# Patient Record
Sex: FEMALE | Race: WHITE | NOT HISPANIC OR LATINO | Employment: FULL TIME | URBAN - METROPOLITAN AREA
[De-identification: names, ages, dates, MRNs, and addresses within clinical notes are randomized per-mention and may not be internally consistent; named-entity substitution may affect disease eponyms.]

---

## 2017-02-20 ENCOUNTER — OFFICE VISIT (OUTPATIENT)
Dept: OBSTETRICS AND GYNECOLOGY | Age: 37
End: 2017-02-20

## 2017-02-20 VITALS
DIASTOLIC BLOOD PRESSURE: 88 MMHG | HEIGHT: 67 IN | WEIGHT: 274 LBS | BODY MASS INDEX: 43 KG/M2 | SYSTOLIC BLOOD PRESSURE: 110 MMHG

## 2017-02-20 DIAGNOSIS — N83.201 CYST OF RIGHT OVARY: ICD-10-CM

## 2017-02-20 DIAGNOSIS — Z01.419 ENCOUNTER FOR GYNECOLOGICAL EXAMINATION WITHOUT ABNORMAL FINDING: Primary | ICD-10-CM

## 2017-02-20 DIAGNOSIS — Z87.42 HISTORY OF PCOS: ICD-10-CM

## 2017-02-20 PROCEDURE — 99385 PREV VISIT NEW AGE 18-39: CPT | Performed by: OBSTETRICS & GYNECOLOGY

## 2017-02-20 RX ORDER — PHENTERMINE HYDROCHLORIDE 37.5 MG/1
37.5 CAPSULE ORAL
COMMUNITY
End: 2020-01-21

## 2017-02-20 NOTE — PROGRESS NOTES
"Routine Annual Visit    2/20/2017    Patient: Lizette Flores          MR#:[id number]      Chief Complaint   Patient presents with   • Annual Exam     PT HERE AS NEW GYN, NEEDS PAP. LAST ANNUAL 3 YRS AGO, NO COMPLAINTS.        History of Present Illness    36 y.o. female No obstetric history on file. who presents for annual exam.   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 Patient is not currently having periods (Reason: Other).  Obstetric History:  OB History     No data available         Menstrual History:     No LMP recorded. Patient is not currently having periods (Reason: Other).       Sexual History:       ________________________________________  There is no problem list on file for this patient.      No past medical history on file.    No past surgical history on file.    History   Smoking Status   • Not on file   Smokeless Tobacco   • Not on file       has a current medication list which includes the following prescription(s): phentermine.  ________________________________________    Current contraception: IUD  History of abnormal Pap smear: no  Family history of Breast cancer: MGM- 80s  Family history of uterine or ovarian cancer: no  Family History of colon cancer/colon polyps: no  History of abnormal mammogram: no      The following portions of the patient's history were reviewed and updated as appropriate: allergies, current medications, past family history, past medical history, past social history, past surgical history and problem list.    Review of Systems    Pertinent items are noted in HPI.     Objective   Physical Exam    Visit Vitals   • /88   • Ht 67\" (170.2 cm)   • " "Wt 274 lb (124 kg)   • LMP Comment: MIRENA   • BMI 42.91 kg/m2      BP Readings from Last 3 Encounters:   02/20/17 110/88      Wt Readings from Last 3 Encounters:   02/20/17 274 lb (124 kg)      BMI: Estimated body mass index is 42.91 kg/(m^2) as calculated from the following:    Height as of this encounter: 67\" (170.2 cm).    Weight as of this encounter: 274 lb (124 kg).      General:   alert, appears stated age and cooperative   Abdomen: soft, non-tender, without masses or organomegaly   Breast: inspection negative, no nipple discharge or bleeding, no masses or nodularity palpable   Vulva: normal   Vagina: normal mucosa   Cervix: no cervical motion tenderness and no lesions   Uterus: normal size, mobile or non-tender   Adnexa: normal adnexa and no mass, fullness, tenderness     Assessment:    1. Normal annual exam   Assessment     ICD-10-CM ICD-9-CM   1. Encounter for gynecological examination without abnormal finding Z01.419 V72.31   2. Cyst of right ovary N83.201 620.2   3. History of PCOS Z87.42 V13.29     Plan:    Plan     []  Mammogram request made  [x]  PAP done  []  Labs:   []  GC/Chl/TV  []  DEXA scan   []  Referral for colonoscopy:     mammo age 41  IUD removal next AE  sched GYN US to eval for cyst  "

## 2017-02-24 LAB
CYTOLOGIST CVX/VAG CYTO: ABNORMAL
CYTOLOGY CVX/VAG DOC THIN PREP: ABNORMAL
DX ICD CODE: ABNORMAL
DX ICD CODE: ABNORMAL
HIV 1 & 2 AB SER-IMP: ABNORMAL
HPV I/H RISK 4 DNA CVX QL PROBE+SIG AMP: POSITIVE
HPV16 DNA CVX QL PROBE+SIG AMP: NEGATIVE
HPV18+45 E6+E7 MRNA CVX QL NAA+PROBE: NEGATIVE
OTHER STN SPEC: ABNORMAL
PATH REPORT.FINAL DX SPEC: ABNORMAL
STAT OF ADQ CVX/VAG CYTO-IMP: ABNORMAL

## 2017-02-28 ENCOUNTER — TELEPHONE (OUTPATIENT)
Dept: OBSTETRICS AND GYNECOLOGY | Age: 37
End: 2017-02-28

## 2017-02-28 NOTE — TELEPHONE ENCOUNTER
----- Message from Renee Cardenas MD sent at 2/27/2017  2:19 PM EST -----  Notify negative pap , but HPV is positive, not the highest risk types.  I would rec retesting in 6 months, sched repeat pap in 6 months

## 2017-02-28 NOTE — TELEPHONE ENCOUNTER
LMTC    Notify negative pap , but HPV is positive, not the highest risk types. I would rec retesting in 6 months, sched repeat pap in 6 months

## 2017-03-09 ENCOUNTER — PROCEDURE VISIT (OUTPATIENT)
Dept: OBSTETRICS AND GYNECOLOGY | Age: 37
End: 2017-03-09

## 2017-03-09 ENCOUNTER — OFFICE VISIT (OUTPATIENT)
Dept: OBSTETRICS AND GYNECOLOGY | Age: 37
End: 2017-03-09

## 2017-03-09 VITALS
HEIGHT: 66 IN | SYSTOLIC BLOOD PRESSURE: 114 MMHG | BODY MASS INDEX: 43.71 KG/M2 | WEIGHT: 272 LBS | DIASTOLIC BLOOD PRESSURE: 82 MMHG

## 2017-03-09 DIAGNOSIS — N83.201 CYST OF RIGHT OVARY: Primary | ICD-10-CM

## 2017-03-09 DIAGNOSIS — D25.1 INTRAMURAL LEIOMYOMA OF UTERUS: ICD-10-CM

## 2017-03-09 DIAGNOSIS — N83.201 CYST OF OVARY, RIGHT: Primary | ICD-10-CM

## 2017-03-09 PROCEDURE — 76830 TRANSVAGINAL US NON-OB: CPT | Performed by: OBSTETRICS & GYNECOLOGY

## 2017-03-09 PROCEDURE — 99213 OFFICE O/P EST LOW 20 MIN: CPT | Performed by: OBSTETRICS & GYNECOLOGY

## 2017-03-09 NOTE — PROGRESS NOTES
"GYN Visit    3/9/2017    Patient: Lizette Flores          MR#:[id number]      Chief Complaint   Patient presents with   • Imaging Only     PT HERE TO EVALUATE CYST ON RIGHT OVARY.       History of Present Illness    36 y.o. female No obstetric history on file. who presents for follow up ovarian cyst  No complaints, has Mirena IUD  Reviewed pap with pos HPV- pt has repeat pap sched in 6 months, disc HPV and cervical dysplasia         No LMP recorded. Patient is not currently having periods (Reason: Other).    ________________________________________  There is no problem list on file for this patient.      No past medical history on file.    No past surgical history on file.    History   Smoking Status   • Not on file   Smokeless Tobacco   • Not on file       has a current medication list which includes the following prescription(s): phentermine.  ________________________________________    Current contraception: IUD      The following portions of the patient's history were reviewed and updated as appropriate: allergies, current medications, past family history, past medical history, past social history, past surgical history and problem list.    Review of Systems    Pertinent items are noted in HPI.     Objective   Physical Exam    Visit Vitals   • /82   • Ht 66\" (167.6 cm)   • Wt 272 lb (123 kg)   • LMP Comment: MIRENA   • BMI 43.9 kg/m2      BP Readings from Last 3 Encounters:   03/09/17 114/82   02/20/17 110/88      Wt Readings from Last 3 Encounters:   03/09/17 272 lb (123 kg)   02/20/17 274 lb (124 kg)      BMI: Estimated body mass index is 43.9 kg/(m^2) as calculated from the following:    Height as of this encounter: 66\" (167.6 cm).    Weight as of this encounter: 272 lb (123 kg).      General:   alert, appears stated age and cooperative   Abdomen: soft, non-tender, without masses or organomegaly   Breast: not examined   Vulva: normal   Vagina: normal mucosa   Cervix: no cervical motion tenderness and " no lesions   Uterus: normal size, mobile or non-tender   Adnexa: normal adnexa and no mass, fullness, tenderness     US done in office:  See report, 2 small fibroids,ovaries with normal sized follicles, ovarian cyst resolved, non tender to probe, IUD in place    Assessment:      Lizette was seen today for imaging only.    Diagnoses and all orders for this visit:    Cyst of ovary, right    Intramural leiomyoma of uterus        Discussed diminutive fibroids, disc HPV  Ovarian cyst has resolved, overall reassuring US  Follow up 6 months for repeat pap

## 2017-08-25 ENCOUNTER — OFFICE VISIT (OUTPATIENT)
Dept: OBSTETRICS AND GYNECOLOGY | Age: 37
End: 2017-08-25

## 2017-08-25 VITALS
SYSTOLIC BLOOD PRESSURE: 128 MMHG | BODY MASS INDEX: 47.8 KG/M2 | WEIGHT: 280 LBS | DIASTOLIC BLOOD PRESSURE: 78 MMHG | HEIGHT: 64 IN

## 2017-08-25 DIAGNOSIS — R87.619 ABNORMAL CERVICAL PAPANICOLAOU SMEAR, UNSPECIFIED ABNORMAL PAP FINDING: ICD-10-CM

## 2017-08-25 DIAGNOSIS — Z12.4 SCREENING FOR CERVICAL CANCER: ICD-10-CM

## 2017-08-25 DIAGNOSIS — Z11.51 SCREENING FOR HPV (HUMAN PAPILLOMAVIRUS): Primary | ICD-10-CM

## 2017-08-25 DIAGNOSIS — N92.6 IRREGULAR MENSES: ICD-10-CM

## 2017-08-25 PROCEDURE — 99213 OFFICE O/P EST LOW 20 MIN: CPT | Performed by: OBSTETRICS & GYNECOLOGY

## 2017-08-25 RX ORDER — PEN NEEDLE, DIABETIC 31 G X1/4"
NEEDLE, DISPOSABLE MISCELLANEOUS
COMMUNITY
Start: 2017-06-05 | End: 2022-04-22

## 2017-08-25 RX ORDER — SUMATRIPTAN 100 MG/1
TABLET, FILM COATED ORAL
COMMUNITY
Start: 2017-08-18 | End: 2019-01-07

## 2017-08-25 NOTE — PROGRESS NOTES
"GYN Visit    8/25/2017    Patient: Lizette Flores          MR#:[id number]      Chief Complaint   Patient presents with   • Follow-up     PT HERE FOR 6 MO REP PAP DUE TO NEG PAP BUT +HPV. SHE IS DOING WELL.       History of Present Illness    37 y.o. female No obstetric history on file. who presents for  Follow up pap  Normal pap but hpv positive but not the 16/18/45 (2/20/17)  IUD in place- no menses except has spontaneous menses on 8/3 this month- dina heavy  Thinks IUD will need to be changed 8/18  Considering fertility soon        Patient's last menstrual period was 08/03/2017 (exact date).    ________________________________________  There is no problem list on file for this patient.      Past Medical History:   Diagnosis Date   • HPV in female        No past surgical history on file.    History   Smoking Status   • Not on file   Smokeless Tobacco   • Not on file       has a current medication list which includes the following prescription(s): pen needles, phentermine, and sumatriptan.  ________________________________________    Current contraception: IUD      The following portions of the patient's history were reviewed and updated as appropriate: allergies, current medications, past family history, past medical history, past social history, past surgical history and problem list.    Review of Systems   Constitutional: Negative for fatigue.   Gastrointestinal: Negative for nausea and vomiting.   Genitourinary: Positive for menstrual problem. Negative for dysuria, pelvic pain and vaginal discharge.   Neurological: Negative for light-headedness.   Psychiatric/Behavioral: Negative for dysphoric mood.   All other systems reviewed and are negative.           Objective   Physical Exam    /78  Ht 64\" (162.6 cm)  Wt 280 lb (127 kg)  LMP 08/03/2017 (Exact Date)  BMI 48.06 kg/m2   BP Readings from Last 3 Encounters:   08/25/17 128/78   03/09/17 114/82   02/20/17 110/88      Wt Readings from Last 3 " "Encounters:   08/25/17 280 lb (127 kg)   03/09/17 272 lb (123 kg)   02/20/17 274 lb (124 kg)      BMI: Estimated body mass index is 48.06 kg/(m^2) as calculated from the following:    Height as of this encounter: 64\" (162.6 cm).    Weight as of this encounter: 280 lb (127 kg).      General:   alert, appears stated age and cooperative   Abdomen: soft, non-tender, without masses or organomegaly   Breast: Not examined   Vulva: normal   Vagina: normal mucosa, normal discharge   Cervix: no cervical motion tenderness, no lesions and string visible, small amount bleeding with  pap   Uterus: Deferred   Adnexa: Deferred     Assessment:      Lizette was seen today for follow-up.    Diagnoses and all orders for this visit:    Screening for HPV (human papillomavirus)  -     IGP, Apt HPV,rfx 16 / 18,45    Abnormal cervical Papanicolaou smear, unspecified abnormal pap finding  -     IGP, Apt HPV,rfx 16 / 18,45    Screening for cervical cancer    Irregular menses        Will observe menses for now with IUD in place  Will order chart to see when IUD expires  AE in 6 months- will need colpo if pap is abn      "

## 2017-08-29 ENCOUNTER — TELEPHONE (OUTPATIENT)
Dept: OBSTETRICS AND GYNECOLOGY | Age: 37
End: 2017-08-29

## 2017-08-29 NOTE — TELEPHONE ENCOUNTER
----- Message from Renee Cardenas MD sent at 8/29/2017  7:09 AM EDT -----  Is there a pap with this , it is strange to have hpv first.  This should be a pap with hpv and reflex to 16/18/45- ?ordered wrong

## 2017-08-29 NOTE — TELEPHONE ENCOUNTER
THIS IS A PRELIMINARY RESULT, WAS ORDERED CORRECTLY BUT FOR SOME REASON HPV CAME BACK FIRST. WILL CHECK TOMORROW FOR COMPLETE RESULT.

## 2017-08-30 LAB
HPV I/H RISK 4 DNA CVX QL PROBE+SIG AMP: NEGATIVE
PATH REPORT.FINAL DX SPEC: NORMAL

## 2018-10-12 ENCOUNTER — OFFICE VISIT (OUTPATIENT)
Dept: OBSTETRICS AND GYNECOLOGY | Age: 38
End: 2018-10-12

## 2018-10-12 VITALS
BODY MASS INDEX: 45.83 KG/M2 | SYSTOLIC BLOOD PRESSURE: 124 MMHG | HEIGHT: 67 IN | WEIGHT: 292 LBS | DIASTOLIC BLOOD PRESSURE: 84 MMHG

## 2018-10-12 DIAGNOSIS — Z01.419 WELL WOMAN EXAM WITH ROUTINE GYNECOLOGICAL EXAM: Primary | ICD-10-CM

## 2018-10-12 DIAGNOSIS — B97.7 HPV IN FEMALE: ICD-10-CM

## 2018-10-12 DIAGNOSIS — Z30.431 IUD CHECK UP: ICD-10-CM

## 2018-10-12 PROCEDURE — 99395 PREV VISIT EST AGE 18-39: CPT | Performed by: PHYSICIAN ASSISTANT

## 2018-10-12 RX ORDER — RIBOFLAVIN (VITAMIN B2) 100 MG
100 TABLET ORAL DAILY
COMMUNITY
End: 2020-05-27

## 2018-10-12 RX ORDER — CHLORAL HYDRATE 500 MG
1000 CAPSULE ORAL
COMMUNITY

## 2018-10-12 RX ORDER — GABAPENTIN 100 MG/1
100 CAPSULE ORAL 3 TIMES DAILY
COMMUNITY
Start: 2018-05-15

## 2018-10-12 RX ORDER — PROPRANOLOL HYDROCHLORIDE 20 MG/1
5 TABLET ORAL 2 TIMES DAILY
COMMUNITY
Start: 2018-05-15 | End: 2020-05-18

## 2018-10-12 RX ORDER — MISOPROSTOL 200 UG/1
TABLET ORAL
Qty: 1 TABLET | Refills: 0 | Status: SHIPPED | OUTPATIENT
Start: 2018-10-12 | End: 2018-11-13

## 2018-10-12 NOTE — PROGRESS NOTES
"Subjective     Chief Complaint   Patient presents with   • Gynecologic Exam     annual exam. and repeat pap last pap 08/25/2017 neg/neg   • Procedure     wants iud removed and benefits checked        History of Present Illness    Lizette Flores is a 38 y.o. No obstetric history on file. who presents for annual exam.    Pt here for her annual exam  She has a h/o abnormal pap/HPV  She also has an IUD that needs to be removed but also wants to have one replaced  She is here with her   She has no new med issues  Sees her PCP routinely and will check routine labs as well    She is a pt of Dr hough    Her menses are rare, lasting 0-3 days, dysmenorrhea none   Obstetric History:  OB History     No data available         Menstrual History:     No LMP recorded. Patient has had an implant.         Current contraception: IUD  History of abnormal Pap smear: yes - hpv  Received Gardasil immunization: no  Perform regular self breast exam: yes - mthly  Family history of uterine or ovarian cancer: no  Family History of colon cancer: no  Family history of breast cancer: no    Mammogram: not indicated.  Colonoscopy: not indicated.  DEXA: not indicated.    Exercise: not active  Calcium/Vitamin D: adequate intake    The following portions of the patient's history were reviewed and updated as appropriate: allergies, current medications, past family history, past medical history, past social history, past surgical history and problem list.    Review of Systems   All other systems reviewed and are negative.      Review of Systems   Constitutional: Negative for fatigue.   Respiratory: Negative for shortness of breath.    Gastrointestinal: Negative for abdominal pain.   Genitourinary: Negative for dysuria.   Neurological: Negative for headaches.   Psychiatric/Behavioral: Negative for dysphoric mood.         Objective   Physical Exam    /84   Ht 170.2 cm (67\")   Wt 132 kg (292 lb)   Breastfeeding? No   BMI 45.73 kg/m² "     General:   alert, appears stated age and cooperative   Neck: no adenopathy and thyroid normal to palpation   Heart: regular rate and rhythm   Lungs: clear to auscultation bilaterally   Abdomen: soft and nontender   Breast: inspection negative, no nipple discharge or bleeding, no masses or nodularity palpable   Vulva: normal   Vagina: normal mucosa, normal discharge   Cervix: no lesions and iud in place   Uterus: normal size, non-tender   Adnexa: normal adnexa and no mass, fullness, tenderness   Rectal: not indicated     Assessment/Plan   Lizette was seen today for gynecologic exam and procedure.    Diagnoses and all orders for this visit:    Well woman exam with routine gynecological exam  -     Pap IG, Rfx HPV ASCU    IUD check up    HPV in female  -     Pap IG, Rfx HPV ASCU    Other orders  -     misoprostol (CYTOTEC) 200 MCG tablet; 1 po night before procedure        All questions answered.  Breast self exam technique reviewed and patient encouraged to perform self-exam monthly.  Discussed healthy lifestyle modifications.  Recommended 30 minutes of aerobic exercise five times per week.  Discussed calcium needs to prevent osteoporosis.    Disc pap guidelines, she is due given her h/o abnormal/+hpv result  Disc removal and replacement at the same time. She is overdue for removal, but not having a cycle and likely having some contraceptive benefit.  Will plan to use back up though as well  Disc Kyleena and gave HO  Plan cytotec as pt has never had a baby

## 2018-10-15 LAB
CONV .: NORMAL
CYTOLOGIST CVX/VAG CYTO: NORMAL
CYTOLOGY CVX/VAG DOC THIN PREP: NORMAL
DX ICD CODE: NORMAL
HIV 1 & 2 AB SER-IMP: NORMAL
OTHER STN SPEC: NORMAL
PATH REPORT.FINAL DX SPEC: NORMAL
STAT OF ADQ CVX/VAG CYTO-IMP: NORMAL

## 2018-11-13 ENCOUNTER — OFFICE VISIT (OUTPATIENT)
Dept: OBSTETRICS AND GYNECOLOGY | Age: 38
End: 2018-11-13

## 2018-11-13 ENCOUNTER — PROCEDURE VISIT (OUTPATIENT)
Dept: OBSTETRICS AND GYNECOLOGY | Age: 38
End: 2018-11-13

## 2018-11-13 VITALS
SYSTOLIC BLOOD PRESSURE: 128 MMHG | WEIGHT: 292 LBS | HEIGHT: 67 IN | DIASTOLIC BLOOD PRESSURE: 80 MMHG | BODY MASS INDEX: 45.83 KG/M2

## 2018-11-13 DIAGNOSIS — Z30.430 ENCOUNTER FOR INSERTION OF MIRENA IUD: Primary | ICD-10-CM

## 2018-11-13 DIAGNOSIS — Z30.432 ENCOUNTER FOR REMOVAL OF INTRAUTERINE CONTRACEPTIVE DEVICE (IUD): ICD-10-CM

## 2018-11-13 DIAGNOSIS — Z30.431 IUD CHECK UP: Primary | ICD-10-CM

## 2018-11-13 LAB
B-HCG UR QL: NEGATIVE
INTERNAL NEGATIVE CONTROL: NEGATIVE
INTERNAL POSITIVE CONTROL: POSITIVE
Lab: NORMAL

## 2018-11-13 PROCEDURE — 81025 URINE PREGNANCY TEST: CPT | Performed by: PHYSICIAN ASSISTANT

## 2018-11-13 PROCEDURE — 58301 REMOVE INTRAUTERINE DEVICE: CPT | Performed by: PHYSICIAN ASSISTANT

## 2018-11-13 PROCEDURE — 58300 INSERT INTRAUTERINE DEVICE: CPT | Performed by: PHYSICIAN ASSISTANT

## 2018-11-13 PROCEDURE — 76830 TRANSVAGINAL US NON-OB: CPT | Performed by: PHYSICIAN ASSISTANT

## 2018-11-13 NOTE — PROGRESS NOTES
IUD Removal    Date of procedure:  11/13/2018    Risks and benefits discussed? yes  All questions answered? yes  Consents given by The patient  Reason for removal: Device expiration        Procedure documentation:    A speculum was placed in order to view the cervix.  .  The IUD string was easily seen.  The string was grasped and the IUD was removed without difficulty.  The IUD did not appear to be adherent to the uterine cavity. It was removed intact.    She tolerated the procedure without any difficulty.     Post procedure instructions: Patient notified to call with heavy bleeding, fever or increasing pain.    Follow up needed: 6 week(s) for iud check        IUD Insertion    No LMP recorded. Patient has had an implant.    Date of procedure:  11/13/2018    Risks and benefits discussed? yes  All questions answered? yes  Consents given by The patient  Written consent obtained? yes    Procedure documentation:     Urine pregnancy test was done today and result was negative.  The risks (including infection,  bleeding, pain, and uterine perforation) and benefits of the procedure were  explained to the patient and Written informed consent was  obtained.    After verifying the patient had a low probability of being pregnant and met the criteria for insertion, a sterile speculum has placed and the cervix was cleansed with an antiseptic solution.  Vaginal discharge was scant.  The anterior lip of the cervix was grasped with a tenaculum and the uterine cavity was gently sounded. There was moderate difficulty passing the sound through the cervix.  Cervical dilation did need to be performed prior to placing the IUD.  The uterus was anteverted and sounded to 8 cms.  The Mirena was then prepared per the manufacturers instructions.    The Mirena was advanced to a point 2 cms from the fundus and then the arms were released from the sheath.  The device was advanced to the fundus and the device was released fully from the sheath..  The string was cut 2 cms in length.  Bleeding from the cervix was scant.    She tolerated the procedure without any difficulty.    Post procedure instructions: The patient was advised to call for any fever or for prolonged or severe pain or bleeding..    Follow up needed: 6 weeks for IUD check    U/s done to confirm placement and IUD within endometrial cavity. Fibroid noted that measures 1.67 x 1.92 cm

## 2019-01-07 PROBLEM — F32.A ANXIETY AND DEPRESSION: Status: ACTIVE | Noted: 2019-01-07

## 2019-01-07 PROBLEM — R53.82 CHRONIC FATIGUE: Status: ACTIVE | Noted: 2019-01-07

## 2019-01-07 PROBLEM — F41.9 ANXIETY AND DEPRESSION: Status: ACTIVE | Noted: 2019-01-07

## 2019-01-07 PROBLEM — M25.559 HIP PAIN: Status: ACTIVE | Noted: 2019-01-07

## 2019-01-07 PROBLEM — R12 HEARTBURN: Status: ACTIVE | Noted: 2019-01-07

## 2019-01-07 PROBLEM — N20.0 KIDNEY STONES: Status: ACTIVE | Noted: 2019-01-07

## 2019-01-09 DIAGNOSIS — R53.82 CHRONIC FATIGUE: ICD-10-CM

## 2019-01-09 DIAGNOSIS — R12 HEARTBURN: ICD-10-CM

## 2019-01-09 DIAGNOSIS — F41.9 ANXIETY AND DEPRESSION: ICD-10-CM

## 2019-01-09 DIAGNOSIS — M25.559 ARTHRALGIA OF HIP, UNSPECIFIED LATERALITY: ICD-10-CM

## 2019-01-09 DIAGNOSIS — E66.01 OBESITY, CLASS III, BMI 40-49.9 (MORBID OBESITY) (HCC): Primary | ICD-10-CM

## 2019-01-09 DIAGNOSIS — F32.A ANXIETY AND DEPRESSION: ICD-10-CM

## 2019-01-09 PROBLEM — E66.813 OBESITY, CLASS III, BMI 40-49.9 (MORBID OBESITY): Status: ACTIVE | Noted: 2019-01-09

## 2019-01-09 PROBLEM — B97.7 HPV IN FEMALE: Status: RESOLVED | Noted: 2018-10-12 | Resolved: 2019-01-09

## 2019-01-10 DIAGNOSIS — E66.01 OBESITY, CLASS III, BMI 40-49.9 (MORBID OBESITY) (HCC): Primary | ICD-10-CM

## 2019-01-11 ENCOUNTER — APPOINTMENT (OUTPATIENT)
Dept: LAB | Facility: HOSPITAL | Age: 39
End: 2019-01-11

## 2020-01-09 PROBLEM — I10 ESSENTIAL HYPERTENSION: Status: ACTIVE | Noted: 2020-01-09

## 2020-01-21 ENCOUNTER — HOSPITAL ENCOUNTER (OUTPATIENT)
Dept: GENERAL RADIOLOGY | Facility: HOSPITAL | Age: 40
Discharge: HOME | End: 2020-01-21

## 2020-01-21 ENCOUNTER — LAB (OUTPATIENT)
Dept: LAB | Facility: HOSPITAL | Age: 40
End: 2020-01-21

## 2020-01-21 ENCOUNTER — HOSPITAL ENCOUNTER (OUTPATIENT)
Dept: CARDIOLOGY | Facility: HOSPITAL | Age: 40
Discharge: HOME | End: 2020-01-21
Admitting: NURSE PRACTITIONER

## 2020-01-21 ENCOUNTER — CONSULT (OUTPATIENT)
Dept: BARIATRICS/WEIGHT MGMT | Facility: CLINIC | Age: 40
End: 2020-01-21

## 2020-01-21 VITALS
DIASTOLIC BLOOD PRESSURE: 97 MMHG | HEIGHT: 66 IN | BODY MASS INDEX: 47.09 KG/M2 | WEIGHT: 293 LBS | RESPIRATION RATE: 18 BRPM | SYSTOLIC BLOOD PRESSURE: 149 MMHG | HEART RATE: 71 BPM | TEMPERATURE: 97.2 F

## 2020-01-21 DIAGNOSIS — F41.9 ANXIETY AND DEPRESSION: ICD-10-CM

## 2020-01-21 DIAGNOSIS — Z01.818 PRE-OP EVALUATION: ICD-10-CM

## 2020-01-21 DIAGNOSIS — E66.01 MORBID OBESITY WITH BMI OF 50.0-59.9, ADULT (HCC): ICD-10-CM

## 2020-01-21 DIAGNOSIS — E78.1 HYPERTRIGLYCERIDEMIA: ICD-10-CM

## 2020-01-21 DIAGNOSIS — R53.82 CHRONIC FATIGUE: ICD-10-CM

## 2020-01-21 DIAGNOSIS — F32.A ANXIETY AND DEPRESSION: ICD-10-CM

## 2020-01-21 DIAGNOSIS — K21.9 GASTROESOPHAGEAL REFLUX DISEASE, ESOPHAGITIS PRESENCE NOT SPECIFIED: ICD-10-CM

## 2020-01-21 DIAGNOSIS — R06.83 SNORING: ICD-10-CM

## 2020-01-21 DIAGNOSIS — E66.01 OBESITY, CLASS III, BMI 40-49.9 (MORBID OBESITY) (HCC): ICD-10-CM

## 2020-01-21 DIAGNOSIS — E66.01 MORBID OBESITY WITH BMI OF 50.0-59.9, ADULT (HCC): Primary | ICD-10-CM

## 2020-01-21 DIAGNOSIS — M25.559 ARTHRALGIA OF HIP, UNSPECIFIED LATERALITY: ICD-10-CM

## 2020-01-21 DIAGNOSIS — I10 ESSENTIAL HYPERTENSION: ICD-10-CM

## 2020-01-21 DIAGNOSIS — R12 HEARTBURN: ICD-10-CM

## 2020-01-21 PROBLEM — E28.2 PCOS (POLYCYSTIC OVARIAN SYNDROME): Status: ACTIVE | Noted: 2017-03-29

## 2020-01-21 PROBLEM — N20.0 KIDNEY STONES: Status: RESOLVED | Noted: 2019-01-07 | Resolved: 2020-01-21

## 2020-01-21 PROBLEM — G43.909 MIGRAINE: Status: ACTIVE | Noted: 2020-01-21

## 2020-01-21 PROBLEM — E66.813 OBESITY, CLASS III, BMI 40-49.9 (MORBID OBESITY): Status: RESOLVED | Noted: 2019-01-09 | Resolved: 2020-01-21

## 2020-01-21 PROBLEM — Z71.3 DIETARY COUNSELING: Status: ACTIVE | Noted: 2020-01-21

## 2020-01-21 PROBLEM — E55.9 VITAMIN D DEFICIENCY: Status: ACTIVE | Noted: 2017-03-29

## 2020-01-21 LAB
ALBUMIN SERPL-MCNC: 4.1 G/DL (ref 3.5–5.2)
ALBUMIN/GLOB SERPL: 1.3 G/DL
ALP SERPL-CCNC: 74 U/L (ref 39–117)
ALT SERPL W P-5'-P-CCNC: 17 U/L (ref 1–33)
ANION GAP SERPL CALCULATED.3IONS-SCNC: 12.8 MMOL/L (ref 5–15)
AST SERPL-CCNC: 15 U/L (ref 1–32)
BASOPHILS # BLD AUTO: 0.06 10*3/MM3 (ref 0–0.2)
BASOPHILS NFR BLD AUTO: 0.6 % (ref 0–1.5)
BILIRUB SERPL-MCNC: 0.3 MG/DL (ref 0.2–1.2)
BUN BLD-MCNC: 16 MG/DL (ref 6–20)
BUN/CREAT SERPL: 17.4 (ref 7–25)
CALCIUM SPEC-SCNC: 9.3 MG/DL (ref 8.6–10.5)
CHLORIDE SERPL-SCNC: 100 MMOL/L (ref 98–107)
CHOLEST SERPL-MCNC: 222 MG/DL (ref 0–200)
CO2 SERPL-SCNC: 26.2 MMOL/L (ref 22–29)
CREAT BLD-MCNC: 0.92 MG/DL (ref 0.57–1)
DEPRECATED RDW RBC AUTO: 41 FL (ref 37–54)
EOSINOPHIL # BLD AUTO: 0.26 10*3/MM3 (ref 0–0.4)
EOSINOPHIL NFR BLD AUTO: 2.6 % (ref 0.3–6.2)
ERYTHROCYTE [DISTWIDTH] IN BLOOD BY AUTOMATED COUNT: 12.3 % (ref 12.3–15.4)
GFR SERPL CREATININE-BSD FRML MDRD: 68 ML/MIN/1.73
GLOBULIN UR ELPH-MCNC: 3.1 GM/DL
GLUCOSE BLD-MCNC: 99 MG/DL (ref 65–99)
HBA1C MFR BLD: 5.3 % (ref 4.8–5.6)
HCT VFR BLD AUTO: 41.3 % (ref 34–46.6)
HDLC SERPL-MCNC: 50 MG/DL (ref 40–60)
HGB BLD-MCNC: 14 G/DL (ref 12–15.9)
IMM GRANULOCYTES # BLD AUTO: 0.11 10*3/MM3 (ref 0–0.05)
IMM GRANULOCYTES NFR BLD AUTO: 1.1 % (ref 0–0.5)
LDLC SERPL CALC-MCNC: 124 MG/DL (ref 0–100)
LDLC/HDLC SERPL: 2.49 {RATIO}
LYMPHOCYTES # BLD AUTO: 2.41 10*3/MM3 (ref 0.7–3.1)
LYMPHOCYTES NFR BLD AUTO: 23.9 % (ref 19.6–45.3)
MCH RBC QN AUTO: 31.1 PG (ref 26.6–33)
MCHC RBC AUTO-ENTMCNC: 33.9 G/DL (ref 31.5–35.7)
MCV RBC AUTO: 91.8 FL (ref 79–97)
MONOCYTES # BLD AUTO: 0.62 10*3/MM3 (ref 0.1–0.9)
MONOCYTES NFR BLD AUTO: 6.2 % (ref 5–12)
NEUTROPHILS # BLD AUTO: 6.61 10*3/MM3 (ref 1.7–7)
NEUTROPHILS NFR BLD AUTO: 65.6 % (ref 42.7–76)
NRBC BLD AUTO-RTO: 0.1 /100 WBC (ref 0–0.2)
PLATELET # BLD AUTO: 276 10*3/MM3 (ref 140–450)
PMV BLD AUTO: 10.1 FL (ref 6–12)
POTASSIUM BLD-SCNC: 4.1 MMOL/L (ref 3.5–5.2)
PROT SERPL-MCNC: 7.2 G/DL (ref 6–8.5)
RBC # BLD AUTO: 4.5 10*6/MM3 (ref 3.77–5.28)
SODIUM BLD-SCNC: 139 MMOL/L (ref 136–145)
TRIGL SERPL-MCNC: 238 MG/DL (ref 0–150)
TSH SERPL DL<=0.05 MIU/L-ACNC: 3.35 UIU/ML (ref 0.27–4.2)
VLDLC SERPL-MCNC: 47.6 MG/DL (ref 5–40)
WBC NRBC COR # BLD: 10.07 10*3/MM3 (ref 3.4–10.8)

## 2020-01-21 PROCEDURE — 93010 ELECTROCARDIOGRAM REPORT: CPT | Performed by: INTERNAL MEDICINE

## 2020-01-21 PROCEDURE — 85025 COMPLETE CBC W/AUTO DIFF WBC: CPT

## 2020-01-21 PROCEDURE — 80053 COMPREHEN METABOLIC PANEL: CPT

## 2020-01-21 PROCEDURE — 36415 COLL VENOUS BLD VENIPUNCTURE: CPT

## 2020-01-21 PROCEDURE — 71046 X-RAY EXAM CHEST 2 VIEWS: CPT

## 2020-01-21 PROCEDURE — 99205 OFFICE O/P NEW HI 60 MIN: CPT | Performed by: NURSE PRACTITIONER

## 2020-01-21 PROCEDURE — 93005 ELECTROCARDIOGRAM TRACING: CPT | Performed by: NURSE PRACTITIONER

## 2020-01-21 PROCEDURE — 80061 LIPID PANEL: CPT

## 2020-01-21 PROCEDURE — 83036 HEMOGLOBIN GLYCOSYLATED A1C: CPT

## 2020-01-21 PROCEDURE — 84443 ASSAY THYROID STIM HORMONE: CPT

## 2020-01-21 RX ORDER — HYDROXYZINE HYDROCHLORIDE 25 MG/1
25 TABLET, FILM COATED ORAL AS NEEDED
COMMUNITY
Start: 2019-12-18

## 2020-01-21 RX ORDER — LANSOPRAZOLE 30 MG/1
30 CAPSULE, DELAYED RELEASE ORAL DAILY
COMMUNITY
End: 2020-08-07 | Stop reason: ALTCHOICE

## 2020-01-21 RX ORDER — VITAMIN B COMPLEX
1 CAPSULE ORAL DAILY
COMMUNITY
End: 2020-05-27

## 2020-01-21 NOTE — PROGRESS NOTES
[facility] BARIATRIC SURGERY  [address] [address]  [phone]  [address] [id number] [phone]  Dept: [phone]  1/21/2020      Lizette CEDEÑO.  [id number]  [id number]  1980  female      Chief Complaint of weight gain; unable to maintain weight loss    History of Present Illness:   Lizette is a 39 y.o. female who presents today for evaluation, education and consultation regarding weight loss surgery. The patient is interested in the sleeve gastrectomy.      Diet History:Lizette has been overweight for at least 20 years, has been 35 pounds or more overweight for at least 20 years, has been 100 pounds or more overweight for 5 or more years and started dieting at age 17.  The most weight Lizette lost was 80 pounds on medication and maintained the weight loss for 1 year. Lizette describes her eating habits as volume, snacker and sweets eater. Lizette CEDEÑO has tried Physician monitored, reduced calorie, exercising and medications among others with success of losing up to 80 pounds, but in each instance regained the weight.      See dietician documentation for complete history.    Bariatric Surgery Evaluation: The patient is being seen for an initial visit for bariatric surgery evaluation.     Bariatric Co-morbidities:  sleep disturbances with possible sleep apnea, hypertension, dyslipidemia, GERD, polycystic ovarian disease and depression    Patient Active Problem List   Diagnosis   • Chronic fatigue   • GERD (gastroesophageal reflux disease)   • Anxiety and depression   • Essential hypertension   • Vitamin D deficiency   • PCOS (polycystic ovarian syndrome)   • Hypertriglyceridemia   • Morbid obesity with BMI of 50.0-59.9, adult (CMS/[facility])   • Migraine   • Dietary counseling   • Pre-op evaluation   • Snoring       Past Medical History:   Diagnosis Date   • Hip pain 1/7/2019   • HPV in female    • Kidney stone     • Kidney stones 1/7/2019   • Migraine    • PCOS (polycystic ovarian syndrome)        Past Surgical History:   Procedure Laterality Date   • ENDOSCOPY  2010   • LAPAROSCOPIC CHOLECYSTECTOMY  2010   • MANDIBLE SURGERY     • ORIF ANKLE FRACTURE         No Known Allergies      Current Outpatient Medications:   •  ascorbic acid (VITAMIN C) 100 MG tablet, Take 100 mg by mouth., Disp: , Rfl:   •  B Complex Vitamins (VITAMIN B COMPLEX) capsule capsule, Take  by mouth Daily., Disp: , Rfl:   •  cholecalciferol (VITAMIN D3) 1000 units tablet, Take 1,000 Units by mouth Daily., Disp: , Rfl:   •  Erenumab-aooe 70 MG/ML solution auto-injector, Inject 140 mg under the skin into the appropriate area as directed., Disp: , Rfl:   •  FLUoxetine HCl (PROZAC PO), Take 60 mg by mouth Daily., Disp: , Rfl:   •  gabapentin (NEURONTIN) 100 MG capsule, Take 100-300 mg by mouth., Disp: , Rfl:   •  hydrOXYzine (ATARAX) 25 MG tablet, Take 25 mg by mouth., Disp: , Rfl:   •  Insulin Pen Needle (PEN NEEDLES) 31G X 6 MM misc, , Disp: , Rfl:   •  lansoprazole (PREVACID) 30 MG capsule, Take 30 mg by mouth Daily., Disp: , Rfl:   •  levonorgestrel (MIRENA, 52 MG,) 20 MCG/24HR IUD, 1 each by Intrauterine route 1 (One) Time., Disp: , Rfl:   •  lisinopril (PRINIVIL,ZESTRIL) 10 MG tablet, Take 10 mg by mouth Daily., Disp: , Rfl:   •  Magnesium 100 MG capsule, Take  by mouth., Disp: , Rfl:   •  Multiple Vitamin (MULTI VITAMIN DAILY PO), Take  by mouth., Disp: , Rfl:   •  Omega-3 Fatty Acids (FISH OIL) 1000 MG capsule capsule, Take  by mouth., Disp: , Rfl:   •  promethazine (PHENERGAN) 12.5 MG suppository, Insert 12.5 mg into the rectum Every 6 (Six) Hours As Needed for Nausea or Vomiting., Disp: , Rfl:   •  propranolol (INDERAL) 20 MG tablet, Take 5 mg by mouth., Disp: , Rfl:   •  SUMAtriptan (IMITREX) 100 MG tablet, Take 100 mg by mouth Every 2 (Two) Hours As Needed for Migraine. Take one tablet at onset of headache. May repeat dose one time in 2 hours if  headache not relieved., Disp: , Rfl:     Social History     Socioeconomic History   • Marital status:      Spouse name: Not on file   • Number of children: 3   • Years of education: Not on file   • Highest education level: Not on file   Tobacco Use   • Smoking status: Never Smoker   • Smokeless tobacco: Never Used   Substance and Sexual Activity   • Alcohol use: Not Currently     Frequency: Never     Comment: OCCASSIONALLY   • Drug use: No   • Sexual activity: Defer     Birth control/protection: IUD       Family History   Problem Relation Age of Onset   • Breast cancer Maternal Grandmother    • Hypertension Maternal Grandmother    • Cancer Maternal Grandmother         BREAST & BONE   • Obesity Mother    • Diabetes Mother    • Hypertension Mother    • Heart attack Mother    • Heart disease Mother    • Sleep apnea Mother    • Hypertension Father    • Heart disease Brother    • Hypertension Maternal Grandfather    • Heart disease Maternal Grandfather    • Cancer Maternal Grandfather    • Hypertension Paternal Grandmother    • Multiple sclerosis Paternal Grandmother    • Hypertension Paternal Grandfather    • Cancer Paternal Grandfather         LUNG         Review of Systems:  Review of Systems   All other systems reviewed and are negative.      Physical Exam:  Vital Signs:  Weight: (!) 144 kg (317 lb)   Body mass index is 50.64 kg/m².  Temp: 97.2 °F (36.2 °C)   Heart Rate: 71   BP: 149/97     Physical Exam   Constitutional: She is oriented to person, place, and time. She appears well-developed and well-nourished.   HENT:   Head: Normocephalic and atraumatic.   Eyes: EOM are normal.   Cardiovascular: Normal rate, regular rhythm and normal heart sounds.   Pulmonary/Chest: Effort normal and breath sounds normal.   Abdominal: Soft. Bowel sounds are normal. She exhibits no distension. There is no tenderness.   Musculoskeletal: Normal range of motion.   Neurological: She is alert and oriented to person, place, and  time.   Skin: Skin is warm and dry.   Psychiatric: She has a normal mood and affect. Her behavior is normal. Judgment and thought content normal.   Vitals reviewed.         Assessment:         Lizette CEDEÑO is a 39 y.o. year old female with medically complicated severe obesity. Weight: (!) 144 kg (317 lb), Body mass index is 50.64 kg/m². and weight related problems including sleep disturbances with possible sleep apnea, hypertension, dyslipidemia, GERD, polycystic ovarian disease and depression.    I explained in detail the procedures that we are performing.  All of those procedures can be performed laparoscopically but there is a chance to convert to open if any technical challenges or complications do occur.  Bariatric surgery is not cosmetic surgery but rather a tool to help a patient make a life-long commitment lifestyle changes including diet, exercise, behavior changes, and taking supplemental vitamins and minerals.    Due to the patient's BMI and co-morbidities they are at a high risk for surgery and will obtain the following:  The patient has been advised that a letter of medical support and a history and physical must be obtained from her primary care physician. A psychological evaluation will be arranged for this patient. CBC, CMP, FLP, TSH and HgbA1C will be drawn- reviewed in the office with the patient. Lizette CEDEÑO will obtain a pre-operative CXR and EKG.     Lizette CEDEÑO was screened for sleep apnea in our office today and based on their results is at risk; home sleep study ordered    Lizette CEDEÑO will be set up for a pre-operative diagnostic esophagogastroduodenoscopy with biopsy for evaluation. The risks and benefits of the procedure were discussed with the patient in detail and all questions were answered.  Possibility of perforation, bleeding, aspiration, anoxic brain injury, respiratory and/or cardiac arrest and death were discussed.   She received handouts regarding, all questions  were answered.     The risks, benefits, alternatives, and potential complications of all of the procedures were explained in detail including, but not limited to death, anesthesia and medication adverse effect/DVT, pulmonary embolism, trocar site/incisional hernia, wound infection, abdominal infection, bleeding, failure to lose weight or gain weight and change in body image, metabolic complications with calcium, thiamine, vitamin B12, folate, iron, and anemia.    The patient was advised to start a high protein, low fat and low carbohydrate diet. The patient was given individualized information by our dietician along with general group information and handouts.     The patient was given information regarding the MARIANNA educational video. MARIANNA is an internet based educational video which explains the surgical procedure and answers basic questions regarding the procedure. The patient was provided with instructions and a password to watch the video.    The patient was encouraged to start routine exercise including but not limited to 150 minutes per week. The patient received a resistance band along with a handout of exercises.     The consultation plan was reviewed with the patient.    The patient understands the surgical procedures and the different surgical options that are available.  She understands the lifestyle changes that would be required after surgery and has agreed to participate in a pre-operative and postoperative weight management program.  She also expressed understanding of possible risks, had several questions answered and desires to proceed.    I think she is a good candidate for this surgery, and is interested in a sleeve gastrectomy.    Encounter Diagnoses   Name Primary?   • Morbid obesity with BMI of 50.0-59.9, adult (CMS/[facility]) Yes   • Essential hypertension    • Hypertriglyceridemia    • Gastroesophageal reflux disease, esophagitis presence not specified    • Pre-op evaluation    • Snoring         Plan:    Patient will have evaluations and follow up with bariatric dieticians and a psychologist before undergoing a multidisciplinary review of her candidacy.  We also discussed the weight loss requirement and rationale, and other program requirements.      Alpa Cardenas, APRN  1/21/2020

## 2020-01-21 NOTE — PROGRESS NOTES
"Bariatric Nutrition Counseling Interview    Patient Name:  Lizette CEDEÑO  YOB: 1980  Age:  39 y.o.  Sex:  female  MRN: [id number]  Date:  01/21/20    Procedure Considering:  Sleeve    Last Documented Height:    Ht Readings from Last 1 Encounters:   01/21/20 168.5 cm (66.34\")     Last Documented Weight:   Wt Readings from Last 1 Encounters:   01/21/20 (!) 144 kg (317 lb)      Body mass index is 50.64 kg/m².    Highest Weight:  323  Goal Weight: 180    History:  Past Medical History:   Diagnosis Date   • Hip pain 1/7/2019   • HPV in female    • Kidney stone    • Kidney stones 1/7/2019   • Migraine    • PCOS (polycystic ovarian syndrome)      Past Surgical History:   Procedure Laterality Date   • ENDOSCOPY  2010   • LAPAROSCOPIC CHOLECYSTECTOMY  2010   • MANDIBLE SURGERY     • ORIF ANKLE FRACTURE       Family History   Problem Relation Age of Onset   • Breast cancer Maternal Grandmother    • Hypertension Maternal Grandmother    • Cancer Maternal Grandmother         BREAST & BONE   • Obesity Mother    • Diabetes Mother    • Hypertension Mother    • Heart attack Mother    • Heart disease Mother    • Sleep apnea Mother    • Hypertension Father    • Heart disease Brother    • Hypertension Maternal Grandfather    • Heart disease Maternal Grandfather    • Cancer Maternal Grandfather    • Hypertension Paternal Grandmother    • Multiple sclerosis Paternal Grandmother    • Hypertension Paternal Grandfather    • Cancer Paternal Grandfather         LUNG     Social History     Socioeconomic History   • Marital status:      Spouse name: Not on file   • Number of children: 3   • Years of education: Not on file   • Highest education level: Not on file   Tobacco Use   • Smoking status: Never Smoker   • Smokeless tobacco: Never Used   Substance and Sexual Activity   • Alcohol use: Not Currently     Frequency: Never     Comment: OCCASSIONALLY   • Drug use: No   • Sexual activity: Defer     Birth " control/protection: IUD     Additional Health Issues to Consider:  Depression, migraines, HTN per patient report    Weight History:  Always been overweight    Previous Weight Loss Efforts:  Calorie counting, lost 80lbs, regained  Most Successful Weight Loss Effort:  Calorie counting, Phentermine, low carb    Eating Habits: Eat large portions, Eat too fast, Particular food craving  Eat three meals on most days?  Yes  Worst eating habit?  Eat large portions, Eat too fast, Particular food craving    How often do you eat fast food? weekly    Do you exercise regularly? 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 The significance of taking in at least 70g of protein and 64 ounces of fluid was emphasized. The importance of routine exercise was discussed. Nutrition materials provided included a reduced calorie meal plan, protein sources, snack options, and diet guidelines post-surgery. Discussed personal habits and lifestyle behaviors that may influence diet efforts. She demonstrated a good comprehension of diet requirements and a commitment to work on personal challenges.  Patient was also provided a list of short-term goals to work towards prior to surgery. She appears to be an appropriate candidate for bariatric surgery.    Electronically signed by:  Sarahy Cazares RD  01/21/20 2:20 PM

## 2020-01-23 ENCOUNTER — TELEPHONE (OUTPATIENT)
Dept: BARIATRICS/WEIGHT MGMT | Facility: CLINIC | Age: 40
End: 2020-01-23

## 2020-01-23 PROBLEM — K21.9 GASTROESOPHAGEAL REFLUX DISEASE: Status: ACTIVE | Noted: 2020-01-23

## 2020-01-23 NOTE — TELEPHONE ENCOUNTER
LVM for pt to call office regarding normal chest xray. Instructed pt to call office with any questions.        ----- Message from ARIANA Kumar sent at 1/22/2020  1:36 PM EST -----  marisa

## 2020-01-30 ENCOUNTER — TELEPHONE (OUTPATIENT)
Dept: BARIATRICS/WEIGHT MGMT | Facility: CLINIC | Age: 40
End: 2020-01-30

## 2020-01-30 NOTE — TELEPHONE ENCOUNTER
LM for patient regarding normal chest xray. instruted patient to call back with any questions or concerns.

## 2020-01-31 ENCOUNTER — TELEPHONE (OUTPATIENT)
Dept: BARIATRICS/WEIGHT MGMT | Facility: CLINIC | Age: 40
End: 2020-01-31

## 2020-01-31 NOTE — TELEPHONE ENCOUNTER
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 I also informed her that I will reach back out to her by Tuesday when Areli is in the office.

## 2020-01-31 NOTE — TELEPHONE ENCOUNTER
I forwarded this to Areli since she met this patient and knows her history and has done a physical exam.  However.  Without knowing her and her history you can let her know that generally a prolonged QT interval means that is taking longer for the heart to recharge between beats than it normally does.  And she has a prolonged NM interval which means that there is a delay between conduction between the atria and the ventricles which is sometimes called first-degree heart block.  Most of the time first-degree heart block is benign.  There are several medications that may contribute to a prolonged QT interval at these primarily consist of antipsychotic meds, certain antidepressants, and certain antibiotics none of which I see on her medication list.  Likely we will send her for cardiac clearance but as I have not seen her I will refer to Nuria when she is back on Tuesday.

## 2020-02-05 ENCOUNTER — TELEPHONE (OUTPATIENT)
Dept: BARIATRICS/WEIGHT MGMT | Facility: CLINIC | Age: 40
End: 2020-02-05

## 2020-02-05 NOTE — TELEPHONE ENCOUNTER
Spoke to patient and let her know about EKG results.     ----- Message from ARIANA Kumar sent at 2/5/2020  9:08 AM EST -----  Per Dr. Betancourt this is ok

## 2020-02-11 ENCOUNTER — HOSPITAL ENCOUNTER (OUTPATIENT)
Dept: SLEEP MEDICINE | Facility: HOSPITAL | Age: 40
Discharge: HOME | End: 2020-02-11
Admitting: NURSE PRACTITIONER

## 2020-02-11 DIAGNOSIS — I10 ESSENTIAL HYPERTENSION: ICD-10-CM

## 2020-02-11 DIAGNOSIS — E66.01 MORBID OBESITY WITH BMI OF 50.0-59.9, ADULT (HCC): ICD-10-CM

## 2020-02-11 DIAGNOSIS — R06.83 SNORING: ICD-10-CM

## 2020-02-11 DIAGNOSIS — Z01.818 PRE-OP EVALUATION: ICD-10-CM

## 2020-02-11 PROCEDURE — 95806 SLEEP STUDY UNATT&RESP EFFT: CPT

## 2020-02-11 PROCEDURE — 95806 SLEEP STUDY UNATT&RESP EFFT: CPT | Performed by: INTERNAL MEDICINE

## 2020-02-14 ENCOUNTER — TELEPHONE (OUTPATIENT)
Dept: SLEEP MEDICINE | Facility: HOSPITAL | Age: 40
End: 2020-02-14

## 2020-02-14 NOTE — TELEPHONE ENCOUNTER
Called pt with sleep study results and faxed orders to [facility] on 2/14/2020.  F/u appt scheduled on 4/16/2020 @ 1:30 pm with Dr. Chong.  CV

## 2020-02-21 DIAGNOSIS — E66.01 MORBID OBESITY WITH BMI OF 50.0-59.9, ADULT (HCC): ICD-10-CM

## 2020-02-21 DIAGNOSIS — R06.83 SNORING: ICD-10-CM

## 2020-02-21 DIAGNOSIS — G47.33 OSA (OBSTRUCTIVE SLEEP APNEA): Primary | ICD-10-CM

## 2020-03-03 ENCOUNTER — ANESTHESIA EVENT (OUTPATIENT)
Dept: GASTROENTEROLOGY | Facility: HOSPITAL | Age: 40
End: 2020-03-03

## 2020-03-03 ENCOUNTER — ANESTHESIA (OUTPATIENT)
Dept: GASTROENTEROLOGY | Facility: HOSPITAL | Age: 40
End: 2020-03-03

## 2020-03-03 ENCOUNTER — HOSPITAL ENCOUNTER (OUTPATIENT)
Facility: HOSPITAL | Age: 40
Setting detail: HOSPITAL OUTPATIENT SURGERY
Discharge: HOME | End: 2020-03-03
Attending: SURGERY | Admitting: SURGERY

## 2020-03-03 VITALS
WEIGHT: 293 LBS | SYSTOLIC BLOOD PRESSURE: 111 MMHG | HEART RATE: 71 BPM | TEMPERATURE: 97.6 F | OXYGEN SATURATION: 100 % | DIASTOLIC BLOOD PRESSURE: 75 MMHG | RESPIRATION RATE: 16 BRPM | HEIGHT: 66 IN | BODY MASS INDEX: 47.09 KG/M2

## 2020-03-03 DIAGNOSIS — E66.01 MORBID OBESITY WITH BMI OF 50.0-59.9, ADULT (HCC): ICD-10-CM

## 2020-03-03 DIAGNOSIS — Z01.818 PRE-OP EVALUATION: ICD-10-CM

## 2020-03-03 DIAGNOSIS — K21.9 GASTROESOPHAGEAL REFLUX DISEASE, ESOPHAGITIS PRESENCE NOT SPECIFIED: ICD-10-CM

## 2020-03-03 PROCEDURE — 87081 CULTURE SCREEN ONLY: CPT | Performed by: SURGERY

## 2020-03-03 PROCEDURE — 25010000002 PROPOFOL 10 MG/ML EMULSION: Performed by: ANESTHESIOLOGY

## 2020-03-03 PROCEDURE — 43239 EGD BIOPSY SINGLE/MULTIPLE: CPT | Performed by: SURGERY

## 2020-03-03 PROCEDURE — 81025 URINE PREGNANCY TEST: CPT | Performed by: SURGERY

## 2020-03-03 RX ORDER — PROPOFOL 10 MG/ML
VIAL (ML) INTRAVENOUS CONTINUOUS PRN
Status: DISCONTINUED | OUTPATIENT
Start: 2020-03-03 | End: 2020-03-03 | Stop reason: SURG

## 2020-03-03 RX ORDER — PROPOFOL 10 MG/ML
VIAL (ML) INTRAVENOUS AS NEEDED
Status: DISCONTINUED | OUTPATIENT
Start: 2020-03-03 | End: 2020-03-03 | Stop reason: SURG

## 2020-03-03 RX ORDER — SODIUM CHLORIDE, SODIUM LACTATE, POTASSIUM CHLORIDE, CALCIUM CHLORIDE 600; 310; 30; 20 MG/100ML; MG/100ML; MG/100ML; MG/100ML
1000 INJECTION, SOLUTION INTRAVENOUS CONTINUOUS
Status: DISCONTINUED | OUTPATIENT
Start: 2020-03-03 | End: 2020-03-03 | Stop reason: HOSPADM

## 2020-03-03 RX ADMIN — PROPOFOL 125 MCG/KG/MIN: 10 INJECTION, EMULSION INTRAVENOUS at 07:13

## 2020-03-03 RX ADMIN — PROPOFOL 75 MG: 10 INJECTION, EMULSION INTRAVENOUS at 07:13

## 2020-03-03 RX ADMIN — SODIUM CHLORIDE, POTASSIUM CHLORIDE, SODIUM LACTATE AND CALCIUM CHLORIDE 1000 ML: 600; 310; 30; 20 INJECTION, SOLUTION INTRAVENOUS at 06:42

## 2020-03-03 NOTE — H&P
Patient Care Team:  Trena Ferro APRN as PCP - General (Nurse Practitioner)    Chief complaint Heartburn and in need of preoperative clearance prior to surgery    Subjective     Patient is a 39 y.o. female who is a patient of ours and has undergone our extensive initial evaluation for bariatric surgery and needs to proceed with upper endoscopy for preoperative clearance prior to proceeding with surgery.  Please see the initial history and physical for further detailed information.      Review of Systems   Pertinent items are noted in HPI and no changes since last visit.    Past Medical History:   Diagnosis Date   • Hip pain 1/7/2019   • HPV in female    • Kidney stone    • Kidney stones 1/7/2019   • Migraine    • PCOS (polycystic ovarian syndrome)      Past Surgical History:   Procedure Laterality Date   • ENDOSCOPY  2010   • LAPAROSCOPIC CHOLECYSTECTOMY  2010   • MANDIBLE SURGERY     • ORIF ANKLE FRACTURE       Family History   Problem Relation Age of Onset   • Breast cancer Maternal Grandmother    • Hypertension Maternal Grandmother    • Cancer Maternal Grandmother         BREAST & BONE   • Obesity Mother    • Diabetes Mother    • Hypertension Mother    • Heart attack Mother    • Heart disease Mother    • Sleep apnea Mother    • Hypertension Father    • Heart disease Brother    • Hypertension Maternal Grandfather    • Heart disease Maternal Grandfather    • Cancer Maternal Grandfather    • Hypertension Paternal Grandmother    • Multiple sclerosis Paternal Grandmother    • Hypertension Paternal Grandfather    • Cancer Paternal Grandfather         LUNG     Social History     Tobacco Use   • Smoking status: Never Smoker   • Smokeless tobacco: Never Used   Substance Use Topics   • Alcohol use: Not Currently     Frequency: Never     Comment: OCCASSIONALLY   • Drug use: No     Medications Prior to Admission   Medication Sig Dispense Refill Last Dose   • ascorbic acid (VITAMIN C) 100 MG tablet Take 100 mg by  mouth.   3/2/2020 at Unknown time   • B Complex Vitamins (VITAMIN B COMPLEX) capsule capsule Take  by mouth Daily.   3/2/2020 at Unknown time   • cholecalciferol (VITAMIN D3) 1000 units tablet Take 1,000 Units by mouth Daily.   3/2/2020 at Unknown time   • FLUoxetine HCl (PROZAC PO) Take 60 mg by mouth Daily.   3/2/2020 at Unknown time   • gabapentin (NEURONTIN) 100 MG capsule Take 100-300 mg by mouth.   3/2/2020 at Unknown time   • hydrOXYzine (ATARAX) 25 MG tablet Take 25 mg by mouth.   3/2/2020 at Unknown time   • lansoprazole (PREVACID) 30 MG capsule Take 30 mg by mouth Daily.   3/2/2020 at Unknown time   • lisinopril (PRINIVIL,ZESTRIL) 10 MG tablet Take 10 mg by mouth Daily.   3/2/2020 at Unknown time   • Magnesium 100 MG capsule Take  by mouth.   3/2/2020 at Unknown time   • Multiple Vitamin (MULTI VITAMIN DAILY PO) Take  by mouth.   3/2/2020 at Unknown time   • Omega-3 Fatty Acids (FISH OIL) 1000 MG capsule capsule Take  by mouth.   3/2/2020 at Unknown time   • propranolol (INDERAL) 20 MG tablet Take 5 mg by mouth.   3/2/2020 at Unknown time   • SUMAtriptan (IMITREX) 100 MG tablet Take 100 mg by mouth Every 2 (Two) Hours As Needed for Migraine. Take one tablet at onset of headache. May repeat dose one time in 2 hours if headache not relieved.   Past Week at Unknown time   • Erenumab-aooe 70 MG/ML solution auto-injector Inject 140 mg under the skin into the appropriate area as directed.   3/1/2020   • Insulin Pen Needle (PEN NEEDLES) 31G X 6 MM misc    Unknown at Unknown time   • levonorgestrel (MIRENA, 52 MG,) 20 MCG/24HR IUD 1 each by Intrauterine route 1 (One) Time.   More than a month at Unknown time   • promethazine (PHENERGAN) 12.5 MG suppository Insert 12.5 mg into the rectum Every 6 (Six) Hours As Needed for Nausea or Vomiting.   More than a month at Unknown time     Allergies:  Patient has no known allergies.    Vital Signs  See PreOp record  Temp:  [97.6 °F (36.4 °C)] 97.6 °F (36.4 °C)  Heart Rate:   "[76] 76  Resp:  [16] 16  BP: (144)/(90) 144/90    Flowsheet Rows      First Filed Value   Admission Height  167.6 cm (66\") Documented at 03/03/2020 0619   Admission Weight  (!) 148 kg (326 lb 9.6 oz) Documented at 03/03/2020 0619           Physical Exam:   Heart: RR  Lungs: CTA B  Abd: soft and NT/ND  Ext: no clubbing, cyanosis    Results Review:    I have reviewed the patient's clinical results      Morbid obesity with BMI of 50.0-59.9, adult (CMS/[facility])    Pre-op evaluation    Gastroesophageal reflux disease      The risks and benefits of the procedure were discussed with the patient in detail and all questions were answered.  Possibility of perforation, bleeding, aspiration, anoxic brain injury, respiratory and/or cardiac arrest and death were discussed.  Consent will be signed and witnessed..     Shady Betancourt MD  03/03/20  7:00 AM  Time: Approximately 15 minutes was spent with the patient and over half that time was spent counseling.  All of the patients questions were answered.    "

## 2020-03-03 NOTE — DISCHARGE INSTRUCTIONS
For the next 24 hours patient needs to be with a responsible adult.    For 24 hours DO NOT drive, operate machinery, appliances, drink alcohol, make important decisions or sign legal documents.    Start with a light or bland diet if you are feeling sick to your stomach otherwise advance to regular diet as tolerated.    Follow recommendations on procedure report if provided by your doctor.    Call Dr RAMEY for problems [phone]    Problems may include but not limited to: large amounts of bleeding, trouble breathing, repeated vomiting, severe unrelieved pain, fever or chills.

## 2020-03-03 NOTE — ANESTHESIA POSTPROCEDURE EVALUATION
Patient: Lizette CEDEÑO    Procedure Summary     Date:  03/03/20 Room / Location:   [facility] /  [facility]    Anesthesia Start:  0711 Anesthesia Stop:  0722    Procedure:  ESOPHAGOGASTRODUODENOSCOPY WITH BIOPSY (N/A Esophagus) Diagnosis:       Morbid obesity with BMI of 50.0-59.9, adult (CMS/[facility])      Gastroesophageal reflux disease, esophagitis presence not specified      Pre-op evaluation      (Morbid obesity with BMI of 50.0-59.9, adult (CMS/[facility]) [E66.01, Z68.43])      (Gastroesophageal reflux disease, esophagitis presence not specified [K21.9])      (Pre-op evaluation [Z01.818])    Surgeon:  Shady Betancourt Jr., MD Provider:  Max Hickey MD    Anesthesia Type:  MAC ASA Status:  3          Anesthesia Type: MAC    Vitals  Vitals Value Taken Time   /75 3/3/2020  7:39 AM   Temp     Pulse 71 3/3/2020  7:39 AM   Resp 16 3/3/2020  7:39 AM   SpO2 100 % 3/3/2020  7:39 AM           Post Anesthesia Care and Evaluation    Patient location during evaluation: PACU  Patient participation: complete - patient participated  Level of consciousness: awake  Pain score: 1  Pain management: adequate  Airway patency: patent  Anesthetic complications: No anesthetic complications  PONV Status: none  Cardiovascular status: acceptable  Respiratory status: acceptable  Hydration status: acceptable

## 2020-03-03 NOTE — ANESTHESIA PREPROCEDURE EVALUATION
Anesthesia Evaluation     Patient summary reviewed                Airway   No difficulty expected  Dental      Pulmonary    Cardiovascular     ECG reviewed  Rhythm: regular    (+) hyperlipidemia,       Neuro/Psych  GI/Hepatic/Renal/Endo    (+) obesity,       Musculoskeletal     Abdominal    Substance History      OB/GYN          Other                        Anesthesia Plan    ASA 3     MAC       Anesthetic plan, all risks, benefits, and alternatives have been provided, discussed and informed consent has been obtained with: patient.

## 2020-03-03 NOTE — OP NOTE
Surgeon: Shady Betancourt Jr., M.D.    Preoperative Diagnosis: Gastroesophageal reflux disease    Postoperative Diagnosis: Gastritis    Procedure Performed: Transoral esophagogastroduodenoscopy with antral biopsies    Indications: 39-year-old female with morbid obesity with complaints of heartburn.  Patient does take Prevacid on a regular basis.    Procedure:     The procedure, indications, preparation and potential complications were explained to the patient, who indicated understanding and signed the corresponding consent forms.  The patient was identified, taken to the endoscopy suite, and placed on the left side down decubitus position.  The patient underwent a MAC anesthesia and was appropriately monitored through the case by the anesthesia personnel with continuous pulse oximetry, blood pressure, and cardiac monitoring.  A bite block was placed.  After adequate IV sedation and using a transoral technique a lubed flexible endoscope was placed in the hypopharynx and advanced to the second portion of the duodenum without difficulty. The scope was then withdrawn back into the antrum of the stomach.  Cold forcep biopsies of the antrum were taken to rule out Helicobacter pylori.  The scope was retroflexed noting the body, fundus and cardia.  The scope was then withdrawn back into the esophagus after decompressing the stomach.  The Z line was noted and GE junction measured from the incisors.  The scope was then completely withdrawn.  The patient tolerated the procedure well and left the endoscopy suite in stable condition.  The findings are listed below.    Duodenum: Unremarkable  Antrum: Mild patchy erythema  Body/Fundus: Unremarkable  Cardia: Unremarkable  Esophagus: Z line regular, no erosive esophagitis; GE junction measured approximately 40 cm from the incisors    Recommendations:     Await biopsy results and follow-up in the office

## 2020-03-04 LAB — UREASE TISS QL: NEGATIVE

## 2020-03-06 ENCOUNTER — TELEPHONE (OUTPATIENT)
Dept: BARIATRICS/WEIGHT MGMT | Facility: CLINIC | Age: 40
End: 2020-03-06

## 2020-03-06 ENCOUNTER — PREP FOR SURGERY (OUTPATIENT)
Dept: OTHER | Facility: HOSPITAL | Age: 40
End: 2020-03-06

## 2020-03-06 DIAGNOSIS — E66.01 CLASS 3 SEVERE OBESITY DUE TO EXCESS CALORIES WITH SERIOUS COMORBIDITY AND BODY MASS INDEX (BMI) OF 50.0 TO 59.9 IN ADULT (HCC): Primary | ICD-10-CM

## 2020-03-06 PROBLEM — E66.813 CLASS 3 SEVERE OBESITY DUE TO EXCESS CALORIES WITH SERIOUS COMORBIDITY AND BODY MASS INDEX (BMI) OF 50.0 TO 59.9 IN ADULT: Status: ACTIVE | Noted: 2020-03-06

## 2020-03-06 RX ORDER — SCOLOPAMINE TRANSDERMAL SYSTEM 1 MG/1
1 PATCH, EXTENDED RELEASE TRANSDERMAL CONTINUOUS
Status: CANCELLED | OUTPATIENT
Start: 2020-03-25 | End: 2020-03-28

## 2020-03-06 RX ORDER — SODIUM CHLORIDE, SODIUM LACTATE, POTASSIUM CHLORIDE, CALCIUM CHLORIDE 600; 310; 30; 20 MG/100ML; MG/100ML; MG/100ML; MG/100ML
100 INJECTION, SOLUTION INTRAVENOUS CONTINUOUS
Status: CANCELLED | OUTPATIENT
Start: 2020-03-25

## 2020-03-06 RX ORDER — ACETAMINOPHEN 160 MG/5ML
975 SOLUTION ORAL ONCE
Status: CANCELLED | OUTPATIENT
Start: 2020-03-25 | End: 2020-03-06

## 2020-03-06 RX ORDER — SODIUM CHLORIDE 0.9 % (FLUSH) 0.9 %
3-10 SYRINGE (ML) INJECTION AS NEEDED
Status: CANCELLED | OUTPATIENT
Start: 2020-03-25

## 2020-03-06 RX ORDER — CEFAZOLIN SODIUM IN 0.9 % NACL 3 G/100 ML
3 INTRAVENOUS SOLUTION, PIGGYBACK (ML) INTRAVENOUS
Status: CANCELLED | OUTPATIENT
Start: 2020-03-25

## 2020-03-06 RX ORDER — METOCLOPRAMIDE HYDROCHLORIDE 5 MG/ML
10 INJECTION INTRAMUSCULAR; INTRAVENOUS ONCE
Status: CANCELLED | OUTPATIENT
Start: 2020-03-25 | End: 2020-03-06

## 2020-03-06 RX ORDER — SODIUM CHLORIDE 0.9 % (FLUSH) 0.9 %
3 SYRINGE (ML) INJECTION EVERY 12 HOURS SCHEDULED
Status: CANCELLED | OUTPATIENT
Start: 2020-03-06

## 2020-03-06 RX ORDER — PANTOPRAZOLE SODIUM 40 MG/10ML
40 INJECTION, POWDER, LYOPHILIZED, FOR SOLUTION INTRAVENOUS ONCE
Status: CANCELLED | OUTPATIENT
Start: 2020-03-25 | End: 2020-03-06

## 2020-03-06 RX ORDER — CHLORHEXIDINE GLUCONATE 0.12 MG/ML
15 RINSE ORAL SEE ADMIN INSTRUCTIONS
Status: CANCELLED | OUTPATIENT
Start: 2020-03-25

## 2020-03-12 ENCOUNTER — CONSULT (OUTPATIENT)
Dept: BARIATRICS/WEIGHT MGMT | Facility: CLINIC | Age: 40
End: 2020-03-12

## 2020-03-12 VITALS
HEIGHT: 66 IN | RESPIRATION RATE: 18 BRPM | HEART RATE: 67 BPM | BODY MASS INDEX: 47.09 KG/M2 | SYSTOLIC BLOOD PRESSURE: 155 MMHG | TEMPERATURE: 98.4 F | DIASTOLIC BLOOD PRESSURE: 98 MMHG | WEIGHT: 293 LBS

## 2020-03-12 DIAGNOSIS — E55.9 VITAMIN D DEFICIENCY: ICD-10-CM

## 2020-03-12 DIAGNOSIS — E78.1 HYPERTRIGLYCERIDEMIA: ICD-10-CM

## 2020-03-12 DIAGNOSIS — F41.9 ANXIETY AND DEPRESSION: ICD-10-CM

## 2020-03-12 DIAGNOSIS — R53.82 CHRONIC FATIGUE: ICD-10-CM

## 2020-03-12 DIAGNOSIS — Z71.3 DIETARY COUNSELING: ICD-10-CM

## 2020-03-12 DIAGNOSIS — E28.2 PCOS (POLYCYSTIC OVARIAN SYNDROME): ICD-10-CM

## 2020-03-12 DIAGNOSIS — F32.A ANXIETY AND DEPRESSION: ICD-10-CM

## 2020-03-12 DIAGNOSIS — E66.01 CLASS 3 SEVERE OBESITY DUE TO EXCESS CALORIES WITH SERIOUS COMORBIDITY AND BODY MASS INDEX (BMI) OF 50.0 TO 59.9 IN ADULT (HCC): Primary | ICD-10-CM

## 2020-03-12 DIAGNOSIS — I10 ESSENTIAL HYPERTENSION: ICD-10-CM

## 2020-03-12 DIAGNOSIS — K21.9 GASTROESOPHAGEAL REFLUX DISEASE WITHOUT ESOPHAGITIS: ICD-10-CM

## 2020-03-12 PROCEDURE — 99215 OFFICE O/P EST HI 40 MIN: CPT | Performed by: SURGERY

## 2020-03-12 NOTE — PATIENT INSTRUCTIONS
Bariatric Manual    You were provided a manual specific to the procedure that you have chosen.  Please refer to that with any questions or call the office at [phone]

## 2020-03-12 NOTE — H&P
Bariatric Consult:  Referred by Trena Ferro APRN    Lizette CEDEÑO is here today for consult on Consult (sleeve consult)      History of Present Illness:     Lizette CEDEÑO is a 39 y.o. female with morbid obesity with co-morbidities including sleep apnea, hypertension, back pain, knee pain, GERD, polycystic ovarian disease and depression who presents for surgical consultation for the above procedure. Lizette has completed the initial intake visit and has been examined by our nurse practitioner, dietician, psychologist and underwent the extensive educational teaching process under the guidance of our bariatric coordinator and myself. Lizette also has seen the educational video MARIANNA on the surgical procedure if available. Lizette attended today more educational teaching from our bariatric coordinator and myself. Lizette has had an extensive medical workup including a visit with their primary care physician, EKG, chest radiograph, blood work, EGD or UGI and possibly further testing. These have been reviewed by me and discussed with the patient. Lizette is now ready to proceed with surgery. Lizette presently denies nausea, vomiting, fever, chills, chest pain, shortness of air, melena, hematochezia, hemetemesis, dysuria, frequency, hematuria, jaundice or abdominal pain.       Past Medical History:   Diagnosis Date   • Hip pain 1/7/2019   • HPV in female    • Kidney stone    • Kidney stones 1/7/2019   • Migraine    • PCOS (polycystic ovarian syndrome)        Encounter Diagnoses   Name Primary?   • Class 3 severe obesity due to excess calories with serious comorbidity and body mass index (BMI) of 50.0 to 59.9 in adult (CMS/HCC) Yes   • Essential hypertension    • Gastroesophageal reflux disease without esophagitis    • PCOS (polycystic ovarian syndrome)    • Vitamin D deficiency    • Hypertriglyceridemia    • Dietary counseling    • Chronic fatigue    • Anxiety and depression        Past Surgical History:   Procedure  Laterality Date   • ENDOSCOPY  2010   • ENDOSCOPY N/A 3/3/2020    Procedure: ESOPHAGOGASTRODUODENOSCOPY WITH BIOPSY;  Surgeon: Shady Betancourt Jr., MD;  Location: [facility] ENDOSCOPY;  Service: General;  Laterality: N/A;  PRE- GERD  POST- GASTRITIS   • LAPAROSCOPIC CHOLECYSTECTOMY  2010   • MANDIBLE SURGERY     • ORIF ANKLE FRACTURE         Patient Active Problem List   Diagnosis   • Chronic fatigue   • GERD (gastroesophageal reflux disease)   • Anxiety and depression   • Essential hypertension   • Vitamin D deficiency   • PCOS (polycystic ovarian syndrome)   • Hypertriglyceridemia   • Migraine   • Dietary counseling   • Pre-op evaluation   • Snoring   • Class 3 severe obesity due to excess calories with serious comorbidity and body mass index (BMI) of 50.0 to 59.9 in adult (CMS/[facility])       No Known Allergies      Current Outpatient Medications:   •  ascorbic acid (VITAMIN C) 100 MG tablet, Take 100 mg by mouth., Disp: , Rfl:   •  B Complex Vitamins (VITAMIN B COMPLEX) capsule capsule, Take  by mouth Daily., Disp: , Rfl:   •  cholecalciferol (VITAMIN D3) 1000 units tablet, Take 1,000 Units by mouth Daily., Disp: , Rfl:   •  Erenumab-aooe 70 MG/ML solution auto-injector, Inject 140 mg under the skin into the appropriate area as directed., Disp: , Rfl:   •  FLUoxetine HCl (PROZAC PO), Take 60 mg by mouth Daily., Disp: , Rfl:   •  gabapentin (NEURONTIN) 100 MG capsule, Take 100-300 mg by mouth., Disp: , Rfl:   •  hydrOXYzine (ATARAX) 25 MG tablet, Take 25 mg by mouth., Disp: , Rfl:   •  Insulin Pen Needle (PEN NEEDLES) 31G X 6 MM misc, , Disp: , Rfl:   •  lansoprazole (PREVACID) 30 MG capsule, Take 30 mg by mouth Daily., Disp: , Rfl:   •  levonorgestrel (MIRENA, 52 MG,) 20 MCG/24HR IUD, 1 each by Intrauterine route 1 (One) Time., Disp: , Rfl:   •  lisinopril (PRINIVIL,ZESTRIL) 10 MG tablet, Take 10 mg by mouth Daily., Disp: , Rfl:   •  Magnesium 100 MG capsule, Take  by mouth., Disp: , Rfl:   •  Multiple Vitamin (MULTI  VITAMIN DAILY PO), Take  by mouth., Disp: , Rfl:   •  Omega-3 Fatty Acids (FISH OIL) 1000 MG capsule capsule, Take  by mouth., Disp: , Rfl:   •  promethazine (PHENERGAN) 12.5 MG suppository, Insert 12.5 mg into the rectum Every 6 (Six) Hours As Needed for Nausea or Vomiting., Disp: , Rfl:   •  propranolol (INDERAL) 20 MG tablet, Take 5 mg by mouth., Disp: , Rfl:   •  SUMAtriptan (IMITREX) 100 MG tablet, Take 100 mg by mouth Every 2 (Two) Hours As Needed for Migraine. Take one tablet at onset of headache. May repeat dose one time in 2 hours if headache not relieved., Disp: , Rfl:   •  enoxaparin (LOVENOX) 40 MG/0.4ML solution syringe, Inject 0.4 mL under the skin into the appropriate area as directed Daily for 14 days. Start after surgery unless instructed otherwise, Disp: 14 syringe, Rfl: 0  •  folic acid-vit B6-vit B12 (FOLBEE) 2.5-25-1 MG tablet tablet, Take 1 tablet by mouth Daily., Disp: 40 tablet, Rfl: 0    Social History     Socioeconomic History   • Marital status:      Spouse name: Not on file   • Number of children: 3   • Years of education: Not on file   • Highest education level: Not on file   Tobacco Use   • Smoking status: Never Smoker   • Smokeless tobacco: Never Used   Substance and Sexual Activity   • Alcohol use: Not Currently     Frequency: Never     Comment: OCCASSIONALLY   • Drug use: No   • Sexual activity: Defer     Birth control/protection: IUD       Family History   Problem Relation Age of Onset   • Breast cancer Maternal Grandmother    • Hypertension Maternal Grandmother    • Cancer Maternal Grandmother         BREAST & BONE   • Obesity Mother    • Diabetes Mother    • Hypertension Mother    • Heart attack Mother    • Heart disease Mother    • Sleep apnea Mother    • Hypertension Father    • Heart disease Brother    • Hypertension Maternal Grandfather    • Heart disease Maternal Grandfather    • Cancer Maternal Grandfather    • Hypertension Paternal Grandmother    • Multiple sclerosis  Paternal Grandmother    • Hypertension Paternal Grandfather    • Cancer Paternal Grandfather         LUNG       Review of Systems:  Review of Systems   Constitutional: Positive for fatigue.   Musculoskeletal: Positive for arthralgias and back pain.   All other systems reviewed and are negative.        Physical Exam:    Vital Signs:  Weight: (!) 151 kg (332 lb)   Body mass index is 53.61 kg/m².  Temp: 98.4 °F (36.9 °C)   Heart Rate: 67   BP: 155/98       Physical Exam   Constitutional: She is oriented to person, place, and time. She appears well-nourished.   HENT:   Head: Normocephalic and atraumatic.   Mouth/Throat: Oropharynx is clear and moist.   Eyes: Pupils are equal, round, and reactive to light. Conjunctivae and EOM are normal. No scleral icterus.   Neck: Normal range of motion. Neck supple. No thyromegaly present.   Cardiovascular: Normal rate and regular rhythm.   Pulmonary/Chest: Effort normal and breath sounds normal.   Abdominal: Soft. Bowel sounds are normal. She exhibits no distension and no mass. There is no tenderness. There is no rebound and no guarding. No hernia.   Musculoskeletal: Normal range of motion.   Lymphadenopathy:     She has no cervical adenopathy.   Neurological: She is alert and oriented to person, place, and time. No cranial nerve deficit. Coordination normal.   Skin: Skin is warm and dry. No erythema.   Psychiatric: She has a normal mood and affect. Her behavior is normal.   Vitals reviewed.        Assessment:    Lizette CEDEÑO is a 39 y.o. year old female with medically complicated severe obesity with a BMI of Body mass index is 53.61 kg/m². and multiple co-morbidities listed in the encounter diagnosis.    I think she is an appropriate candidate for this surgery, and is ready to proceed.      Plan/Discussion/Summary:  No hiatal hernia per me.  Patient does take PPI.  H. pylori negative    The patient has returned to the office for a surgical consultation and has requested to  proceed with a laparoscopic gastric sleeve.  I have had the opportunity to obtain a history, examine the patient and review the patient's chart.    The patient understands that surgery is a tool and that weight loss is not guaranteed but only seen in the context of appropriate use, regular follow up, exercise and making appropriate food choices.     I personally discussed the potential complications of the laparoscopic gastric sleeve with this patient.  The patient is well aware of potential complications of the surgery that include but not limited to bleeding, infections, deep vein thrombosis, pulmonary embolism, pulmonary complications such as pneumonia, cardiac event, hernias, small bowel obstruction, damage to the spleen or other organs, bowel injury, disfiguring scars, failure to lose weight, need for additional surgery, conversion to an open procedure and death.  The patient is also aware of complications which apply in particular to the gastric sleeve and can include but not limited to the leakage of gastric contents at the staple line, the development of an intra-abdominal abscess, gastroesophageal reflux disease, Baptiste's esophagus, ulcers, vitamin/mineral deficiencies, strictures, and the possibility of converting this procedure to a Shannan-en-Y gastric bypass. The patient also understands the possibility of requiring an acid reducer medication for the rest of their life.    The risks, benefits, potential complications and alternative therapies were discussed at great length as outlined in our extensive consent forms, online consent and educational teaching processes.    The patient has confirmed the participation in the programs extensive educational activities.    All questions and concerns were answered to patient's satisfaction.  The patient now wishes to proceed with surgery.    Patient has declined the pre-operative insertion of an IVC filter.     The patient has agreed to a postoperative course of  anitcoagulant therapy.      I instructed patient to start on a H2 blocker or proton pump inhibitor if not already on one of these medications.    I explained in detail the procedures that we perform.  All of these procedures have a chance to convert to open if any technical challenges or complications do occur.  Bariatric surgery is not cosmetic surgery but rather a tool to help a patient make a life-long commitment lifestyle change including diet, exercise, behavior changes, and taking supplemental vitamins and minerals.    Problems after surgery may require more operations to correct them.    The risks, benefits, alternatives, and potential complications of all of the procedures were explained in detail including, but not limited to death, anesthesia and medication adverse effect, deep venous thrombosis, pulmonary embolism, trocar site/incisional hernia, wound infection, abdominal infection, bleeding, failure to lose weight, gain weight, a change in body image, metabolic complications with vitamin deficiences and anemia.    Weight loss expectations were discussed with the patient in detail. The weight loss operations most commonly performed are the sleeve gastrectomy and the Shannan-en-Y gastric bypass. These operations result in weight losses up to approximately 25-35% of initial body weight 12 to 24 months after surgery with the gastric bypass usually the higher percent of weight loss but depends on patient using the tool.    For the gastric bypass and loop duodenal switch (MARLEY-S) the risks include but not limited to the following early complications:  Anastomotic leak/peritonitis, Shannan/Alimentary/biliopancreatic limb obstruction, severe & minor wound infection/seroma, and nausea/vomiting.  Late complications can include but are not limited to malnutrition, vitamin deficiencies, frequent loose stools,  stomal stenosis, marginal ulcer, bowel obstruction, intussusception, internal, and incisional  hernia.    Regarding the gastric sleeve, there is less long-term outcome data and higher risk of dysphagia and reflux compared to a gastric bypass, as well as risk of internal visceral/organ injury, splenectomy, bleeding, infection, leak (which could require further intervention possible conversion to Shannan-en-Y gastric bypass), stenosis and possibility of regaining weight.    Lizette was counseled regarding diagnostic results, instructions for management, risk factor reductions, prognosis, patient and family education, impressions, risks and benefits of treatment options and importance of compliance with treatment. Total face to face time of the encounter was over 45 minutes and over 30 minutes was spent counseling.     Lenin Report   As part of this patient's treatment plan I am prescribing controlled substances. The patient has been made aware of appropriate use of such medications, including potential risk of somnolence, limited ability to drive and /or work safely, and potential for dependence or overdose. It has also been made clear that these medications are for use by this patient only, without concomitant use of alcohol or other substances unless prescribed.    Lizette has completed prescribing agreement detailing terms of continued prescribing of controlled substances, including monitoring LENIN reports, urine drug screening, and pill counts if necessary. Lizette is aware that inappropriate use will result in cessation of prescribing such medications.    LENIN report has been reviewed      History and physical exam exhibit continued safe and appropriate use of controlled substances.      Lizette understands the surgical procedures and the different surgical options that are available.  She understands the lifestyle changes that are required after surgery and has agreed to follow the guidelines outlined in the weight management program.  She also expressed understanding of the risks involved and had all of  female questions answered and desires to proceed.      Shady Betancourt MD  3/12/2020

## 2020-03-16 ENCOUNTER — APPOINTMENT (OUTPATIENT)
Dept: PREADMISSION TESTING | Facility: HOSPITAL | Age: 40
End: 2020-03-16

## 2020-03-16 VITALS
TEMPERATURE: 98.2 F | DIASTOLIC BLOOD PRESSURE: 82 MMHG | RESPIRATION RATE: 18 BRPM | OXYGEN SATURATION: 98 % | HEIGHT: 66 IN | SYSTOLIC BLOOD PRESSURE: 117 MMHG | HEART RATE: 62 BPM | BODY MASS INDEX: 53.59 KG/M2

## 2020-03-16 DIAGNOSIS — E66.01 CLASS 3 SEVERE OBESITY DUE TO EXCESS CALORIES WITH SERIOUS COMORBIDITY AND BODY MASS INDEX (BMI) OF 50.0 TO 59.9 IN ADULT (HCC): ICD-10-CM

## 2020-03-16 LAB
ALBUMIN SERPL-MCNC: 4.3 G/DL (ref 3.5–5.2)
ALBUMIN/GLOB SERPL: 1.5 G/DL
ALP SERPL-CCNC: 71 U/L (ref 39–117)
ALT SERPL W P-5'-P-CCNC: 13 U/L (ref 1–33)
ANION GAP SERPL CALCULATED.3IONS-SCNC: 10.6 MMOL/L (ref 5–15)
AST SERPL-CCNC: 14 U/L (ref 1–32)
BILIRUB SERPL-MCNC: 0.3 MG/DL (ref 0.2–1.2)
BUN BLD-MCNC: 33 MG/DL (ref 6–20)
BUN/CREAT SERPL: 37.9 (ref 7–25)
CALCIUM SPEC-SCNC: 9.2 MG/DL (ref 8.6–10.5)
CHLORIDE SERPL-SCNC: 101 MMOL/L (ref 98–107)
CO2 SERPL-SCNC: 24.4 MMOL/L (ref 22–29)
CREAT BLD-MCNC: 0.87 MG/DL (ref 0.57–1)
DEPRECATED RDW RBC AUTO: 38.9 FL (ref 37–54)
ERYTHROCYTE [DISTWIDTH] IN BLOOD BY AUTOMATED COUNT: 11.9 % (ref 12.3–15.4)
GFR SERPL CREATININE-BSD FRML MDRD: 73 ML/MIN/1.73
GLOBULIN UR ELPH-MCNC: 2.8 GM/DL
GLUCOSE BLD-MCNC: 96 MG/DL (ref 65–99)
HCT VFR BLD AUTO: 43.3 % (ref 34–46.6)
HGB BLD-MCNC: 14.3 G/DL (ref 12–15.9)
MCH RBC QN AUTO: 29.8 PG (ref 26.6–33)
MCHC RBC AUTO-ENTMCNC: 33 G/DL (ref 31.5–35.7)
MCV RBC AUTO: 90.2 FL (ref 79–97)
PLATELET # BLD AUTO: 302 10*3/MM3 (ref 140–450)
PMV BLD AUTO: 10.1 FL (ref 6–12)
POTASSIUM BLD-SCNC: 5.5 MMOL/L (ref 3.5–5.2)
PROT SERPL-MCNC: 7.1 G/DL (ref 6–8.5)
RBC # BLD AUTO: 4.8 10*6/MM3 (ref 3.77–5.28)
SODIUM BLD-SCNC: 136 MMOL/L (ref 136–145)
WBC NRBC COR # BLD: 9.64 10*3/MM3 (ref 3.4–10.8)

## 2020-03-16 PROCEDURE — 80053 COMPREHEN METABOLIC PANEL: CPT | Performed by: SURGERY

## 2020-03-16 PROCEDURE — 85027 COMPLETE CBC AUTOMATED: CPT | Performed by: SURGERY

## 2020-03-16 PROCEDURE — 36415 COLL VENOUS BLD VENIPUNCTURE: CPT

## 2020-03-16 RX ORDER — SUMATRIPTAN 6 MG/.5ML
6 INJECTION, SOLUTION SUBCUTANEOUS ONCE AS NEEDED
COMMUNITY
End: 2020-07-02 | Stop reason: ALTCHOICE

## 2020-03-16 RX ORDER — CHLORHEXIDINE GLUCONATE 500 MG/1
CLOTH TOPICAL TAKE AS DIRECTED
COMMUNITY
End: 2020-05-21 | Stop reason: HOSPADM

## 2020-03-16 NOTE — DISCHARGE INSTRUCTIONS
Take only the following medications the morning of surgery with a small sip of water: PROPRANOLOL    ARRIVE AT 6AM    Do not take Bariatric Vitamins, Folic Acid, Actigall (if applicable) or Lovenox Injections (if applicable) the morning of surgery.  If you have a history of blood clots or have a BMI greater than 50, Dr. Betancourt may order Lovenox for after surgery. Do not take Lovenox blood thinner before surgery.      General Instructions:   • Drink one 20 ounce Gatorade G2 the evening before surgery.  Nothing red in color.  • Do not eat solid food after midnight the night before surgery.    • The morning of surgery have another 20 ounce Gatorade G2.  Again, nothing red in color.  Your drink must be completed 2 hours before your arrival time.   • Patients who avoid smoking, chewing tobacco and alcohol for 4 weeks prior to surgery have a reduced risk of post-operative complications.  Quit smoking as many days before surgery as you can.  • Do not smoke, use chewing tobacco or drink alcohol the day of surgery.   • Bring any papers given to you in the doctor's office.  Wear clean comfortable clothes.  • Do not wear contact lenses, false eyelashes or make-up.  Bring a case for your glasses.   • Bring crutches or walker if applicable.  • Remove all piercings.  Leave jewelry and any other valuables at home.  • Remove fingernail polish, gel overlays or any artificial nails.  • Hair extensions with metal clips must be removed prior to surgery.  • The Pre-Admission Testing nurse will instruct you to bring medications if unable to obtain an accurate list in Pre-Admission Testing.        Preventing a Surgical Site Infection:  • For 2 to 3 days before surgery, avoid shaving with a razor because the razor can irritate skin and make it easier to develop an infection.    • Any areas of open skin can increase the risk of a post-operative wound infection by allowing bacteria to enter and travel throughout the body.  Notify your  surgeon if you have any skin wounds / rashes even if it is not near the expected surgical site.  The area will need assessed to determine if surgery should be delayed until it is healed.  • 2 days prior to surgery, take a shower using a fresh bar of anti-bacterial soap (such as Dial).  Use a clean washcloth and dry with a clean towel.    • The day prior to surgery, take a shower using a fresh bar of anti-bacterial soap (such as Dial).  Use a clean washcloth and dry with a clean towel.  Sleep in a clean bed with clean clothing.  Do not allow pets to sleep with you.  • The morning of surgery shower using a fresh bar of anti-bacterial soap (such as Dial).  Use a clean washcloth and dry with a clean towel.  Follow the Chlorhexidine instructions below.    CHLORHEXIDINE CLOTH INSTRUCTIONS  The morning of surgery follow these instructions using the Chlorhexidine cloths you've been given.  These steps reduce bacteria on the body.  Do not use the cloths near your eyes, ears mouth, genitalia or on open wounds.  Throw the cloths away after use but do not try to flush them down a toilet.    • Open and remove one cloth at a time from the package.    • Leave the cloth unfolded and begin the bathing.  • Massage the skin with the cloths using gentle pressure to remove bacteria.  Do not scrub harshly.   • Follow the steps below with one 2% CHG cloth per area (6 total cloths).  • One cloth for neck, shoulders and chest.  • One cloth for both arms, hands, fingers and underarms (do underarms last).  • One cloth for the abdomen followed by groin.  • One cloth for right leg and foot including between the toes.  • One cloth for left leg and foot including between the toes.  • The last cloth is to be used for the back of the neck, back and buttocks.    Allow the CHG to air dry 3 minutes on the skin which will give it time to work and decrease the chance of irritation.  The skin may feel sticky until it is dry.  Do not rinse with water or  any other liquid or you will lose the beneficial effects of the CHG.  If mild skin irritation occurs, do rinse the skin to remove the CHG.  Report this to the nurse at time of admission.  Do not apply lotions, creams, ointments, deodorants or perfumes after using the clothes. Dress in clean clothes before coming to the hospital.    • Ask your surgeon if you will be receiving antibiotics prior to surgery.  • Make sure you, your family, and all healthcare providers clean their hands with soap and water or an alcohol based hand  before caring for you or your wound.      Day of surgery:  Your arrival time is approximately two hours before your scheduled surgery time.  Upon arrival, a Pre-op nurse and Anesthesiologist will review your health history, obtain vital signs, and answer questions you may have.  A Pre-op nurse will start an IV and you may receive medication in preparation for surgery, including something to help you relax.  Your family will be able to see you in the Pre-op area.  Two visitors at a time will be allowed in the Pre-op room.  While you are in surgery, your family should notify the waiting room  if they leave the waiting room area and provide a contact phone number.  If you are staying overnight your family can leave your belongings in the car and bring them to your room later.  If applicable, we do ask that you have your C-PAP/BI-PAP machine available. It can be utilized the night of surgery.     Please be aware that surgery does come with discomfort.  We want to make every effort to control your discomfort so please discuss any uncontrolled symptoms with your nurse.   Your doctor will most likely have prescribed pain medications.      If you are going home after surgery you will receive individualized written care instructions before being discharged.  A responsible adult must drive you to and from the hospital on the day of your surgery and stay with you for 24 hours.    If  you are staying overnight following surgery, you will be transported to your hospital room following the recovery period.  [facility] has all private rooms.    If you have any questions please call Pre-Admission Testing at [phone].  Deductibles and co-payments are collected on the day of service. Please be prepared to pay the required co-pay, deductible or deposit on the day of service as defined by your plan.

## 2020-05-18 ENCOUNTER — APPOINTMENT (OUTPATIENT)
Dept: PREADMISSION TESTING | Facility: HOSPITAL | Age: 40
End: 2020-05-18

## 2020-05-18 VITALS
TEMPERATURE: 98.2 F | SYSTOLIC BLOOD PRESSURE: 125 MMHG | RESPIRATION RATE: 18 BRPM | HEIGHT: 66 IN | DIASTOLIC BLOOD PRESSURE: 82 MMHG | HEART RATE: 74 BPM | OXYGEN SATURATION: 97 % | BODY MASS INDEX: 53.59 KG/M2

## 2020-05-18 LAB
ANION GAP SERPL CALCULATED.3IONS-SCNC: 12.1 MMOL/L (ref 5–15)
BUN BLD-MCNC: 21 MG/DL (ref 6–20)
BUN/CREAT SERPL: 21.6 (ref 7–25)
CALCIUM SPEC-SCNC: 9.6 MG/DL (ref 8.6–10.5)
CHLORIDE SERPL-SCNC: 100 MMOL/L (ref 98–107)
CO2 SERPL-SCNC: 24.9 MMOL/L (ref 22–29)
CREAT BLD-MCNC: 0.97 MG/DL (ref 0.57–1)
DEPRECATED RDW RBC AUTO: 40 FL (ref 37–54)
ERYTHROCYTE [DISTWIDTH] IN BLOOD BY AUTOMATED COUNT: 12.5 % (ref 12.3–15.4)
GFR SERPL CREATININE-BSD FRML MDRD: 64 ML/MIN/1.73
GLUCOSE BLD-MCNC: 97 MG/DL (ref 65–99)
HCG SERPL QL: NEGATIVE
HCT VFR BLD AUTO: 40.7 % (ref 34–46.6)
HGB BLD-MCNC: 13.7 G/DL (ref 12–15.9)
MCH RBC QN AUTO: 30 PG (ref 26.6–33)
MCHC RBC AUTO-ENTMCNC: 33.7 G/DL (ref 31.5–35.7)
MCV RBC AUTO: 89.3 FL (ref 79–97)
PLATELET # BLD AUTO: 234 10*3/MM3 (ref 140–450)
PMV BLD AUTO: 10.7 FL (ref 6–12)
POTASSIUM BLD-SCNC: 4 MMOL/L (ref 3.5–5.2)
RBC # BLD AUTO: 4.56 10*6/MM3 (ref 3.77–5.28)
SODIUM BLD-SCNC: 137 MMOL/L (ref 136–145)
WBC NRBC COR # BLD: 7.29 10*3/MM3 (ref 3.4–10.8)

## 2020-05-18 PROCEDURE — 36415 COLL VENOUS BLD VENIPUNCTURE: CPT

## 2020-05-18 PROCEDURE — 85027 COMPLETE CBC AUTOMATED: CPT | Performed by: SURGERY

## 2020-05-18 PROCEDURE — 84703 CHORIONIC GONADOTROPIN ASSAY: CPT | Performed by: SURGERY

## 2020-05-18 PROCEDURE — U0004 COV-19 TEST NON-CDC HGH THRU: HCPCS | Performed by: NURSE PRACTITIONER

## 2020-05-18 PROCEDURE — 80048 BASIC METABOLIC PNL TOTAL CA: CPT | Performed by: SURGERY

## 2020-05-18 RX ORDER — PROPRANOLOL HYDROCHLORIDE 10 MG/1
10 TABLET ORAL 2 TIMES DAILY
COMMUNITY
End: 2020-11-06 | Stop reason: ALTCHOICE

## 2020-05-18 NOTE — DISCHARGE INSTRUCTIONS
Take only the following medications the morning of surgery with a small sip of water:   GABAPENTIN, PROPRANOLOL    Arrive to hospital on your day of surgery at 6:00 AM.      Do not take Bariatric Vitamins, Folic Acid, Actigall (if applicable) or Lovenox Injections (if applicable) the morning of surgery.  If you have a history of blood clots or have a BMI greater than 50, Dr. Betancourt may order Lovenox for after surgery. Do not take Lovenox blood thinner before surgery.      General Instructions:   • Drink one 20 ounce Gatorade G2 the evening before surgery.  Nothing red in color.  • Do not eat solid food after midnight the night before surgery.    • The morning of surgery have another 20 ounce Gatorade G2.  Again, nothing red in color.  Your drink must be completed 2 hours before your arrival time.   • Patients who avoid smoking, chewing tobacco and alcohol for 4 weeks prior to surgery have a reduced risk of post-operative complications.  Quit smoking as many days before surgery as you can.  • Do not smoke, use chewing tobacco or drink alcohol the day of surgery.   • Bring any papers given to you in the doctor's office.  Wear clean comfortable clothes.  • Do not wear contact lenses, false eyelashes or make-up.  Bring a case for your glasses.   • Bring crutches or walker if applicable.  • Remove all piercings.  Leave jewelry and any other valuables at home.  • Remove fingernail polish, gel overlays or any artificial nails.  • Hair extensions with metal clips must be removed prior to surgery.  • The Pre-Admission Testing nurse will instruct you to bring medications if unable to obtain an accurate list in Pre-Admission Testing.    If you were given a blood bank ID arm band remember to bring it with you the day of surgery.    Preventing a Surgical Site Infection:  • For 2 to 3 days before surgery, avoid shaving with a razor because the razor can irritate skin and make it easier to develop an infection.    • Any areas of  open skin can increase the risk of a post-operative wound infection by allowing bacteria to enter and travel throughout the body.  Notify your surgeon if you have any skin wounds / rashes even if it is not near the expected surgical site.  The area will need assessed to determine if surgery should be delayed until it is healed.  • 2 days prior to surgery, take a shower using a fresh bar of anti-bacterial soap (such as Dial).  Use a clean washcloth and dry with a clean towel.    • The day prior to surgery, take a shower using a fresh bar of anti-bacterial soap (such as Dial).  Use a clean washcloth and dry with a clean towel.  Sleep in a clean bed with clean clothing.  Do not allow pets to sleep with you.  • The morning of surgery shower using a fresh bar of anti-bacterial soap (such as Dial).  Use a clean washcloth and dry with a clean towel.  Follow the Chlorhexidine instructions below.    CHLORHEXIDINE CLOTH INSTRUCTIONS  The morning of surgery follow these instructions using the Chlorhexidine cloths you've been given.  These steps reduce bacteria on the body.  Do not use the cloths near your eyes, ears mouth, genitalia or on open wounds.  Throw the cloths away after use but do not try to flush them down a toilet.    • Open and remove one cloth at a time from the package.    • Leave the cloth unfolded and begin the bathing.  • Massage the skin with the cloths using gentle pressure to remove bacteria.  Do not scrub harshly.   • Follow the steps below with one 2% CHG cloth per area (6 total cloths).  • One cloth for neck, shoulders and chest.  • One cloth for both arms, hands, fingers and underarms (do underarms last).  • One cloth for the abdomen followed by groin.  • One cloth for right leg and foot including between the toes.  • One cloth for left leg and foot including between the toes.  • The last cloth is to be used for the back of the neck, back and buttocks.    Allow the CHG to air dry 3 minutes on the skin  which will give it time to work and decrease the chance of irritation.  The skin may feel sticky until it is dry.  Do not rinse with water or any other liquid or you will lose the beneficial effects of the CHG.  If mild skin irritation occurs, do rinse the skin to remove the CHG.  Report this to the nurse at time of admission.  Do not apply lotions, creams, ointments, deodorants or perfumes after using the clothes. Dress in clean clothes before coming to the hospital.    • Ask your surgeon if you will be receiving antibiotics prior to surgery.  • Make sure you, your family, and all healthcare providers clean their hands with soap and water or an alcohol based hand  before caring for you or your wound.      Day of surgery:  Your arrival time is approximately two hours before your scheduled surgery time.  Upon arrival, a Pre-op nurse and Anesthesiologist will review your health history, obtain vital signs, and answer questions you may have.  A Pre-op nurse will start an IV and you may receive medication in preparation for surgery, including something to help you relax.  Your family will be able to see you in the Pre-op area.  Two visitors at a time will be allowed in the Pre-op room.  While you are in surgery, your family should notify the waiting room  if they leave the waiting room area and provide a contact phone number.  If you are staying overnight your family can leave your belongings in the car and bring them to your room later.  If applicable, we do ask that you have your C-PAP/BI-PAP machine available. It can be utilized the night of surgery.     Please be aware that surgery does come with discomfort.  We want to make every effort to control your discomfort so please discuss any uncontrolled symptoms with your nurse.   Your doctor will most likely have prescribed pain medications.      If you are going home after surgery you will receive individualized written care instructions before  being discharged.  A responsible adult must drive you to and from the hospital on the day of your surgery and stay with you for 24 hours.    If you are staying overnight following surgery, you will be transported to your hospital room following the recovery period.  [facility] has all private rooms.    If you have any questions please call Pre-Admission Testing at [phone].  Deductibles and co-payments are collected on the day of service. Please be prepared to pay the required co-pay, deductible or deposit on the day of service as defined by your plan.    Self-Quarantine Prior to Surgery    • Stay at home. Do not leave your home after you have been given the Covid test for your upcoming surgery.   • Wash your hands often with soap and water for at least 20 seconds especially after you have been in a public place, or after blowing your nose, coughing, or sneezing.  • If soap and water are not readily available, use a hand  that contains at least 60% alcohol. Cover all surfaces of your hands and rub them together until they feel dry.  • Avoid touching your eyes, nose, and mouth with unwashed hands.  • Take everyday precautions to keep space between yourself and others (stay 6 feet away, which is about two arm lengths).  Remember that some people without symptoms may be able to spread virus.  • You should stay in a specific room away from others if possible.  Stay at least 6 feet away from others in the home if you cannot have a dedicated room to yourself. Do not have visitors.   • Wear a mask around others in your home if you are unable to stay in a separate room or 6 feet apart. If you are unable to wear a mask, have your family member wear a mask if they must be within 6 feet of you.   • Do not snuggle with your pet. While the CDC says there is no evidence that pets can spread COVID-19 or be infected from humans, it is probably best to avoid “petting, snuggling, being kissed or licked  and sharing food (during self-quarantine)”, according to the CDC.   • Do not share anything - Have your own utensils, drinking glass, dishes, towels and bedding.   • Do not share a bathroom with others, if possible.   • Clean and disinfect frequently touched services daily. This includes tables, doorknobs, light switches, countertops, handles, desks, phones, keyboards, toilets, faucets, and sinks.  • Do not travel prior to surgery.    Call your surgeon immediately if you experience any of the following symptoms:  • Sore Throat      • Shortness of Breath or difficulty breathing  • Cough  • Chills  • Body soreness or muscle pain  • Headache  • Fever  • New loss of taste or smell  • Do not arrive for your surgery ill.  Your procedure will need to be rescheduled to another time.  You will need to call your physician before the day of surgery to avoid any unnecessary exposure to hospital staff as well as other patients.      CHLORHEXIDINE CLOTH INSTRUCTIONS  The morning of surgery follow these instructions using the Chlorhexidine cloths you've been given.  These steps reduce bacteria on the body.  Do not use the cloths near your eyes, ears mouth, genitalia or on open wounds.  Throw the cloths away after use but do not try to flush them down a toilet.      • Open and remove one cloth at a time from the package.    • Leave the cloth unfolded and begin the bathing.  • Massage the skin with the cloths using gentle pressure to remove bacteria.  Do not scrub harshly.   • Follow the steps below with one 2% CHG cloth per area (6 total cloths).  • One cloth for neck, shoulders and chest.  • One cloth for both arms, hands, fingers and underarms (do underarms last).  • One cloth for the abdomen followed by groin.  • One cloth for right leg and foot including between the toes.  • One cloth for left leg and foot including between the toes.  • The last cloth is to be used for the back of the neck, back and buttocks.    Allow the CHG  to air dry 3 minutes on the skin which will give it time to work and decrease the chance of irritation.  The skin may feel sticky until it is dry.  Do not rinse with water or any other liquid or you will lose the beneficial effects of the CHG.  If mild skin irritation occurs, do rinse the skin to remove the CHG.  Report this to the nurse at time of admission.  Do not apply lotions, creams, ointments, deodorants or perfumes after using the clothes. Dress in clean clothes before coming to the hospital.

## 2020-05-19 LAB
REF LAB TEST METHOD: NORMAL
SARS-COV-2 RNA RESP QL NAA+PROBE: NOT DETECTED

## 2020-05-20 ENCOUNTER — ANESTHESIA (OUTPATIENT)
Dept: PERIOP | Facility: HOSPITAL | Age: 40
End: 2020-05-20

## 2020-05-20 ENCOUNTER — HOSPITAL ENCOUNTER (INPATIENT)
Facility: HOSPITAL | Age: 40
LOS: 1 days | Discharge: HOME | End: 2020-05-21
Attending: SURGERY | Admitting: SURGERY

## 2020-05-20 ENCOUNTER — ANESTHESIA EVENT (OUTPATIENT)
Dept: PERIOP | Facility: HOSPITAL | Age: 40
End: 2020-05-20

## 2020-05-20 DIAGNOSIS — F32.A ANXIETY AND DEPRESSION: ICD-10-CM

## 2020-05-20 DIAGNOSIS — Z01.818 PRE-OP EVALUATION: ICD-10-CM

## 2020-05-20 DIAGNOSIS — G43.901 MIGRAINE WITH STATUS MIGRAINOSUS, NOT INTRACTABLE, UNSPECIFIED MIGRAINE TYPE: ICD-10-CM

## 2020-05-20 DIAGNOSIS — R53.82 CHRONIC FATIGUE: ICD-10-CM

## 2020-05-20 DIAGNOSIS — Z98.84 S/P LAPAROSCOPIC SLEEVE GASTRECTOMY: Primary | ICD-10-CM

## 2020-05-20 DIAGNOSIS — E28.2 PCOS (POLYCYSTIC OVARIAN SYNDROME): ICD-10-CM

## 2020-05-20 DIAGNOSIS — E55.9 VITAMIN D DEFICIENCY: ICD-10-CM

## 2020-05-20 DIAGNOSIS — F41.9 ANXIETY AND DEPRESSION: ICD-10-CM

## 2020-05-20 DIAGNOSIS — R06.83 SNORING: ICD-10-CM

## 2020-05-20 DIAGNOSIS — K21.9 GASTROESOPHAGEAL REFLUX DISEASE WITHOUT ESOPHAGITIS: ICD-10-CM

## 2020-05-20 DIAGNOSIS — Z71.3 DIETARY COUNSELING: ICD-10-CM

## 2020-05-20 DIAGNOSIS — I10 ESSENTIAL HYPERTENSION: ICD-10-CM

## 2020-05-20 DIAGNOSIS — E66.01 CLASS 3 SEVERE OBESITY DUE TO EXCESS CALORIES WITH SERIOUS COMORBIDITY AND BODY MASS INDEX (BMI) OF 50.0 TO 59.9 IN ADULT (HCC): ICD-10-CM

## 2020-05-20 DIAGNOSIS — E78.1 HYPERTRIGLYCERIDEMIA: ICD-10-CM

## 2020-05-20 PROCEDURE — 0DB64Z3 EXCISION OF STOMACH, PERCUTANEOUS ENDOSCOPIC APPROACH, VERTICAL: ICD-10-PCS | Performed by: SURGERY

## 2020-05-20 PROCEDURE — 25010000002 KETOROLAC TROMETHAMINE PER 15 MG: Performed by: ANESTHESIOLOGY

## 2020-05-20 PROCEDURE — 25010000002 DIPHENHYDRAMINE PER 50 MG: Performed by: SURGERY

## 2020-05-20 PROCEDURE — 88307 TISSUE EXAM BY PATHOLOGIST: CPT | Performed by: SURGERY

## 2020-05-20 PROCEDURE — 25010000002 ONDANSETRON PER 1 MG: Performed by: ANESTHESIOLOGY

## 2020-05-20 PROCEDURE — 25010000002 ONDANSETRON PER 1 MG: Performed by: SURGERY

## 2020-05-20 PROCEDURE — 25010000002 PROPOFOL 10 MG/ML EMULSION: Performed by: ANESTHESIOLOGY

## 2020-05-20 PROCEDURE — 25010000002 FENTANYL CITRATE (PF) 100 MCG/2ML SOLUTION: Performed by: ANESTHESIOLOGY

## 2020-05-20 PROCEDURE — 25010000002 DEXAMETHASONE PER 1 MG: Performed by: ANESTHESIOLOGY

## 2020-05-20 PROCEDURE — 25010000002 METOCLOPRAMIDE PER 10 MG: Performed by: SURGERY

## 2020-05-20 PROCEDURE — 25010000002 MIDAZOLAM PER 1 MG: Performed by: ANESTHESIOLOGY

## 2020-05-20 PROCEDURE — 25010000002 HYDROMORPHONE PER 4 MG: Performed by: ANESTHESIOLOGY

## 2020-05-20 PROCEDURE — 43775 LAP SLEEVE GASTRECTOMY: CPT | Performed by: SURGERY

## 2020-05-20 PROCEDURE — 25010000002 ENOXAPARIN PER 10 MG: Performed by: SURGERY

## 2020-05-20 PROCEDURE — 25010000002 HYDROMORPHONE PER 4 MG: Performed by: SURGERY

## 2020-05-20 PROCEDURE — 25010000002 PROMETHAZINE PER 50 MG: Performed by: ANESTHESIOLOGY

## 2020-05-20 PROCEDURE — 25010000002 KETOROLAC TROMETHAMINE PER 15 MG: Performed by: SURGERY

## 2020-05-20 DEVICE — ENDOPATH ECHELON ENDOSCOPIC LINEAR CUTTER RELOADS, GREEN, 60MM
Type: IMPLANTABLE DEVICE | Site: STOMACH | Status: FUNCTIONAL
Brand: ECHELON ENDOPATH

## 2020-05-20 DEVICE — STPL/LN REINF PERISTRIP DRY VERITAS THN: Type: IMPLANTABLE DEVICE | Site: STOMACH | Status: FUNCTIONAL

## 2020-05-20 DEVICE — SEALANT WND FIBRIN TISSEEL PREFIL/SYR/PRIMAFZ 4ML: Type: IMPLANTABLE DEVICE | Site: STOMACH | Status: FUNCTIONAL

## 2020-05-20 RX ORDER — NITROGLYCERIN 0.4 MG/1
0.4 TABLET SUBLINGUAL
Status: DISCONTINUED | OUTPATIENT
Start: 2020-05-20 | End: 2020-05-21 | Stop reason: HOSPADM

## 2020-05-20 RX ORDER — KETOROLAC TROMETHAMINE 30 MG/ML
INJECTION, SOLUTION INTRAMUSCULAR; INTRAVENOUS AS NEEDED
Status: DISCONTINUED | OUTPATIENT
Start: 2020-05-20 | End: 2020-05-20 | Stop reason: SURG

## 2020-05-20 RX ORDER — HYDROMORPHONE HYDROCHLORIDE 1 MG/ML
0.5 INJECTION, SOLUTION INTRAMUSCULAR; INTRAVENOUS; SUBCUTANEOUS
Status: COMPLETED | OUTPATIENT
Start: 2020-05-20 | End: 2020-05-20

## 2020-05-20 RX ORDER — PROMETHAZINE HYDROCHLORIDE 25 MG/ML
6.25 INJECTION, SOLUTION INTRAMUSCULAR; INTRAVENOUS ONCE AS NEEDED
Status: COMPLETED | OUTPATIENT
Start: 2020-05-20 | End: 2020-05-20

## 2020-05-20 RX ORDER — FLUMAZENIL 0.1 MG/ML
0.2 INJECTION INTRAVENOUS AS NEEDED
Status: DISCONTINUED | OUTPATIENT
Start: 2020-05-20 | End: 2020-05-20 | Stop reason: HOSPADM

## 2020-05-20 RX ORDER — SODIUM CHLORIDE, SODIUM LACTATE, POTASSIUM CHLORIDE, CALCIUM CHLORIDE 600; 310; 30; 20 MG/100ML; MG/100ML; MG/100ML; MG/100ML
9 INJECTION, SOLUTION INTRAVENOUS CONTINUOUS
Status: DISCONTINUED | OUTPATIENT
Start: 2020-05-20 | End: 2020-05-20 | Stop reason: HOSPADM

## 2020-05-20 RX ORDER — METOCLOPRAMIDE HYDROCHLORIDE 5 MG/ML
10 INJECTION INTRAMUSCULAR; INTRAVENOUS ONCE
Status: COMPLETED | OUTPATIENT
Start: 2020-05-20 | End: 2020-05-20

## 2020-05-20 RX ORDER — PROMETHAZINE HYDROCHLORIDE 25 MG/1
25 TABLET ORAL ONCE AS NEEDED
Status: COMPLETED | OUTPATIENT
Start: 2020-05-20 | End: 2020-05-20

## 2020-05-20 RX ORDER — SODIUM CHLORIDE 0.9 % (FLUSH) 0.9 %
3-10 SYRINGE (ML) INJECTION AS NEEDED
Status: DISCONTINUED | OUTPATIENT
Start: 2020-05-20 | End: 2020-05-20 | Stop reason: HOSPADM

## 2020-05-20 RX ORDER — PANTOPRAZOLE SODIUM 40 MG/10ML
40 INJECTION, POWDER, LYOPHILIZED, FOR SOLUTION INTRAVENOUS ONCE
Status: COMPLETED | OUTPATIENT
Start: 2020-05-20 | End: 2020-05-20

## 2020-05-20 RX ORDER — FENTANYL CITRATE 50 UG/ML
50 INJECTION, SOLUTION INTRAMUSCULAR; INTRAVENOUS
Status: DISCONTINUED | OUTPATIENT
Start: 2020-05-20 | End: 2020-05-20 | Stop reason: HOSPADM

## 2020-05-20 RX ORDER — LIDOCAINE HYDROCHLORIDE 10 MG/ML
0.5 INJECTION, SOLUTION EPIDURAL; INFILTRATION; INTRACAUDAL; PERINEURAL ONCE AS NEEDED
Status: DISCONTINUED | OUTPATIENT
Start: 2020-05-20 | End: 2020-05-20 | Stop reason: HOSPADM

## 2020-05-20 RX ORDER — LORAZEPAM 2 MG/ML
1 INJECTION INTRAMUSCULAR EVERY 12 HOURS PRN
Status: DISCONTINUED | OUTPATIENT
Start: 2020-05-20 | End: 2020-05-21 | Stop reason: HOSPADM

## 2020-05-20 RX ORDER — ALBUTEROL SULFATE 2.5 MG/3ML
2.5 SOLUTION RESPIRATORY (INHALATION)
Status: DISCONTINUED | OUTPATIENT
Start: 2020-05-20 | End: 2020-05-20

## 2020-05-20 RX ORDER — SODIUM CHLORIDE 9 MG/ML
INJECTION, SOLUTION INTRAVENOUS AS NEEDED
Status: DISCONTINUED | OUTPATIENT
Start: 2020-05-20 | End: 2020-05-20 | Stop reason: HOSPADM

## 2020-05-20 RX ORDER — FENTANYL CITRATE 50 UG/ML
INJECTION, SOLUTION INTRAMUSCULAR; INTRAVENOUS AS NEEDED
Status: DISCONTINUED | OUTPATIENT
Start: 2020-05-20 | End: 2020-05-20 | Stop reason: SURG

## 2020-05-20 RX ORDER — SODIUM CHLORIDE 0.9 % (FLUSH) 0.9 %
3 SYRINGE (ML) INJECTION EVERY 12 HOURS SCHEDULED
Status: DISCONTINUED | OUTPATIENT
Start: 2020-05-20 | End: 2020-05-20 | Stop reason: HOSPADM

## 2020-05-20 RX ORDER — NALOXONE HCL 0.4 MG/ML
0.1 VIAL (ML) INJECTION
Status: DISCONTINUED | OUTPATIENT
Start: 2020-05-20 | End: 2020-05-21 | Stop reason: HOSPADM

## 2020-05-20 RX ORDER — ALBUTEROL SULFATE 2.5 MG/3ML
2.5 SOLUTION RESPIRATORY (INHALATION) EVERY 6 HOURS PRN
Status: DISCONTINUED | OUTPATIENT
Start: 2020-05-20 | End: 2020-05-21 | Stop reason: HOSPADM

## 2020-05-20 RX ORDER — PROMETHAZINE HYDROCHLORIDE 25 MG/ML
12.5 INJECTION, SOLUTION INTRAMUSCULAR; INTRAVENOUS EVERY 4 HOURS PRN
Status: DISCONTINUED | OUTPATIENT
Start: 2020-05-20 | End: 2020-05-21 | Stop reason: HOSPADM

## 2020-05-20 RX ORDER — SCOLOPAMINE TRANSDERMAL SYSTEM 1 MG/1
1 PATCH, EXTENDED RELEASE TRANSDERMAL CONTINUOUS
Status: DISCONTINUED | OUTPATIENT
Start: 2020-05-20 | End: 2020-05-21 | Stop reason: HOSPADM

## 2020-05-20 RX ORDER — LISINOPRIL 10 MG/1
10 TABLET ORAL DAILY
Status: DISCONTINUED | OUTPATIENT
Start: 2020-05-20 | End: 2020-05-21 | Stop reason: HOSPADM

## 2020-05-20 RX ORDER — METOCLOPRAMIDE HYDROCHLORIDE 5 MG/ML
10 INJECTION INTRAMUSCULAR; INTRAVENOUS EVERY 6 HOURS
Status: DISCONTINUED | OUTPATIENT
Start: 2020-05-20 | End: 2020-05-21 | Stop reason: HOSPADM

## 2020-05-20 RX ORDER — MAGNESIUM HYDROXIDE 1200 MG/15ML
LIQUID ORAL AS NEEDED
Status: DISCONTINUED | OUTPATIENT
Start: 2020-05-20 | End: 2020-05-20 | Stop reason: HOSPADM

## 2020-05-20 RX ORDER — ONDANSETRON 2 MG/ML
INJECTION INTRAMUSCULAR; INTRAVENOUS AS NEEDED
Status: DISCONTINUED | OUTPATIENT
Start: 2020-05-20 | End: 2020-05-20 | Stop reason: SURG

## 2020-05-20 RX ORDER — LABETALOL HYDROCHLORIDE 5 MG/ML
10 INJECTION, SOLUTION INTRAVENOUS
Status: DISCONTINUED | OUTPATIENT
Start: 2020-05-20 | End: 2020-05-21 | Stop reason: HOSPADM

## 2020-05-20 RX ORDER — MORPHINE SULFATE 2 MG/ML
2 INJECTION, SOLUTION INTRAMUSCULAR; INTRAVENOUS
Status: DISCONTINUED | OUTPATIENT
Start: 2020-05-20 | End: 2020-05-21 | Stop reason: HOSPADM

## 2020-05-20 RX ORDER — SODIUM CHLORIDE, SODIUM LACTATE, POTASSIUM CHLORIDE, CALCIUM CHLORIDE 600; 310; 30; 20 MG/100ML; MG/100ML; MG/100ML; MG/100ML
100 INJECTION, SOLUTION INTRAVENOUS CONTINUOUS
Status: DISCONTINUED | OUTPATIENT
Start: 2020-05-20 | End: 2020-05-20 | Stop reason: HOSPADM

## 2020-05-20 RX ORDER — OXYCODONE AND ACETAMINOPHEN 7.5; 325 MG/1; MG/1
1 TABLET ORAL ONCE AS NEEDED
Status: DISCONTINUED | OUTPATIENT
Start: 2020-05-20 | End: 2020-05-20 | Stop reason: HOSPADM

## 2020-05-20 RX ORDER — FENTANYL CITRATE 50 UG/ML
100 INJECTION, SOLUTION INTRAMUSCULAR; INTRAVENOUS
Status: DISCONTINUED | OUTPATIENT
Start: 2020-05-20 | End: 2020-05-20 | Stop reason: HOSPADM

## 2020-05-20 RX ORDER — EPHEDRINE SULFATE 50 MG/ML
5 INJECTION, SOLUTION INTRAVENOUS ONCE AS NEEDED
Status: DISCONTINUED | OUTPATIENT
Start: 2020-05-20 | End: 2020-05-20 | Stop reason: HOSPADM

## 2020-05-20 RX ORDER — NALOXONE HCL 0.4 MG/ML
0.4 VIAL (ML) INJECTION
Status: DISCONTINUED | OUTPATIENT
Start: 2020-05-20 | End: 2020-05-21 | Stop reason: HOSPADM

## 2020-05-20 RX ORDER — ACETAMINOPHEN 500 MG
1000 TABLET ORAL EVERY 6 HOURS
Status: DISCONTINUED | OUTPATIENT
Start: 2020-05-20 | End: 2020-05-21 | Stop reason: HOSPADM

## 2020-05-20 RX ORDER — CEFAZOLIN SODIUM IN 0.9 % NACL 3 G/100 ML
3 INTRAVENOUS SOLUTION, PIGGYBACK (ML) INTRAVENOUS
Status: COMPLETED | OUTPATIENT
Start: 2020-05-20 | End: 2020-05-20

## 2020-05-20 RX ORDER — DEXAMETHASONE SODIUM PHOSPHATE 10 MG/ML
INJECTION INTRAMUSCULAR; INTRAVENOUS AS NEEDED
Status: DISCONTINUED | OUTPATIENT
Start: 2020-05-20 | End: 2020-05-20 | Stop reason: SURG

## 2020-05-20 RX ORDER — HYDROMORPHONE HYDROCHLORIDE 2 MG/1
2 TABLET ORAL EVERY 4 HOURS PRN
Status: DISCONTINUED | OUTPATIENT
Start: 2020-05-20 | End: 2020-05-21 | Stop reason: HOSPADM

## 2020-05-20 RX ORDER — ONDANSETRON 2 MG/ML
4 INJECTION INTRAMUSCULAR; INTRAVENOUS EVERY 4 HOURS PRN
Status: DISCONTINUED | OUTPATIENT
Start: 2020-05-20 | End: 2020-05-21 | Stop reason: HOSPADM

## 2020-05-20 RX ORDER — PROMETHAZINE HYDROCHLORIDE 25 MG/1
25 SUPPOSITORY RECTAL ONCE AS NEEDED
Status: COMPLETED | OUTPATIENT
Start: 2020-05-20 | End: 2020-05-20

## 2020-05-20 RX ORDER — FAMOTIDINE 10 MG/ML
20 INJECTION, SOLUTION INTRAVENOUS EVERY 12 HOURS SCHEDULED
Status: DISCONTINUED | OUTPATIENT
Start: 2020-05-20 | End: 2020-05-21 | Stop reason: HOSPADM

## 2020-05-20 RX ORDER — ONDANSETRON 4 MG/1
4 TABLET, ORALLY DISINTEGRATING ORAL EVERY 4 HOURS PRN
Status: DISCONTINUED | OUTPATIENT
Start: 2020-05-20 | End: 2020-05-21 | Stop reason: HOSPADM

## 2020-05-20 RX ORDER — LIDOCAINE HYDROCHLORIDE 20 MG/ML
INJECTION, SOLUTION INFILTRATION; PERINEURAL AS NEEDED
Status: DISCONTINUED | OUTPATIENT
Start: 2020-05-20 | End: 2020-05-20 | Stop reason: SURG

## 2020-05-20 RX ORDER — KETOROLAC TROMETHAMINE 30 MG/ML
30 INJECTION, SOLUTION INTRAMUSCULAR; INTRAVENOUS 4 TIMES DAILY
Status: COMPLETED | OUTPATIENT
Start: 2020-05-20 | End: 2020-05-21

## 2020-05-20 RX ORDER — GABAPENTIN 300 MG/1
300 CAPSULE ORAL EVERY 8 HOURS SCHEDULED
Status: DISCONTINUED | OUTPATIENT
Start: 2020-05-20 | End: 2020-05-21 | Stop reason: HOSPADM

## 2020-05-20 RX ORDER — PROPOFOL 10 MG/ML
VIAL (ML) INTRAVENOUS AS NEEDED
Status: DISCONTINUED | OUTPATIENT
Start: 2020-05-20 | End: 2020-05-20 | Stop reason: SURG

## 2020-05-20 RX ORDER — MIRTAZAPINE 15 MG/1
15 TABLET, ORALLY DISINTEGRATING ORAL NIGHTLY
Status: DISCONTINUED | OUTPATIENT
Start: 2020-05-20 | End: 2020-05-21 | Stop reason: HOSPADM

## 2020-05-20 RX ORDER — ROCURONIUM BROMIDE 10 MG/ML
INJECTION, SOLUTION INTRAVENOUS AS NEEDED
Status: DISCONTINUED | OUTPATIENT
Start: 2020-05-20 | End: 2020-05-20 | Stop reason: SURG

## 2020-05-20 RX ORDER — HALOPERIDOL 5 MG/ML
0.5 INJECTION INTRAMUSCULAR EVERY 4 HOURS PRN
Status: DISCONTINUED | OUTPATIENT
Start: 2020-05-20 | End: 2020-05-21 | Stop reason: HOSPADM

## 2020-05-20 RX ORDER — BUPIVACAINE HYDROCHLORIDE AND EPINEPHRINE 5; 5 MG/ML; UG/ML
INJECTION, SOLUTION PERINEURAL AS NEEDED
Status: DISCONTINUED | OUTPATIENT
Start: 2020-05-20 | End: 2020-05-20 | Stop reason: HOSPADM

## 2020-05-20 RX ORDER — ONDANSETRON 4 MG/1
4 TABLET, FILM COATED ORAL EVERY 4 HOURS PRN
Status: DISCONTINUED | OUTPATIENT
Start: 2020-05-20 | End: 2020-05-21 | Stop reason: HOSPADM

## 2020-05-20 RX ORDER — ONDANSETRON 2 MG/ML
4 INJECTION INTRAMUSCULAR; INTRAVENOUS ONCE AS NEEDED
Status: COMPLETED | OUTPATIENT
Start: 2020-05-20 | End: 2020-05-20

## 2020-05-20 RX ORDER — HYDROCODONE BITARTRATE AND ACETAMINOPHEN 7.5; 325 MG/1; MG/1
1 TABLET ORAL ONCE AS NEEDED
Status: DISCONTINUED | OUTPATIENT
Start: 2020-05-20 | End: 2020-05-20 | Stop reason: HOSPADM

## 2020-05-20 RX ORDER — SODIUM CHLORIDE, SODIUM LACTATE, POTASSIUM CHLORIDE, CALCIUM CHLORIDE 600; 310; 30; 20 MG/100ML; MG/100ML; MG/100ML; MG/100ML
150 INJECTION, SOLUTION INTRAVENOUS CONTINUOUS
Status: DISCONTINUED | OUTPATIENT
Start: 2020-05-20 | End: 2020-05-21 | Stop reason: HOSPADM

## 2020-05-20 RX ORDER — ACETAMINOPHEN 160 MG/5ML
975 SOLUTION ORAL ONCE
Status: COMPLETED | OUTPATIENT
Start: 2020-05-20 | End: 2020-05-20

## 2020-05-20 RX ORDER — CHLORHEXIDINE GLUCONATE 0.12 MG/ML
15 RINSE ORAL SEE ADMIN INSTRUCTIONS
Status: COMPLETED | OUTPATIENT
Start: 2020-05-20 | End: 2020-05-20

## 2020-05-20 RX ORDER — HYDROMORPHONE HYDROCHLORIDE 1 MG/ML
0.5 INJECTION, SOLUTION INTRAMUSCULAR; INTRAVENOUS; SUBCUTANEOUS
Status: DISCONTINUED | OUTPATIENT
Start: 2020-05-20 | End: 2020-05-21 | Stop reason: HOSPADM

## 2020-05-20 RX ORDER — DIPHENHYDRAMINE HYDROCHLORIDE 50 MG/ML
25 INJECTION INTRAMUSCULAR; INTRAVENOUS EVERY 4 HOURS PRN
Status: DISCONTINUED | OUTPATIENT
Start: 2020-05-20 | End: 2020-05-21 | Stop reason: HOSPADM

## 2020-05-20 RX ORDER — MIDAZOLAM HYDROCHLORIDE 1 MG/ML
2 INJECTION INTRAMUSCULAR; INTRAVENOUS
Status: DISCONTINUED | OUTPATIENT
Start: 2020-05-20 | End: 2020-05-20 | Stop reason: HOSPADM

## 2020-05-20 RX ORDER — HYDRALAZINE HYDROCHLORIDE 20 MG/ML
5 INJECTION INTRAMUSCULAR; INTRAVENOUS
Status: DISCONTINUED | OUTPATIENT
Start: 2020-05-20 | End: 2020-05-20 | Stop reason: HOSPADM

## 2020-05-20 RX ORDER — LORAZEPAM 1 MG/1
1 TABLET ORAL EVERY 12 HOURS PRN
Status: DISCONTINUED | OUTPATIENT
Start: 2020-05-20 | End: 2020-05-21 | Stop reason: HOSPADM

## 2020-05-20 RX ORDER — CYANOCOBALAMIN 1000 UG/ML
1000 INJECTION, SOLUTION INTRAMUSCULAR; SUBCUTANEOUS ONCE
Status: COMPLETED | OUTPATIENT
Start: 2020-05-21 | End: 2020-05-21

## 2020-05-20 RX ORDER — PROPRANOLOL HYDROCHLORIDE 10 MG/1
10 TABLET ORAL EVERY 12 HOURS SCHEDULED
Status: DISCONTINUED | OUTPATIENT
Start: 2020-05-20 | End: 2020-05-21 | Stop reason: HOSPADM

## 2020-05-20 RX ORDER — MIDAZOLAM HYDROCHLORIDE 1 MG/ML
1 INJECTION INTRAMUSCULAR; INTRAVENOUS
Status: DISCONTINUED | OUTPATIENT
Start: 2020-05-20 | End: 2020-05-20 | Stop reason: HOSPADM

## 2020-05-20 RX ADMIN — THIAMINE HYDROCHLORIDE 250 ML/HR: 100 INJECTION, SOLUTION INTRAMUSCULAR; INTRAVENOUS at 22:56

## 2020-05-20 RX ADMIN — HYDROMORPHONE HYDROCHLORIDE 0.5 MG: 1 INJECTION, SOLUTION INTRAMUSCULAR; INTRAVENOUS; SUBCUTANEOUS at 11:58

## 2020-05-20 RX ADMIN — DEXAMETHASONE SODIUM PHOSPHATE 6 MG: 10 INJECTION INTRAMUSCULAR; INTRAVENOUS at 08:08

## 2020-05-20 RX ADMIN — HYDROMORPHONE HYDROCHLORIDE 0.5 MG: 1 INJECTION, SOLUTION INTRAMUSCULAR; INTRAVENOUS; SUBCUTANEOUS at 08:49

## 2020-05-20 RX ADMIN — PANTOPRAZOLE SODIUM 40 MG: 40 INJECTION, POWDER, FOR SOLUTION INTRAVENOUS at 07:15

## 2020-05-20 RX ADMIN — SODIUM CHLORIDE, POTASSIUM CHLORIDE, SODIUM LACTATE AND CALCIUM CHLORIDE 500 ML: 600; 310; 30; 20 INJECTION, SOLUTION INTRAVENOUS at 07:10

## 2020-05-20 RX ADMIN — SCOPALAMINE 1 PATCH: 1 PATCH, EXTENDED RELEASE TRANSDERMAL at 06:56

## 2020-05-20 RX ADMIN — ROCURONIUM BROMIDE 30 MG: 10 INJECTION, SOLUTION INTRAVENOUS at 07:45

## 2020-05-20 RX ADMIN — ACETAMINOPHEN ORAL SOLUTION 975 MG: 325 SOLUTION ORAL at 06:55

## 2020-05-20 RX ADMIN — ONDANSETRON 4 MG: 2 INJECTION INTRAMUSCULAR; INTRAVENOUS at 17:38

## 2020-05-20 RX ADMIN — ENOXAPARIN SODIUM 40 MG: 40 INJECTION SUBCUTANEOUS at 07:31

## 2020-05-20 RX ADMIN — HYDROMORPHONE HYDROCHLORIDE 0.5 MG: 1 INJECTION, SOLUTION INTRAMUSCULAR; INTRAVENOUS; SUBCUTANEOUS at 09:23

## 2020-05-20 RX ADMIN — METOCLOPRAMIDE 10 MG: 5 INJECTION, SOLUTION INTRAMUSCULAR; INTRAVENOUS at 07:16

## 2020-05-20 RX ADMIN — ACETAMINOPHEN 1000 MG: 500 TABLET, FILM COATED ORAL at 17:32

## 2020-05-20 RX ADMIN — PROPOFOL 200 MG: 10 INJECTION, EMULSION INTRAVENOUS at 07:45

## 2020-05-20 RX ADMIN — HYDROMORPHONE HYDROCHLORIDE 0.5 MG: 1 INJECTION, SOLUTION INTRAMUSCULAR; INTRAVENOUS; SUBCUTANEOUS at 11:16

## 2020-05-20 RX ADMIN — CHLORHEXIDINE GLUCONATE 15 ML: 1.2 RINSE ORAL at 06:55

## 2020-05-20 RX ADMIN — ONDANSETRON 4 MG: 2 INJECTION INTRAMUSCULAR; INTRAVENOUS at 13:21

## 2020-05-20 RX ADMIN — FENTANYL CITRATE 50 MCG: 50 INJECTION INTRAMUSCULAR; INTRAVENOUS at 08:15

## 2020-05-20 RX ADMIN — GABAPENTIN 300 MG: 300 CAPSULE ORAL at 22:07

## 2020-05-20 RX ADMIN — ONDANSETRON HYDROCHLORIDE 4 MG: 2 SOLUTION INTRAMUSCULAR; INTRAVENOUS at 08:29

## 2020-05-20 RX ADMIN — FENTANYL CITRATE 50 MCG: 50 INJECTION INTRAMUSCULAR; INTRAVENOUS at 07:45

## 2020-05-20 RX ADMIN — FAMOTIDINE 20 MG: 10 INJECTION, SOLUTION INTRAVENOUS at 20:17

## 2020-05-20 RX ADMIN — FENTANYL CITRATE 50 MCG: 50 INJECTION, SOLUTION INTRAMUSCULAR; INTRAVENOUS at 13:09

## 2020-05-20 RX ADMIN — MIDAZOLAM 2 MG: 1 INJECTION INTRAMUSCULAR; INTRAVENOUS at 07:15

## 2020-05-20 RX ADMIN — SUGAMMADEX 200 MG: 100 INJECTION, SOLUTION INTRAVENOUS at 08:30

## 2020-05-20 RX ADMIN — GABAPENTIN 300 MG: 300 CAPSULE ORAL at 15:11

## 2020-05-20 RX ADMIN — ACETAMINOPHEN 1000 MG: 500 TABLET, FILM COATED ORAL at 23:50

## 2020-05-20 RX ADMIN — FENTANYL CITRATE 50 MCG: 50 INJECTION INTRAMUSCULAR; INTRAVENOUS at 08:00

## 2020-05-20 RX ADMIN — FENTANYL CITRATE 50 MCG: 50 INJECTION INTRAMUSCULAR; INTRAVENOUS at 08:30

## 2020-05-20 RX ADMIN — KETOROLAC TROMETHAMINE 30 MG: 30 INJECTION, SOLUTION INTRAMUSCULAR at 13:59

## 2020-05-20 RX ADMIN — KETOROLAC TROMETHAMINE 30 MG: 30 INJECTION, SOLUTION INTRAMUSCULAR; INTRAVENOUS at 08:32

## 2020-05-20 RX ADMIN — METOCLOPRAMIDE HYDROCHLORIDE 10 MG: 5 INJECTION INTRAMUSCULAR; INTRAVENOUS at 20:18

## 2020-05-20 RX ADMIN — METOCLOPRAMIDE HYDROCHLORIDE 10 MG: 5 INJECTION INTRAMUSCULAR; INTRAVENOUS at 13:44

## 2020-05-20 RX ADMIN — DIPHENHYDRAMINE HYDROCHLORIDE 25 MG: 50 INJECTION, SOLUTION INTRAMUSCULAR; INTRAVENOUS at 20:18

## 2020-05-20 RX ADMIN — HYDROMORPHONE HYDROCHLORIDE 0.5 MG: 1 INJECTION, SOLUTION INTRAMUSCULAR; INTRAVENOUS; SUBCUTANEOUS at 15:57

## 2020-05-20 RX ADMIN — PROMETHAZINE HYDROCHLORIDE 6.25 MG: 25 INJECTION INTRAMUSCULAR; INTRAVENOUS at 09:02

## 2020-05-20 RX ADMIN — CEFAZOLIN 3 G: 1 INJECTION, POWDER, FOR SOLUTION INTRAMUSCULAR; INTRAVENOUS; PARENTERAL at 07:42

## 2020-05-20 RX ADMIN — KETOROLAC TROMETHAMINE 30 MG: 30 INJECTION, SOLUTION INTRAMUSCULAR at 22:10

## 2020-05-20 RX ADMIN — MIRTAZAPINE 15 MG: 15 TABLET, ORALLY DISINTEGRATING ORAL at 20:17

## 2020-05-20 RX ADMIN — FENTANYL CITRATE 50 MCG: 50 INJECTION, SOLUTION INTRAMUSCULAR; INTRAVENOUS at 14:22

## 2020-05-20 RX ADMIN — SODIUM CHLORIDE, POTASSIUM CHLORIDE, SODIUM LACTATE AND CALCIUM CHLORIDE 150 ML/HR: 600; 310; 30; 20 INJECTION, SOLUTION INTRAVENOUS at 22:56

## 2020-05-20 RX ADMIN — LISINOPRIL 10 MG: 10 TABLET ORAL at 17:32

## 2020-05-20 RX ADMIN — KETOROLAC TROMETHAMINE 30 MG: 30 INJECTION, SOLUTION INTRAMUSCULAR at 17:32

## 2020-05-20 RX ADMIN — LIDOCAINE HYDROCHLORIDE 60 MG: 20 INJECTION, SOLUTION INFILTRATION; PERINEURAL at 07:45

## 2020-05-20 RX ADMIN — HYDROMORPHONE HYDROCHLORIDE 0.5 MG: 1 INJECTION, SOLUTION INTRAMUSCULAR; INTRAVENOUS; SUBCUTANEOUS at 20:16

## 2020-05-20 NOTE — PLAN OF CARE
Problem: Patient Care Overview  Goal: Plan of Care Review  Outcome: Ongoing (interventions implemented as appropriate)     Gastric sleeve today. Nausea/pain control. Ambulating well. VSS, will continue to monitor.

## 2020-05-20 NOTE — ANESTHESIA POSTPROCEDURE EVALUATION
Patient: Lizette Connolly    Procedure Summary     Date:  05/20/20 Room / Location:   [facility]  [facility]    Anesthesia Start:  0742 Anesthesia Stop:  0845    Procedure:  GASTRIC SLEEVE LAPAROSCOPIC (N/A Abdomen) Diagnosis:       Class 3 severe obesity due to excess calories with serious comorbidity and body mass index (BMI) of 50.0 to 59.9 in adult (CMS/[facility])      (Class 3 severe obesity due to excess calories with serious comorbidity and body mass index (BMI) of 50.0 to 59.9 in adult (CMS/[facility]) [E66.01, Z68.43])    Surgeon:  Shady Betancourt Jr., MD Provider:  Max Hickey MD    Anesthesia Type:  general ASA Status:  3          Anesthesia Type: general    Vitals  Vitals Value Taken Time   /93 5/20/2020 12:30 PM   Temp 37.1 °C (98.8 °F) 5/20/2020 11:00 AM   Pulse 55 5/20/2020 12:58 PM   Resp 16 5/20/2020 12:30 PM   SpO2 99 % 5/20/2020  1:02 PM   Vitals shown include unvalidated device data.        Post Anesthesia Care and Evaluation    Patient location during evaluation: PACU  Anesthetic complications: No anesthetic complications

## 2020-05-20 NOTE — OP NOTE
PREOPERATIVE DIAGNOSIS:  Morbid obesity with multiple comorbidities as referenced in the most recent history and physical.    POSTOPERATIVE DIAGNOSIS:  Morbid obesity with multiple comorbidities as referenced in the most recent history and physical.    PROCEDURES PERFORMED:  1.  Laparoscopic sleeve gastrectomy.  2.  Tisseel application.     SURGEON:  Shady Betancourt Jr., MD    ASSISTANT: Sandra Mathias MD      Surgery assisted and facilitated by a certified physician assistant, who directly resulted in a decreased operative time, anesthetic time, wound exposure, and possibly of an operative wound infection, thereby decreasing patient morbidity and ultimately total expenditures.  The surgical assistant assisted in placement of trochars, take down of the gastrocolic omentum, short gastric vessels and dissection at the angle of His.  Also assisted in retraction of the stomach during stapling so as not to kink the gastric sleeve.  Also assisted in removing of the gastric specimen, closure of the fascial defect as well as closure of the skin incisions.    ANESTHESIA:  General endotracheal.    ESTIMATED BLOOD LOSS:   Less than 25 mL unless dictated below.    FLUIDS:  Crystalloids.    SPECIMENS:  Gastric remnant    DRAINS:  None.    COUNTS:  Correct.    COMPLICATIONS:  None.    INDICATIONS:  This patient with morbid obesity and associated comorbidities presents for elective laparoscopic, possible open sleeve gastrectomy.  The patient has received medical clearance to proceed.  The patient has undergone our extensive educational process and consent process and wishes to proceed.    DESCRIPTION OF PROCEDURE:  The patient was brought to the operating room and placed supine upon the operating room table. SCD hose were placed.  The patient underwent uneventful general endotracheal anesthesia per the anesthesiology staff. The abdomen was prepped with ChloraPrep and draped in the usual sterile fashion.  An Ioban was used as well if  not allergic.  Anesthesia staff then passed a 18-Grenadian gastric tube into the stomach to decompress.  A 5-10 mm transverse incision was made a few centimeters above and to the left of the umbilicus and the peritoneal cavity entered under direct camera visualization using a 5 or 10 mm 0° laparoscope and an Optiview trocar.  The abdomen was then insufflated to a pressure of 15-16 mmHg with CO2 gas.  Exploratory laparoscopy revealed no evidence of injury from the entrance technique and no significant abnormalities unless addendum dictated below.  An angled laparoscope was then used.  The patient was placed in reverse Trendelenburg position.  Under direct camera visualization a 5 mm trocar was placed in the right lateral subcostal position.  A 12 mm trocar was placed in the right midabdominal position.  A 5-12 mm trocar was placed in the left midabdominal position. A Mahsa retractor was placed through an epigastric incision and used to elevate the left lobe of the liver.  The fat pad was elevated and the left raphael exposed.  At this point, approximately FPC along the greater curvature, the gastrocolic omentum was divided with the Enseal and this proceeded superiorly to the angle of His taking down the short gastric vessels.  All posterior attachments of the lesser sac and posterior aspect of the stomach to the pancreas were taken down as well.  The left raphael was exposed along its length.  Dissection then proceeded medially taking down the greater curvature with an Enseal until just proximal to the pylorus. The standard clamp and 18 Grenadian orogastric tube were used to size the gastric sleeve. The stomach was marked in the 3 positions using the indelible ink pen.  The 3 markings were at the angle of Hiss, the incisura and antrum using 1cm, 3cm and 5cm respectively. Green cartridges were used for a total of 4-5. The 1st load was a green load on the [place] Flex stapler with Veritas Deena-Strip and this was placed 5  cm proximal to the pylorus.  The next 3-4loads were green with absorbable Veritas Deena-Strips depending on the size of the stomach. Careful attention was made to stay 1 cm from the esophagus. Areas of the reinforced staple line were oversewn with absorbable sutures as needed for bleeding or questionable staple lines.  At this point, the gastrectomy specimen was withdrawn through the 12 mm trocar site incision. The specimen staple line was inspected and was intact.  The specimen was then sent off to pathology.  At this point, the sleeve was submerged under saline and using the orogastric tube to insufflate the stomach, a leak test was performed.  This revealed the sleeve to be watertight, no air bubbles, no leak, and no bleeding seen from the staple lines and no significant abnormalities.  Irrigation fluid from the abdomen was then suctioned.  The gastric sleeve staple line was then treated with 4 mL Tisseel fibrin glue. The fascia at the 12 mm trocar site incision was closed with a single 0 Vicryl suture in a figure-of-eight fashion placed under direct laparoscopic camera visualization with a suture passer and tying the knot extracorporeally.  The fascia in the area was infiltrated with local anesthesia. All incisions were then infiltrated with local anesthetic. The remaining trocars were removed under direct camera visualization with no bleeding noted from their sites.  The abdomen was desufflated of gas. The skin in each incision was closed using 4-0 antibiotic impregnated Monocryl in a subcuticular fashion followed by Dermabond.  The patient tolerated the procedure well without complication and was taken to the recovery room in stable condition.  All sponge, needle and instrument counts were correct.     The hiatus was checked for a hernia and no hernia was detected.

## 2020-05-20 NOTE — ANESTHESIA PREPROCEDURE EVALUATION
Anesthesia Evaluation     Patient summary reviewed and Nursing notes reviewed   history of anesthetic complications: PONV  NPO Solid Status: > 8 hours             Airway   Mallampati: II  TM distance: >3 FB  Neck ROM: full  no difficulty expected  Dental - normal exam     Pulmonary - normal exam   (+) sleep apnea,   Cardiovascular - normal exam    (+) hypertension,       Neuro/Psych- negative ROS  GI/Hepatic/Renal/Endo    (+) morbid obesity,      Musculoskeletal (-) negative ROS    Abdominal  - normal exam   Substance History - negative use     OB/GYN negative ob/gyn ROS         Other                        Anesthesia Plan    ASA 3     general     intravenous induction     Anesthetic plan, all risks, benefits, and alternatives have been provided, discussed and informed consent has been obtained with: patient.    Plan discussed with CRNA.

## 2020-05-20 NOTE — H&P
Patient Care Team:  Trena Ferro APRN as PCP - General (Nurse Practitioner)    Chief complaint morbid obesity with multiple comorbidities    Subjective     Patient is a 39 y.o. female who is a patient of ours and has undergone our extensive initial evaluation and consult education for bariatric surgery and presents for surgery.  Please see the consult history and physical for further detailed information.      Review of Systems   Pertinent items are noted in HPI and no changes since last visit.    Past Medical History:   Diagnosis Date   • Anxiety and depression    • GERD (gastroesophageal reflux disease)    • Hip pain 01/07/2019    RIGHT   • HPV in female    • Hypertension    • Kidney stones 01/07/2019   • Migraine    • PCOS (polycystic ovarian syndrome)    • PONV (postoperative nausea and vomiting)    • Sleep apnea     DX IN MARCH, HAS NOT GOTTEN CPAP YET     Past Surgical History:   Procedure Laterality Date   • ENDOSCOPY  2010   • ENDOSCOPY N/A 3/3/2020    Procedure: ESOPHAGOGASTRODUODENOSCOPY WITH BIOPSY;  Surgeon: Shady Betancourt Jr., MD;  Location: [facility];  Service: General;  Laterality: N/A;  PRE- GERD  POST- GASTRITIS   • LAPAROSCOPIC CHOLECYSTECTOMY  2010   • MANDIBLE SURGERY     • ORIF ANKLE FRACTURE Right      Family History   Problem Relation Age of Onset   • Breast cancer Maternal Grandmother    • Hypertension Maternal Grandmother    • Cancer Maternal Grandmother         BREAST & BONE   • Obesity Mother    • Diabetes Mother    • Hypertension Mother    • Heart attack Mother    • Heart disease Mother    • Sleep apnea Mother    • Hypertension Father    • Heart disease Brother    • Hypertension Maternal Grandfather    • Heart disease Maternal Grandfather    • Cancer Maternal Grandfather    • Hypertension Paternal Grandmother    • Multiple sclerosis Paternal Grandmother    • Hypertension Paternal Grandfather    • Cancer Paternal Grandfather         LUNG   • Malig Hyperthermia Neg Hx       Social History     Tobacco Use   • Smoking status: Never Smoker   • Smokeless tobacco: Never Used   Substance Use Topics   • Alcohol use: Yes     Frequency: Never     Comment: OCCASSIONALLY   • Drug use: No     Medications Prior to Admission   Medication Sig Dispense Refill Last Dose   • ascorbic acid (VITAMIN C) 100 MG tablet Take 100 mg by mouth Daily.   Taking   • B Complex Vitamins (VITAMIN B COMPLEX) capsule capsule Take 1 capsule by mouth Daily.   Taking   • Chlorhexidine Gluconate Cloth 2 % pads Apply  topically Take As Directed.      • cholecalciferol (VITAMIN D3) 1000 units tablet Take 1,000 Units by mouth Daily.   Taking   • Erenumab-aooe 70 MG/ML solution auto-injector Inject 140 mg under the skin into the appropriate area as directed Every 30 (Thirty) Days.   Taking   • FLUoxetine HCl (PROZAC PO) Take 60 mg by mouth Daily.   Taking   • folic acid-vit B6-vit B12 (FOLBEE) 2.5-25-1 MG tablet tablet Take 1 tablet by mouth Daily. 40 tablet 0    • gabapentin (NEURONTIN) 100 MG capsule Take 100 mg by mouth 3 (Three) Times a Day.   Taking   • hydrOXYzine (ATARAX) 25 MG tablet Take 25 mg by mouth As Needed.   Taking   • Insulin Pen Needle (PEN NEEDLES) 31G X 6 MM misc    Taking   • lansoprazole (PREVACID) 30 MG capsule Take 30 mg by mouth Daily.   Taking   • levonorgestrel (MIRENA, 52 MG,) 20 MCG/24HR IUD 1 each by Intrauterine route 1 (One) Time.   Taking   • lisinopril (PRINIVIL,ZESTRIL) 10 MG tablet Take 10 mg by mouth Daily.   Taking   • Magnesium 100 MG capsule Take 100 mg by mouth Daily.   Taking   • Multiple Vitamin (MULTI VITAMIN DAILY PO) Take 1 tablet by mouth Daily.   Taking   • Omega-3 Fatty Acids (FISH OIL) 1000 MG capsule capsule Take 1,000 mg by mouth Daily With Breakfast. HELD FOR SURGERY   Taking   • promethazine (PHENERGAN) 12.5 MG suppository Insert 12.5 mg into the rectum Every 6 (Six) Hours As Needed for Nausea or Vomiting.   Taking   • propranolol (INDERAL) 10 MG tablet Take 10 mg by  mouth 2 (Two) Times a Day.      • SUMAtriptan (IMITREX) 100 MG tablet Take 100 mg by mouth Every 2 (Two) Hours As Needed for Migraine. Take one tablet at onset of headache. May repeat dose one time in 2 hours if headache not relieved.   Taking   • SUMAtriptan (IMITREX) 6 MG/0.5ML injection Inject prescribed dose at onset of headache. May repeat dose one time in 1 hour(s) if headache not relieved.        Allergies:  Patient has no known allergies.    Vital Signs  See PreOp record            Physical Exam:   Heart: RR  Lungs: CTA B  Abd: soft and NT/ND  Ext: no clubbing, cyanosis    Results Review:    I have reviewed the patient's clinical results      Class 3 severe obesity due to excess calories with serious comorbidity and body mass index (BMI) of 50.0 to 59.9 in adult (CMS/[facility])      The risks and benefits of the procedure were discussed with the patient in detail and all questions were answered.  The risks and benefits of the procedure were discussed with the patient in detail and all questions were answered.  Possibility of open, bleeding, infection, bowel injury, deep venous thrombosis, pulmonary embolism, incisional hernias, renal failure, myocardial infarction, respiratory and cardiac arrest and death were discussed. Consent will be signed and witnessed.    Shady Betancourt MD  05/20/20  07:01  Time: Approximately 15 minutes was spent with the patient and over half that time was spent counseling.  All of the patients questions were answered.

## 2020-05-20 NOTE — ANESTHESIA PROCEDURE NOTES
Airway  Urgency: elective    Date/Time: 5/20/2020 7:48 AM  Airway not difficult    General Information and Staff    Patient location during procedure: OR  Anesthesiologist: Max Hickey MD    Indications and Patient Condition  Indications for airway management: airway protection    Preoxygenated: yes  MILS maintained throughout  Mask difficulty assessment: 1 - vent by mask    Final Airway Details  Final airway type: endotracheal airway      Successful airway: ETT  Cuffed: yes   Successful intubation technique: direct laryngoscopy  Endotracheal tube insertion site: oral  Blade: Prasad  Blade size: 2  ETT size (mm): 7.0  Cormack-Lehane Classification: grade I - full view of glottis  Placement verified by: chest auscultation   Number of attempts at approach: 1  Assessment: lips, teeth, and gum same as pre-op

## 2020-05-21 ENCOUNTER — DOCUMENTATION (OUTPATIENT)
Dept: BARIATRICS/WEIGHT MGMT | Facility: CLINIC | Age: 40
End: 2020-05-21

## 2020-05-21 VITALS
RESPIRATION RATE: 18 BRPM | HEART RATE: 53 BPM | HEIGHT: 66 IN | BODY MASS INDEX: 47.09 KG/M2 | TEMPERATURE: 97.7 F | OXYGEN SATURATION: 95 % | WEIGHT: 293 LBS | SYSTOLIC BLOOD PRESSURE: 153 MMHG | DIASTOLIC BLOOD PRESSURE: 71 MMHG

## 2020-05-21 LAB
ALBUMIN SERPL-MCNC: 3.6 G/DL (ref 3.5–5.2)
ALBUMIN/GLOB SERPL: 1.4 G/DL
ALP SERPL-CCNC: 56 U/L (ref 39–117)
ALT SERPL W P-5'-P-CCNC: 30 U/L (ref 1–33)
ANION GAP SERPL CALCULATED.3IONS-SCNC: 8.2 MMOL/L (ref 5–15)
AST SERPL-CCNC: 24 U/L (ref 1–32)
BASOPHILS # BLD AUTO: 0.02 10*3/MM3 (ref 0–0.2)
BASOPHILS NFR BLD AUTO: 0.2 % (ref 0–1.5)
BILIRUB SERPL-MCNC: 0.3 MG/DL (ref 0.2–1.2)
BUN BLD-MCNC: 15 MG/DL (ref 6–20)
BUN/CREAT SERPL: 17 (ref 7–25)
CALCIUM SPEC-SCNC: 8.4 MG/DL (ref 8.6–10.5)
CHLORIDE SERPL-SCNC: 106 MMOL/L (ref 98–107)
CO2 SERPL-SCNC: 23.8 MMOL/L (ref 22–29)
CREAT BLD-MCNC: 0.88 MG/DL (ref 0.57–1)
CYTO UR: NORMAL
DEPRECATED RDW RBC AUTO: 41.3 FL (ref 37–54)
EOSINOPHIL # BLD AUTO: 0.02 10*3/MM3 (ref 0–0.4)
EOSINOPHIL NFR BLD AUTO: 0.2 % (ref 0.3–6.2)
ERYTHROCYTE [DISTWIDTH] IN BLOOD BY AUTOMATED COUNT: 12.7 % (ref 12.3–15.4)
GFR SERPL CREATININE-BSD FRML MDRD: 72 ML/MIN/1.73
GLOBULIN UR ELPH-MCNC: 2.5 GM/DL
GLUCOSE BLD-MCNC: 109 MG/DL (ref 65–99)
HCT VFR BLD AUTO: 36.2 % (ref 34–46.6)
HGB BLD-MCNC: 12.4 G/DL (ref 12–15.9)
IMM GRANULOCYTES # BLD AUTO: 0.04 10*3/MM3 (ref 0–0.05)
IMM GRANULOCYTES NFR BLD AUTO: 0.4 % (ref 0–0.5)
LAB AP CASE REPORT: NORMAL
LYMPHOCYTES # BLD AUTO: 1.78 10*3/MM3 (ref 0.7–3.1)
LYMPHOCYTES NFR BLD AUTO: 17.7 % (ref 19.6–45.3)
MAGNESIUM SERPL-MCNC: 1.9 MG/DL (ref 1.6–2.6)
MCH RBC QN AUTO: 30.9 PG (ref 26.6–33)
MCHC RBC AUTO-ENTMCNC: 34.3 G/DL (ref 31.5–35.7)
MCV RBC AUTO: 90.3 FL (ref 79–97)
MONOCYTES # BLD AUTO: 0.81 10*3/MM3 (ref 0.1–0.9)
MONOCYTES NFR BLD AUTO: 8.1 % (ref 5–12)
NEUTROPHILS # BLD AUTO: 7.39 10*3/MM3 (ref 1.7–7)
NEUTROPHILS NFR BLD AUTO: 73.4 % (ref 42.7–76)
NRBC BLD AUTO-RTO: 0 /100 WBC (ref 0–0.2)
PATH REPORT.FINAL DX SPEC: NORMAL
PATH REPORT.GROSS SPEC: NORMAL
PHOSPHATE SERPL-MCNC: 3.2 MG/DL (ref 2.5–4.5)
PLATELET # BLD AUTO: 217 10*3/MM3 (ref 140–450)
PMV BLD AUTO: 10.8 FL (ref 6–12)
POTASSIUM BLD-SCNC: 4.4 MMOL/L (ref 3.5–5.2)
PROT SERPL-MCNC: 6.1 G/DL (ref 6–8.5)
RBC # BLD AUTO: 4.01 10*6/MM3 (ref 3.77–5.28)
SODIUM BLD-SCNC: 138 MMOL/L (ref 136–145)
WBC NRBC COR # BLD: 10.06 10*3/MM3 (ref 3.4–10.8)

## 2020-05-21 PROCEDURE — 25010000002 THIAMINE PER 100 MG: Performed by: SURGERY

## 2020-05-21 PROCEDURE — 83735 ASSAY OF MAGNESIUM: CPT | Performed by: SURGERY

## 2020-05-21 PROCEDURE — 85025 COMPLETE CBC W/AUTO DIFF WBC: CPT | Performed by: SURGERY

## 2020-05-21 PROCEDURE — 25010000002 METOCLOPRAMIDE PER 10 MG: Performed by: SURGERY

## 2020-05-21 PROCEDURE — 25010000002 CYANOCOBALAMIN PER 1000 MCG: Performed by: SURGERY

## 2020-05-21 PROCEDURE — 25010000002 KETOROLAC TROMETHAMINE PER 15 MG: Performed by: SURGERY

## 2020-05-21 PROCEDURE — 25010000002 ENOXAPARIN PER 10 MG: Performed by: SURGERY

## 2020-05-21 PROCEDURE — 25010000002 HYDROMORPHONE PER 4 MG: Performed by: SURGERY

## 2020-05-21 PROCEDURE — 80053 COMPREHEN METABOLIC PANEL: CPT | Performed by: SURGERY

## 2020-05-21 PROCEDURE — 25010000002 ONDANSETRON PER 1 MG: Performed by: SURGERY

## 2020-05-21 PROCEDURE — 84100 ASSAY OF PHOSPHORUS: CPT | Performed by: SURGERY

## 2020-05-21 RX ORDER — METOCLOPRAMIDE 10 MG/1
10 TABLET ORAL
Qty: 30 TABLET | Refills: 0 | Status: SHIPPED | OUTPATIENT
Start: 2020-05-21 | End: 2020-07-02

## 2020-05-21 RX ORDER — HYDROMORPHONE HYDROCHLORIDE 2 MG/1
2 TABLET ORAL EVERY 4 HOURS PRN
Qty: 18 TABLET | Refills: 0 | Status: SHIPPED | OUTPATIENT
Start: 2020-05-21 | End: 2020-05-27

## 2020-05-21 RX ORDER — ONDANSETRON 4 MG/1
4 TABLET, ORALLY DISINTEGRATING ORAL EVERY 4 HOURS PRN
Qty: 10 TABLET | Refills: 0 | Status: SHIPPED | OUTPATIENT
Start: 2020-05-21 | End: 2020-05-27 | Stop reason: SDUPTHER

## 2020-05-21 RX ADMIN — ENOXAPARIN SODIUM 40 MG: 40 INJECTION SUBCUTANEOUS at 08:49

## 2020-05-21 RX ADMIN — KETOROLAC TROMETHAMINE 30 MG: 30 INJECTION, SOLUTION INTRAMUSCULAR at 08:50

## 2020-05-21 RX ADMIN — HYDROMORPHONE HYDROCHLORIDE 2 MG: 2 TABLET ORAL at 07:43

## 2020-05-21 RX ADMIN — METOCLOPRAMIDE HYDROCHLORIDE 10 MG: 5 INJECTION INTRAMUSCULAR; INTRAVENOUS at 05:42

## 2020-05-21 RX ADMIN — FAMOTIDINE 20 MG: 10 INJECTION, SOLUTION INTRAVENOUS at 08:50

## 2020-05-21 RX ADMIN — CYANOCOBALAMIN 1000 MCG: 1000 INJECTION, SOLUTION INTRAMUSCULAR at 08:50

## 2020-05-21 RX ADMIN — METOCLOPRAMIDE HYDROCHLORIDE 10 MG: 5 INJECTION INTRAMUSCULAR; INTRAVENOUS at 01:05

## 2020-05-21 RX ADMIN — LISINOPRIL 10 MG: 10 TABLET ORAL at 08:50

## 2020-05-21 RX ADMIN — PROPRANOLOL HYDROCHLORIDE 10 MG: 10 TABLET ORAL at 08:50

## 2020-05-21 RX ADMIN — GABAPENTIN 300 MG: 300 CAPSULE ORAL at 05:42

## 2020-05-21 RX ADMIN — ACETAMINOPHEN 1000 MG: 500 TABLET, FILM COATED ORAL at 05:42

## 2020-05-21 RX ADMIN — ONDANSETRON 4 MG: 2 INJECTION INTRAMUSCULAR; INTRAVENOUS at 04:28

## 2020-05-21 RX ADMIN — HYDROMORPHONE HYDROCHLORIDE 0.5 MG: 1 INJECTION, SOLUTION INTRAMUSCULAR; INTRAVENOUS; SUBCUTANEOUS at 04:17

## 2020-05-21 NOTE — DISCHARGE SUMMARY
"Discharge Summary    Patient name: Lizette Connolly    Medical record number: [id number]    Admission date: 5/20/2020  Discharge date:  5/21/2020    Attending physician: Dr. Shady Betancourt    Primary care physician: Trena Ferro APRN    Referring physician: Shady Betancourt Jr., MD  [address] [address]    Condition on discharge: Stable    Primary Diagnoses:  Morbid obesity with co-morbidities    Operative Procedure:  Laparoscopic sleeve gastrectomy     Lizette Connolly  is post op day one status post procedure listed. Patient denies shortness of air and lower extremity pain. Feels better than yesterday. No vomiting this am. Ambulating well and using incentive spirometer.          /71 (BP Location: Left arm, Patient Position: Sitting)   Pulse 53   Temp 97.7 °F (36.5 °C) (Oral)   Resp 18   Ht 167.6 cm (66\")   Wt (!) 142 kg (312 lb 13.3 oz)   SpO2 95%   BMI 50.49 kg/m²     General:  alert, appears stated age and cooperative   Abdomen: soft, bowel sounds active, appropriate tenderness   Incision:   healing well, no drainage, no erythema, no hernia, no seroma, no swelling, no dehiscence, incision well approximated   Heart: Regular rate   Lungs: Clear to auscultation bilaterally     I reviewed the patient's new clinical results.     Lab Results (last 24 hours)     Procedure Component Value Units Date/Time    Tissue Pathology Exam [[id number]] Collected:  05/20/20 0801    Specimen:  Tissue from Stomach Updated:  05/21/20 1042     Case Report --     Surgical Pathology Report                         Case: [id number]                                  Authorizing Provider:  Shady Betancourt Jr.,   Collected:           05/20/2020 08:01 AM                                 MD                                                                           Ordering Location:     [facility]  Received:            05/20/2020 10:14 AM                                 [facility] OR           " "                                                            Pathologist:           Jamey Miller MD                                                         Specimen:    Stomach, PORTION OF STOMACH                                                                 Final Diagnosis --     1.  Stomach, Partial Resection:  Benign stomach with    A.  Chronic gastritis.    B.  No intestinal metaplasia.   C.  No Helicobacter pylori.    abdiel/brb        Gross Description --     1. Received in formalin labeled \"portion of stomach, is a partial gastrectomy specimen consisting of a 26.5 cm long greater curvature and a 17 cm single staple line margin.  The serosa is smooth tan pink with minimal adherent adipose tissue.  The mucosa is tan pink and focally erythematous with normal rugae and the wall thickness averages 0.3 cm. Representative sections are submitted as 1A-1C.      JAP/USO/ABDIEL/brb          Microscopic Description --     Microscopic performed, incorporated in diagnosis.       Comprehensive Metabolic Panel [[id number]]  (Abnormal) Collected:  05/21/20 0558    Specimen:  Blood from Arm, Left Updated:  05/21/20 0651     Glucose 109 mg/dL      BUN 15 mg/dL      Creatinine 0.88 mg/dL      Sodium 138 mmol/L      Potassium 4.4 mmol/L      Chloride 106 mmol/L      CO2 23.8 mmol/L      Calcium 8.4 mg/dL      Total Protein 6.1 g/dL      Albumin 3.60 g/dL      ALT (SGPT) 30 U/L      AST (SGOT) 24 U/L      Alkaline Phosphatase 56 U/L      Total Bilirubin 0.3 mg/dL      eGFR Non African Amer 72 mL/min/1.73      Globulin 2.5 gm/dL      A/G Ratio 1.4 g/dL      BUN/Creatinine Ratio 17.0     Anion Gap 8.2 mmol/L     Narrative:       GFR Normal >60  Chronic Kidney Disease <60  Kidney Failure <15      Phosphorus [[id number]]  (Normal) Collected:  05/21/20 0558    Specimen:  Blood from Arm, Left Updated:  05/21/20 0651     Phosphorus 3.2 mg/dL     Magnesium [[id number]]  (Normal) Collected:  05/21/20 0558    Specimen:  Blood from Arm, Left " Updated:  05/21/20 0650     Magnesium 1.9 mg/dL     CBC & Differential [[id number]] Collected:  05/21/20 0558    Specimen:  Blood from Arm, Left Updated:  05/21/20 0630    Narrative:       The following orders were created for panel order CBC & Differential.  Procedure                               Abnormality         Status                     ---------                               -----------         ------                     CBC Auto Differential[[id number]]        Abnormal            Final result                 Please view results for these tests on the individual orders.    CBC Auto Differential [[id number]]  (Abnormal) Collected:  05/21/20 0558    Specimen:  Blood from Arm, Left Updated:  05/21/20 0630     WBC 10.06 10*3/mm3      RBC 4.01 10*6/mm3      Hemoglobin 12.4 g/dL      Hematocrit 36.2 %      MCV 90.3 fL      MCH 30.9 pg      MCHC 34.3 g/dL      RDW 12.7 %      RDW-SD 41.3 fl      MPV 10.8 fL      Platelets 217 10*3/mm3      Neutrophil % 73.4 %      Lymphocyte % 17.7 %      Monocyte % 8.1 %      Eosinophil % 0.2 %      Basophil % 0.2 %      Immature Grans % 0.4 %      Neutrophils, Absolute 7.39 10*3/mm3      Lymphocytes, Absolute 1.78 10*3/mm3      Monocytes, Absolute 0.81 10*3/mm3      Eosinophils, Absolute 0.02 10*3/mm3      Basophils, Absolute 0.02 10*3/mm3      Immature Grans, Absolute 0.04 10*3/mm3      nRBC 0.0 /100 WBC              Assessment:      Doing well postoperatively.      Plan:   1. Continue Stage 1 diet  2. Continue with ambulation and Incentive spirometry  3. Plan for d/c home    Patient was seen and examined by Dr. Betancourt.    Hospital Course: The patient is a very pleasant 39 y.o. female that was admitted to the hospital with morbid obesity who underwent above procedure.  Postoperatively the patient was transferred to the bariatric unit per protocol.  Patient remained afebrile and hemodynamically stable.  Patient was up ambulating well and using incentive spirometer.  Postoperatively  day 1 patient was started on stage I bariatric diet and continued with good ambulation.  Lovenox was continued.  Patient was then discharged home.      Discharge medications:      Discharge Medications      New Medications      Instructions Start Date   HYDROmorphone 2 MG tablet  Commonly known as:  DILAUDID   2 mg, Oral, Every 4 Hours PRN      ondansetron ODT 4 MG disintegrating tablet  Commonly known as:  ZOFRAN-ODT   4 mg, Translingual, Every 4 Hours PRN         Continue These Medications      Instructions Start Date   ascorbic acid 100 MG tablet  Commonly known as:  VITAMIN C   100 mg, Oral, Daily      Erenumab-aooe 70 MG/ML prefilled syringe  Commonly known as:  AIMOVIG   140 mg, Subcutaneous, Every 30 Days      fish oil 1000 MG capsule capsule   1,000 mg, Oral, Daily With Breakfast, HELD FOR SURGERY      folic acid-vit B6-vit B12 2.5-25-1 MG tablet tablet  Commonly known as:  FOLBEE   1 tablet, Oral, Daily      gabapentin 100 MG capsule  Commonly known as:  NEURONTIN   100 mg, Oral, 3 Times Daily      hydrOXYzine 25 MG tablet  Commonly known as:  ATARAX   25 mg, Oral, As Needed      lansoprazole 30 MG capsule  Commonly known as:  PREVACID   30 mg, Oral, Daily      lisinopril 10 MG tablet  Commonly known as:  PRINIVIL,ZESTRIL   10 mg, Oral, Daily      Magnesium 100 MG capsule   100 mg, Oral, Daily      Mirena (52 MG) 20 MCG/24HR IUD  Generic drug:  levonorgestrel   1 each, Intrauterine, Once      Pen Needles 31G X 6 MM misc   No dose, route, or frequency recorded.      propranolol 10 MG tablet  Commonly known as:  INDERAL   10 mg, Oral, 2 Times Daily      PROZAC PO   60 mg, Oral, Daily      SUMAtriptan 100 MG tablet  Commonly known as:  IMITREX   100 mg, Oral, Every 2 Hours PRN, Take one tablet at onset of headache. May repeat dose one time in 2 hours if headache not relieved.       SUMAtriptan 6 MG/0.5ML injection  Commonly known as:  IMITREX   6 mg, Subcutaneous, Once As Needed      vitamin b complex capsule  capsule   1 capsule, Oral, Daily         Stop These Medications    Chlorhexidine Gluconate Cloth 2 % pads     cholecalciferol 25 MCG (1000 UT) tablet  Commonly known as:  VITAMIN D3     MULTI VITAMIN DAILY PO     promethazine 12.5 MG suppository  Commonly known as:  PHENERGAN            Discharge instructions:  Per Bariatric manual; per our protocol      Follow-up appointment: Follow up with Dr. Betancourt in the office as scheduled.  If not already scheduled call for appointment at [phone].

## 2020-05-21 NOTE — PROGRESS NOTES
Case Management Discharge Note      Final Note: Discharged home with no needs. Transported by family         Destination      No service has been selected for the patient.      Durable Medical Equipment      No service has been selected for the patient.      Dialysis/Infusion      No service has been selected for the patient.      Home Medical Care      No service has been selected for the patient.      Therapy      No service has been selected for the patient.      Community Resources      No service has been selected for the patient.        Transportation Services  Private: Car    Final Discharge Disposition Code: 01 - home or self-care

## 2020-05-21 NOTE — PLAN OF CARE
Problem: Patient Care Overview  Goal: Plan of Care Review  Outcome: Ongoing (interventions implemented as appropriate)  Flowsheets (Taken 5/21/2020 0320)  Progress: improving  Plan of Care Reviewed With: patient  Outcome Summary: Vital signs stable post op day #1. Pt pain improved with ambulation and prn medication see mar. Pt o2 sats maintained at 92% or greater on room air. Pt independent with Incentive Spirometer. Incision lap sites x5 clean dry and intact and open to air. Pt voiding without difficulty. Pt ambulating in room and hallway x4 and tolerating well this shift. Will continue to monitor.

## 2020-05-21 NOTE — DISCHARGE INSTRUCTIONS
GOING HOME AFTER GASTRIC SLEEVE/ GASTRIC BYPASS SURGERY  [facility] Weight Loss: Post-Operative Information/Instructions  Shady Betancourt Jr., MD  General Patient Instructions for Discharge   - Call Surgeon's office at [phone] for follow-up appointment.    - Be sure you, the patient, have a follow-up appointment to be seen within seven (7) days after discharge. If not, please call [phone] to schedule an appointment. If you are discharged on a Saturday or Sunday, please call Monday to schedule the appointment.  - Contact the Surgeon at [phone] for any questions or concerns, including temperature greater than or equal to 101F, shortness of breath, leg swelling, redness at incision sites, nausea, vomiting, chills, or problems or questions.    - Follow the Gastric Stage 1 Diet    à Clear liquids, room temperature, sugar-free, caffeine-free, non-carbonated, 70 grams of protein, No Straws.  - You may shower. No tub bath for 2 weeks.  - No lifting, pushing, pulling, or tugging >25 pounds for 3 weeks.  - Ambulate every 3 hours while awake minimum for seven (7) days, increase distance daily.  - For the next several weeks, you are at an increased risk for blood clot formation. Therefore, you should walk regularly. You should not sit for prolonged periods of time, more than 45 minutes, without getting up and walking for 5-10 minutes. This includes any car rides, including the drive home from the hospital. If driving any distance greater than 30 miles over the next two (2) weeks, stop every 30-45 minutes and walk for 5-10 minutes each time.  - Continue using Incentive Spirometer and coughing exercises at least every two (2) hours while awake for one week.  - Continue use of CPAP/BIPAP for diagnosis of sleep apnea as directed.  - No driving or operating machinery allowed while taking narcotic (prescription) pain medication, and until you feel comfortable forcefully applying the brakes if needed. (This  usually takes more than 3 days.)    - Make an appointment with your Primary Care Physician within one week post-op to look at your home medications for possible changes or discontinuity.   Medications  - The nurse will provide a list of medications for you to continue at home   - If you received a Lovenox (Enoxaparin) or Apixiban (Eliquis) prescription at pre-op visit with Surgeon, start taking the medicine the morning after discharge unless directed otherwise.    - If you were prescribed Lovenox (Enoxaparin), review the education/teaching material/video with the nurse.    - Take post op pain meds as prescribed as needed.   - Continue Foltx until finished.   - Resume use of Actigall (Ursodiol) one (1) week after surgery if patient still has gallbladder. You should have been given a prescription at your pre-op visit. Contact the office if you do not have the prescription.   - Resume bariatric vitamin regimen as instructed in pre-op education with bariatric coordinator.    - Zegerid or Prilosec OTC (or generic) by mouth once daily for four (4) weeks unless you are already taking a proton pump inhibitor as home medication. Follow dosing instructions on package.   Nausea/Vomiting:  The following are possible causes for nausea/vomiting:  - Drinking too much or too fast.  - Sinus drainage/post nasal drip for allergy sufferers (you may take Sudafed, Claritin, Tylenol Sinus/Allergy, or other decongestants and nose sprays to help with this discomfort).  - Low blood sugar (sweating, shaky, irritable, weakness, dizzy or tunnel-vision) - treatment is to sip 100% fruit juice - no sugar added until symptoms subside.  - Acid in fruit juice - (may dilute with water or avoid).  - Eating or drinking something that is not on clear liquid (stage 1) diet.  Any nausea/vomiting that prohibits you from keeping fluids down for greater than 24 hours requires a call to the surgeon's office.  Urine:  Use your urine color as a guide to  determine if you are drinking enough fluid. The darker the urine, the more fluids you need to drink. Urine should be clear to light yellow if you are getting enough fluid. If you should experience frequency, burning or pain with urination, blood in urine, contact us or your primary care physician for possible UTI (urinary tract infection), which could require antibiotics (liquid preferred).  Bowel Movements:  You may not have a bowel movement for 2-5 days after going home. You may then experience liquid, runny or loose stools for approximately 3-4 weeks following surgery. This would require you to drink even more fluids to prevent dehydration. Some patients may experience constipation, which can be treated with increased fluids, drinking warm liquids, increased activity and the use of a Fleets Enema, Milk of Magnesia, or suppositories. The first couple of bowel movements could be bloody, tarry black or dark maroon in color. This is OK as long as the stool returns to a normal color in 1-2 days. If however, you have frequent or a large amount of bloody or tarry black stools and/or become light-headed or dizzy, you may be bleeding and require urgent attention. Please call us right away.  Abdominal Incisions:  You will have small incisions. Do not scrub incisions, but allow the warm, soapy water to run over the incisions, rinse well, and pat dry. You may use any brand of anti-bacterial soap. Do not use Peroxide or Neosporin type ointments on sites, unless instructed to do so by a surgeon or nurse. Monitor daily for signs/symptoms of infection, which might include: drainage with a foul odor, pain, redness, swelling or heat at the incision sight; fever, body aches and chills. If you suspect infection or have a fever, give us a call.  Pain:  You will be given a prescription for pain medication to control your pain. If you feel the dose is too strong, you may take half the ordered dose, or you may take Tylenol adult liquid  per package instructions for minor pain. Do not take any medications that contain aspirin or aspirin products.  Do not take medications like: Motrin, Aleve, Ibuprofen, Advil, Naproxen, Celebrex, Daypro, Bextra, Meloxicam or other medications commonly used for arthritis or joint pain.  No steroids or cortisone injections. There may be pain, which should improve every few days. Pain should not suddenly get worse or more intense. Pain that suddenly changes and is constant and severe should be called in to the surgeon's office. Any sudden pain in the lower extremities with associated warmth and redness should be called in to the surgeon's office immediately. Do not rub or massage this area, as it could be a blood clot.  Diet:  Remain on the clear liquid diet (stage 1) per your  which includes 70 grams of protein each day, sugar free, non carbonated and no straws. Day 1 is the day of surgery. If you are tolerating the stage 1 diet, you may then proceed to stage 2 diet, as instructed in the . Do not progress to the stage 2 diet if you are having nausea/vomiting. Refer to the Basic Nutrition and Food Principles guide.  Medications:  The nurse will let you know which medications you will need to continue once you go home. Do not take any medications that are extended or time released if you had the gastric bypass procedure, OK to take if you had the gastric sleeve procedure. Large capsules can be opened and diluted with clear liquids. Check with your physician or pharmacist as to which pills may be crushed and which capsules may be opened and diluted safely. Continue taking Foltx as surgeon orders. If you still have your gall bladder and were prescribed Actigall (Ursodiol), you may resume this medication one week after your surgery. You will remain on Actigall (Ursodiol) for approximately 6 months. The dose is 1 pill, 2 times each day for 6 months.  Activity:  Continue your deep breathing and  coughing exercises with your Incentive Spirometer breathing device at least every 3 hours while awake (10 repetitions each time) for one week. May use CPAP. This will help to prevent respiratory problems such as pneumonia. No lifting, pulling or tugging anything over 25 pounds for 3 weeks after surgery. You may shower but no tub baths, hot tubs or swimming for 2 weeks. Moderate walking is recommended every 3 hours while awake minimum, increase distance daily. Further exercise will be discussed at the first post-op visit. No driving or operating machinery allow until off narcotic pain medication and until you feel comfortable forcefully applying the brakes (usually takes 3 or more days). For the next few weeks you are at an increased risk for blood clot formation. Therefore you should walk regularly and you should not sit for prolonged periods of time, more than 45 minutes without getting up and walking for 5-10 minutes. This includes car rides. Including riding home from the hospital. If riding a distance greater than 30 miles over the next 2 weeks stop every 30-45 minutes and walk 5-10 mintues each time. No tanning bed use for 8 weeks after surgery and in general, not recommended due to the increased risk for skin cancer. Incisions will burn/blister very badly with tanning bed use.  Illness:  Your primary care physician should treat general illness such as ear infections, sinus infections, and viral type illnesses, etc. Medications prescribed should be liquid/elixir form when possible, for the first 30 days.  General:  In general, it is recommended that you weigh yourself no more than once per week. Let the weight come off you and concentrate on more important things. Remember the weight was not gained overnight, nor will it be lost overnight. Gastric Bypass/ Gastric Sleeve weight loss will continue over a period of 12-18 months. Do not  yourself according to how others are doing after surgery, as this will  cause unnecessary discouragement.  THE ABOVE ARE GENERAL GUIDELINES TO ASSIST YOU ONCE HOME, IF YOU ARE IN DOUBT, OR YOU HAVE ANY QUESTIONS, CALL US AT THE NUMBERS LISTED BELOW.  IN THE EVENT OF SUDDEN CHEST PAIN, SHORTNESS OF BREATH, OR ANY LIFE THREATENING CONDITION, CALL 911.  Any time you are evaluated or admitted to another facility, please have someone notify the surgeon's office.  Supplements:  70 grams of protein taken EVERY DAY. Remember to drink at least 64 ounces of fluid a day, sipping slowly early on. Increase this amount during the summertime. Sipping slowly will not stretch your new stomach. Drinking too fast or gulping liquids will cause brief discomfort and early could cause staple line disruption (leak). With eating, tiny bites, then chew, chew, chew, and swallow. Lay your fork/spoon down for 2-3 minutes, and then take your next bite. Your pouch will tell you within 1-2 bites if it is going to tolerate what you are eating.   Protein Vendors:  Refer to protein vendors' handout from consult class. You can always find protein drinks at the bariatric office, grocery stores, [employer], drug [employer], [employer], health food stores, and on the Internet. Find one high in protein (15-30 grams per serving) and low carb (less than 18 grams per serving).  Now is a great time to re-read your . Please review specific instructions given to you at discharge by your physician (surgeon).  HOW/WHEN TO CONTACT US:  It is imperative that you contact us with any of the following:    Ÿ fever greater than 101 degrees  Ÿ shortness of breath  Ÿ leg swelling  Ÿ body aches  Ÿ shaking chills  Ÿ nausea and vomitting  Ÿ pain that has worsened  Ÿ redness at incision sites  Ÿ pus or foul smelling drainage from an incision or wound  Ÿ inability to keep fluids down for more than a day  Ÿ any other condition you feel needs our attention.  [facility] - Bariatric: [phone] call this number anytime 24 hours a  day / 7 days a week.  Teach-back Questions to be answered by the patient prior to discharge.   What complications would prompt you to call your doctor when you return home? _________________    What is the purpose of your prescribed medication? ________________  What are some potential side effects of the medications you will be taking at home? _______________

## 2020-05-21 NOTE — PLAN OF CARE
Problem: Patient Care Overview  Goal: Plan of Care Review  Outcome: Ongoing (interventions implemented as appropriate)  Flowsheets (Taken 5/21/2020 1017)  Progress: improving  Plan of Care Reviewed With: patient    Note:   Vitals stable. Pain controlled. +ambulation. Tolerating reim stage 1 diet. Pt discharged home.         Goal: Individualization and Mutuality  Outcome: Ongoing (interventions implemented as appropriate)  Goal: Discharge Needs Assessment  Outcome: Ongoing (interventions implemented as appropriate)  Goal: Interprofessional Rounds/Family Conf  Outcome: Ongoing (interventions implemented as appropriate)     Problem: Bariatric Surgery (Adult,Pediatric)  Goal: Signs and Symptoms of Listed Potential Problems Will be Absent, Minimized or Managed (Bariatric Surgery)  Outcome: Ongoing (interventions implemented as appropriate)  Goal: Anesthesia/Sedation Recovery  Outcome: Ongoing (interventions implemented as appropriate)

## 2020-05-22 ENCOUNTER — TELEPHONE (OUTPATIENT)
Dept: BARIATRICS/WEIGHT MGMT | Facility: CLINIC | Age: 40
End: 2020-05-22

## 2020-05-22 NOTE — TELEPHONE ENCOUNTER
----- Message from Shady Betancourt Jr., MD sent at 5/22/2020  9:43 AM EDT -----  I talked to patient and instructed her to stay off the narcotic pain medicine and to use Tylenol which she is tolerating to do.  She states that Zofran helps with the nausea and will start reglan today.  ----- Message -----  From: Sweetie Hermosillo CMA  Sent: 5/22/2020   8:45 AM EDT  To: Shady Betancourt Jr., MD    Patient had GS surgery 5/20 and is having issues with nausea due to the pain medication, she is wanting to see if something else can be sent in. She has tried to take Zofran with the pain medication and it does not help. Please advise.    Thank you!

## 2020-05-27 ENCOUNTER — OFFICE VISIT (OUTPATIENT)
Dept: BARIATRICS/WEIGHT MGMT | Facility: CLINIC | Age: 40
End: 2020-05-27

## 2020-05-27 VITALS
HEART RATE: 56 BPM | DIASTOLIC BLOOD PRESSURE: 87 MMHG | WEIGHT: 293 LBS | SYSTOLIC BLOOD PRESSURE: 172 MMHG | BODY MASS INDEX: 47.09 KG/M2 | TEMPERATURE: 97.3 F | RESPIRATION RATE: 18 BRPM | HEIGHT: 66 IN

## 2020-05-27 DIAGNOSIS — E66.01 OBESITY, CLASS III, BMI 40-49.9 (MORBID OBESITY) (HCC): Primary | ICD-10-CM

## 2020-05-27 DIAGNOSIS — Z98.84 S/P LAPAROSCOPIC SLEEVE GASTRECTOMY: ICD-10-CM

## 2020-05-27 DIAGNOSIS — Z71.3 DIETARY COUNSELING: ICD-10-CM

## 2020-05-27 PROBLEM — Z01.818 PRE-OP EVALUATION: Status: RESOLVED | Noted: 2020-01-21 | Resolved: 2020-05-27

## 2020-05-27 PROBLEM — E66.813 CLASS 3 SEVERE OBESITY DUE TO EXCESS CALORIES WITH SERIOUS COMORBIDITY AND BODY MASS INDEX (BMI) OF 50.0 TO 59.9 IN ADULT: Status: RESOLVED | Noted: 2020-03-06 | Resolved: 2020-05-27

## 2020-05-27 PROCEDURE — 99024 POSTOP FOLLOW-UP VISIT: CPT | Performed by: NURSE PRACTITIONER

## 2020-05-27 RX ORDER — ONDANSETRON 4 MG/1
4 TABLET, ORALLY DISINTEGRATING ORAL EVERY 4 HOURS PRN
Qty: 10 TABLET | Refills: 0 | Status: SHIPPED | OUTPATIENT
Start: 2020-05-27 | End: 2020-06-04

## 2020-05-27 NOTE — PROGRESS NOTES
[facility] BARIATRIC SURGERY  [address] [address]  [phone]  [address] [id number] [phone]  Dept: [phone]  5/27/2020      Lizette Connolly.  [id number]  [id number]  1980  female      Chief Complaint   Patient presents with   • Follow-up     1 week post op sleeve       BH Post-Op Bariatric Surgery:   Lizette Connolly is status post laparopscopic Laparoscopic Sleeve procedure, performed on 5/20/20.     HPI:   Today's weight is (!) 138 kg (305 lb) pounds, today's BMI is Body mass index is 49.25 kg/m².,@ has a  loss of 27 pounds since the last visit and@ weight loss since surgery is 27 pounds. The patient reports a decreased portion size and loss of appetite.  Lizette Connolly denies vomiting/regular/dysphagia/reflux and reports nausea that is improving and spasms when drinking.. The patient c/o appropriate post-op incisional discomfort that is improving. she is doing well with protein and water intake so far. Taking their vitamins, walking and using IS. Denies fevers, chills, chest pain or shortness of air.      Diet and Exercise: Diet history reviewed and discussed with the patient. Weight loss/gains to date discussed with the patient. No carbonated beverage consumption and exercising regularly- walking frequently.   Supplements: multivitamins, B-12, calcium, iron, B-1 and Vitamin D.   Patient is taking blood thinner as prescribed- lovenox injections    Review of Systems   Gastrointestinal: Positive for abdominal pain (post op) and diarrhea.   All other systems reviewed and are negative.      Patient Active Problem List   Diagnosis   • Chronic fatigue   • GERD (gastroesophageal reflux disease)   • Anxiety and depression   • Obesity, Class III, BMI 40-49.9 (morbid obesity) (CMS/HCC)   • Essential hypertension   • Vitamin D deficiency   • PCOS (polycystic ovarian syndrome)   • Hypertriglyceridemia   •  Migraine   • Dietary counseling   • Snoring   • S/P laparoscopic sleeve gastrectomy       The following portions of the patient's history were reviewed and updated as appropriate: allergies, current medications, past family history, past medical history, past social history, past surgical history and problem list.    Vitals:    05/27/20 1157   BP: 172/87   Pulse: 56   Resp: 18   Temp: 97.3 °F (36.3 °C)       Physical Exam   Constitutional: She is oriented to person, place, and time. She appears well-developed and well-nourished.   HENT:   Head: Normocephalic and atraumatic.   Eyes: EOM are normal.   Cardiovascular: Normal rate, regular rhythm and normal heart sounds.   Pulmonary/Chest: Effort normal and breath sounds normal.   Abdominal: Soft. Bowel sounds are normal. She exhibits no distension. There is tenderness (post op).   Musculoskeletal: Normal range of motion.   Neurological: She is alert and oriented to person, place, and time.   Skin: Skin is warm and dry.   Incisions healing well   Psychiatric: She has a normal mood and affect. Her behavior is normal. Judgment and thought content normal.   Vitals reviewed.      Assessment:   Post-op, the patient is doing well. Her nausea is improving and she is doing well with her protein intake and water.     Encounter Diagnoses   Name Primary?   • Obesity, Class III, BMI 40-49.9 (morbid obesity) (CMS/HCC) Yes   • S/P laparoscopic sleeve gastrectomy    • Dietary counseling        Plan:   Reviewed with patient the importance of following the manual for diet progression. Increase activity as tolerated. Continue increasing daily intake of protein and water. Call our office if nausea worsens, spasms do not continue to improve or she is not able to get in enough fluids.   Return to work: the patient is to return to 3 weeks from their surgery date with no restrictions unless they develop medical problems in which we will see them back in the office. They received a note in our  office today with their return to work date.  Activity restrictions: no lifting, pushing or pulling over 25lbs for 3 weeks.   Recommended patient be sure to get at least 70 grams of protein per day. Discussed with the patient the recommended amount of water per day to intake. Reviewed vitamin requirements. Be sure to do routine exercise and increase activity as tolerated. No asa, nsaids or steroids for 8 weeks. Patient may use miralax as needed if necessary.     Instructions / Recommendations: dietary counseling recommended, recommended a daily protein intake of  grams, vitamin supplement(s) recommended, recommended exercising at least 150 minutes per week, behavior modifications recommended and instructed to call the office for concerns, questions, or problems.     The patient was instructed to follow up at one month follow up appt.     The patient was counseled regarding post op bariatric manual

## 2020-06-03 NOTE — TELEPHONE ENCOUNTER
Please advise. This was refilled at her 1 wk post op on 5/27 but I called and spoke with the patient, she is saying she is still having issues with nausea whenever she eats. Patient denied vomiting but stated she is having issues with swallowing and food getting stuck. Patient stated this does happen with liquid as well. Please advise.

## 2020-06-04 ENCOUNTER — TELEPHONE (OUTPATIENT)
Dept: BARIATRICS/WEIGHT MGMT | Facility: CLINIC | Age: 40
End: 2020-06-04

## 2020-06-04 RX ORDER — ONDANSETRON 4 MG/1
TABLET, ORALLY DISINTEGRATING ORAL
Qty: 10 TABLET | Refills: 0 | OUTPATIENT
Start: 2020-06-04

## 2020-06-04 RX ORDER — ONDANSETRON 4 MG/1
TABLET, ORALLY DISINTEGRATING ORAL
Qty: 10 TABLET | Refills: 0 | Status: SHIPPED | OUTPATIENT
Start: 2020-06-04 | End: 2020-07-02

## 2020-06-04 NOTE — TELEPHONE ENCOUNTER
I didn't know if her having issues with swallowing was any concern. I will let her know the refill was sent in. Thanks!

## 2020-06-04 NOTE — TELEPHONE ENCOUNTER
Called patient and let her know Zofran refill was called into her pharmacy by Melody. I discussed with her per Melody to make sure she is getting plenty of fluids in as well as protein. Patient stated that she is trying, I advised her to call the office back if symptoms do not improve. Patient agreed with the plan.

## 2020-06-09 ENCOUNTER — TELEPHONE (OUTPATIENT)
Dept: BARIATRICS/WEIGHT MGMT | Facility: CLINIC | Age: 40
End: 2020-06-09

## 2020-06-09 NOTE — TELEPHONE ENCOUNTER
Called to check on patient and she says she is feeling much better. Tolerating her liquids at this point without discomfort and nausea is resolved. Patient will call back if she needs anything.

## 2020-07-02 ENCOUNTER — LAB (OUTPATIENT)
Dept: LAB | Facility: HOSPITAL | Age: 40
End: 2020-07-02

## 2020-07-02 ENCOUNTER — OFFICE VISIT (OUTPATIENT)
Dept: BARIATRICS/WEIGHT MGMT | Facility: CLINIC | Age: 40
End: 2020-07-02

## 2020-07-02 VITALS
DIASTOLIC BLOOD PRESSURE: 88 MMHG | SYSTOLIC BLOOD PRESSURE: 132 MMHG | WEIGHT: 287 LBS | RESPIRATION RATE: 18 BRPM | HEIGHT: 66 IN | TEMPERATURE: 97.7 F | HEART RATE: 58 BPM | BODY MASS INDEX: 46.12 KG/M2

## 2020-07-02 DIAGNOSIS — I10 ESSENTIAL HYPERTENSION: ICD-10-CM

## 2020-07-02 DIAGNOSIS — F41.9 ANXIETY AND DEPRESSION: ICD-10-CM

## 2020-07-02 DIAGNOSIS — K21.9 GASTROESOPHAGEAL REFLUX DISEASE WITHOUT ESOPHAGITIS: ICD-10-CM

## 2020-07-02 DIAGNOSIS — E28.2 PCOS (POLYCYSTIC OVARIAN SYNDROME): ICD-10-CM

## 2020-07-02 DIAGNOSIS — Z98.84 S/P LAPAROSCOPIC SLEEVE GASTRECTOMY: ICD-10-CM

## 2020-07-02 DIAGNOSIS — E66.01 OBESITY, CLASS III, BMI 40-49.9 (MORBID OBESITY) (HCC): Primary | ICD-10-CM

## 2020-07-02 DIAGNOSIS — E66.01 OBESITY, CLASS III, BMI 40-49.9 (MORBID OBESITY) (HCC): ICD-10-CM

## 2020-07-02 DIAGNOSIS — E78.1 HYPERTRIGLYCERIDEMIA: ICD-10-CM

## 2020-07-02 DIAGNOSIS — F32.A ANXIETY AND DEPRESSION: ICD-10-CM

## 2020-07-02 LAB
25(OH)D3 SERPL-MCNC: 48.3 NG/ML (ref 30–100)
ALBUMIN SERPL-MCNC: 4 G/DL (ref 3.5–5.2)
ALBUMIN/GLOB SERPL: 1.8 G/DL
ALP SERPL-CCNC: 66 U/L (ref 39–117)
ALT SERPL W P-5'-P-CCNC: 24 U/L (ref 1–33)
ANION GAP SERPL CALCULATED.3IONS-SCNC: 9.8 MMOL/L (ref 5–15)
AST SERPL-CCNC: 16 U/L (ref 1–32)
BASOPHILS # BLD AUTO: 0.04 10*3/MM3 (ref 0–0.2)
BASOPHILS NFR BLD AUTO: 0.6 % (ref 0–1.5)
BILIRUB SERPL-MCNC: 0.3 MG/DL (ref 0.2–1.2)
BUN SERPL-MCNC: 11 MG/DL (ref 6–20)
BUN/CREAT SERPL: 13.9 (ref 7–25)
CALCIUM SPEC-SCNC: 9.1 MG/DL (ref 8.6–10.5)
CHLORIDE SERPL-SCNC: 105 MMOL/L (ref 98–107)
CO2 SERPL-SCNC: 26.2 MMOL/L (ref 22–29)
CREAT SERPL-MCNC: 0.79 MG/DL (ref 0.57–1)
DEPRECATED RDW RBC AUTO: 43.6 FL (ref 37–54)
EOSINOPHIL # BLD AUTO: 0.22 10*3/MM3 (ref 0–0.4)
EOSINOPHIL NFR BLD AUTO: 3.1 % (ref 0.3–6.2)
ERYTHROCYTE [DISTWIDTH] IN BLOOD BY AUTOMATED COUNT: 13.1 % (ref 12.3–15.4)
FERRITIN SERPL-MCNC: 201 NG/ML (ref 13–150)
FOLATE SERPL-MCNC: >20 NG/ML (ref 4.78–24.2)
GFR SERPL CREATININE-BSD FRML MDRD: 81 ML/MIN/1.73
GLOBULIN UR ELPH-MCNC: 2.2 GM/DL
GLUCOSE SERPL-MCNC: 90 MG/DL (ref 65–99)
HCT VFR BLD AUTO: 41.4 % (ref 34–46.6)
HGB BLD-MCNC: 13.6 G/DL (ref 12–15.9)
IMM GRANULOCYTES # BLD AUTO: 0.04 10*3/MM3 (ref 0–0.05)
IMM GRANULOCYTES NFR BLD AUTO: 0.6 % (ref 0–0.5)
IRON 24H UR-MRATE: 67 MCG/DL (ref 37–145)
LYMPHOCYTES # BLD AUTO: 2.33 10*3/MM3 (ref 0.7–3.1)
LYMPHOCYTES NFR BLD AUTO: 32.5 % (ref 19.6–45.3)
MCH RBC QN AUTO: 30.2 PG (ref 26.6–33)
MCHC RBC AUTO-ENTMCNC: 32.9 G/DL (ref 31.5–35.7)
MCV RBC AUTO: 92 FL (ref 79–97)
MONOCYTES # BLD AUTO: 0.49 10*3/MM3 (ref 0.1–0.9)
MONOCYTES NFR BLD AUTO: 6.8 % (ref 5–12)
NEUTROPHILS NFR BLD AUTO: 4.05 10*3/MM3 (ref 1.7–7)
NEUTROPHILS NFR BLD AUTO: 56.4 % (ref 42.7–76)
NRBC BLD AUTO-RTO: 0 /100 WBC (ref 0–0.2)
PLATELET # BLD AUTO: 243 10*3/MM3 (ref 140–450)
PMV BLD AUTO: 11.3 FL (ref 6–12)
POTASSIUM SERPL-SCNC: 4.5 MMOL/L (ref 3.5–5.2)
PROT SERPL-MCNC: 6.2 G/DL (ref 6–8.5)
RBC # BLD AUTO: 4.5 10*6/MM3 (ref 3.77–5.28)
SODIUM SERPL-SCNC: 141 MMOL/L (ref 136–145)
WBC # BLD AUTO: 7.17 10*3/MM3 (ref 3.4–10.8)

## 2020-07-02 PROCEDURE — 36415 COLL VENOUS BLD VENIPUNCTURE: CPT

## 2020-07-02 PROCEDURE — 83921 ORGANIC ACID SINGLE QUANT: CPT

## 2020-07-02 PROCEDURE — 82306 VITAMIN D 25 HYDROXY: CPT

## 2020-07-02 PROCEDURE — 84425 ASSAY OF VITAMIN B-1: CPT

## 2020-07-02 PROCEDURE — 82746 ASSAY OF FOLIC ACID SERUM: CPT

## 2020-07-02 PROCEDURE — 82728 ASSAY OF FERRITIN: CPT

## 2020-07-02 PROCEDURE — 84134 ASSAY OF PREALBUMIN: CPT

## 2020-07-02 PROCEDURE — 85025 COMPLETE CBC W/AUTO DIFF WBC: CPT

## 2020-07-02 PROCEDURE — 99024 POSTOP FOLLOW-UP VISIT: CPT | Performed by: NURSE PRACTITIONER

## 2020-07-02 PROCEDURE — 83540 ASSAY OF IRON: CPT

## 2020-07-02 PROCEDURE — 80053 COMPREHEN METABOLIC PANEL: CPT

## 2020-07-02 RX ORDER — ERENUMAB-AOOE 140 MG/ML
1 INJECTION, SOLUTION SUBCUTANEOUS
COMMUNITY
Start: 2020-06-16 | End: 2022-08-09

## 2020-07-02 NOTE — PROGRESS NOTES
[facility] BARIATRIC SURGERY  [address] [facility] [address]  [facility] [address]  [phone]  [address] [address]  [phone]  Dept: [phone]  7/2/2020      Lizette Connolly.  [id number]  [id number]  1980  female      Chief Complaint   Patient presents with   • Follow-up     1 month post op sleeve       BH Post-Op Bariatric Surgery:   Lizette Connolly is status post Laparoscopic Sleeve procedure, performed on 5/20/20     HPI:   Today's weight is 130 kg (287 lb) pounds, today's BMI is Body mass index is 46.35 kg/m²., she has a  loss of 18 pounds since the last visit and her weight loss since surgery is 45 pounds. The patient reports a decreased portion size and loss of appetite.      She reports that she is still having heartburn when lying flat.     Lizette Connolly denies nausea vomiting reflux regurgitation or dysphasia and reports that she is doing well with her dietary intake she can comfortably tolerate 1-2 bites of chicken     Diet and Exercise: Diet history reviewed and discussed with the patient. Weight loss/gains to date discussed with the patient. The patient states they are eating 60-70 grams of protein per day. She reports eating 3 meals per day, a typical portion size of 1/2 cup, eating 1 snacks per day, drinking 5-6 or more 8-oz. glasses of water per day, no carbonated beverage consumption and exercising regularly.     Supplements: BA MTV with iron-ports that she is throwing this up more often than not despite eating and in the evenings with food.     Review of Systems   Constitutional: Positive for appetite change. Negative for fatigue and unexpected weight change.   HENT: Negative.    Eyes: Negative.    Respiratory: Negative.    Cardiovascular: Negative.  Negative for leg swelling.   Gastrointestinal: Positive for nausea and vomiting. Negative for abdominal distention, abdominal pain, constipation and diarrhea.    Genitourinary: Negative for difficulty urinating, frequency and urgency.   Musculoskeletal: Negative for back pain.   Skin: Negative.    Psychiatric/Behavioral: Negative.    All other systems reviewed and are negative.      Patient Active Problem List   Diagnosis   • Chronic fatigue   • GERD (gastroesophageal reflux disease)   • Anxiety and depression   • Obesity, Class III, BMI 40-49.9 (morbid obesity) (CMS/HCC)   • Essential hypertension   • Vitamin D deficiency   • PCOS (polycystic ovarian syndrome)   • Hypertriglyceridemia   • Migraine   • Dietary counseling   • Snoring   • S/P laparoscopic sleeve gastrectomy       Past Medical History:   Diagnosis Date   • Anxiety and depression    • GERD (gastroesophageal reflux disease)    • Hip pain 01/07/2019    RIGHT   • HPV in female    • Hypertension    • Kidney stones 01/07/2019   • Migraine    • PCOS (polycystic ovarian syndrome)    • PONV (postoperative nausea and vomiting)    • Sleep apnea     DX IN MARCH, HAS NOT GOTTEN CPAP YET       The following portions of the patient's history were reviewed and updated as appropriate: allergies, current medications, past family history, past medical history, past social history, past surgical history and problem list.    Vitals:    07/02/20 1542   BP: 132/88   Pulse: 58   Resp: 18   Temp: 97.7 °F (36.5 °C)       Physical Exam   Constitutional: She appears well-developed and well-nourished.   Neck: No thyromegaly present.   Cardiovascular: Normal rate, regular rhythm and normal heart sounds.   Pulmonary/Chest: Effort normal and breath sounds normal. No respiratory distress. She has no wheezes.   Abdominal: Soft. Bowel sounds are normal. She exhibits no distension. There is no tenderness. There is no guarding. No hernia.   Musculoskeletal: She exhibits no edema or tenderness.   Neurological: She is alert.   Skin: Skin is warm and dry. No rash noted. No erythema.   Psychiatric: She has a normal mood and affect. Her behavior is  normal.   Nursing note and vitals reviewed.      Assessment:   Post-op, the patient is doing well.     Encounter Diagnoses   Name Primary?   • Obesity, Class III, BMI 40-49.9 (morbid obesity) (CMS/[facility], located [facility]) Yes   • Essential hypertension    • Gastroesophageal reflux disease without esophagitis    • Anxiety and depression    • PCOS (polycystic ovarian syndrome)    • S/P laparoscopic sleeve gastrectomy        Plan:     Encouraged patient to be sure to get plenty of lean protein per day through small frequent meals all with a protein source.   Activity restrictions: none.   Recommended patient be sure to get at least 70 grams of protein per day by eating small, frequent meals all with high lean protein choices. Be sure to limit/cut back on daily carbohydrate intake. Discussed with the patient the recommended amount of water per day to intake- half of body weight in ounces. Reviewed vitamin requirements. Be sure to do routine exercise, 150 minutes per week minimum, including both cardio and strength training.     Instructions / Recommendations: dietary counseling recommended, recommended a daily protein intake of  grams, vitamin supplement(s) recommended, recommended exercising at least 150 minutes per week, behavior modifications recommended and instructed to call the office for concerns, questions, or problems.     The patient was instructed to follow up in 2 months .     Total time spent face to face was 20 minutes and 15 minutes was spent counseling.

## 2020-07-03 LAB — PREALB SERPL-MCNC: 25.5 MG/DL (ref 20–40)

## 2020-07-07 LAB — VIT B1 BLD-SCNC: 188.1 NMOL/L (ref 66.5–200)

## 2020-07-08 LAB
Lab: NORMAL
METHYLMALONATE SERPL-SCNC: 133 NMOL/L (ref 0–378)

## 2020-07-10 ENCOUNTER — TELEPHONE (OUTPATIENT)
Dept: BARIATRICS/WEIGHT MGMT | Facility: CLINIC | Age: 40
End: 2020-07-10

## 2020-07-10 NOTE — TELEPHONE ENCOUNTER
Spoke to pt regarding lab results. Pt gave a verbal understanding.         ----- Message from ARIANA Singh sent at 7/9/2020  3:49 PM EDT -----  These look good!

## 2020-08-07 RX ORDER — PANTOPRAZOLE SODIUM 40 MG/1
40 TABLET, DELAYED RELEASE ORAL DAILY
Qty: 30 TABLET | Refills: 5 | Status: SHIPPED | OUTPATIENT
Start: 2020-08-07 | End: 2021-03-08

## 2020-11-06 ENCOUNTER — OFFICE VISIT (OUTPATIENT)
Dept: BARIATRICS/WEIGHT MGMT | Facility: CLINIC | Age: 40
End: 2020-11-06

## 2020-11-06 VITALS
SYSTOLIC BLOOD PRESSURE: 129 MMHG | DIASTOLIC BLOOD PRESSURE: 84 MMHG | HEIGHT: 66 IN | TEMPERATURE: 98 F | HEART RATE: 64 BPM | BODY MASS INDEX: 40.02 KG/M2 | WEIGHT: 249 LBS | RESPIRATION RATE: 18 BRPM

## 2020-11-06 DIAGNOSIS — Z71.3 DIETARY COUNSELING: ICD-10-CM

## 2020-11-06 DIAGNOSIS — E66.9 OBESITY, CLASS II, BMI 35-39.9: Primary | ICD-10-CM

## 2020-11-06 DIAGNOSIS — I10 ESSENTIAL HYPERTENSION: ICD-10-CM

## 2020-11-06 DIAGNOSIS — Z98.84 S/P LAPAROSCOPIC SLEEVE GASTRECTOMY: ICD-10-CM

## 2020-11-06 DIAGNOSIS — R53.82 CHRONIC FATIGUE: ICD-10-CM

## 2020-11-06 DIAGNOSIS — E28.2 PCOS (POLYCYSTIC OVARIAN SYNDROME): ICD-10-CM

## 2020-11-06 DIAGNOSIS — E55.9 VITAMIN D DEFICIENCY: ICD-10-CM

## 2020-11-06 DIAGNOSIS — R11.0 NAUSEA: ICD-10-CM

## 2020-11-06 PROBLEM — E66.812 OBESITY, CLASS II, BMI 35-39.9: Status: ACTIVE | Noted: 2020-11-06

## 2020-11-06 PROBLEM — E66.01 OBESITY, CLASS III, BMI 40-49.9 (MORBID OBESITY): Status: RESOLVED | Noted: 2019-01-09 | Resolved: 2020-11-06

## 2020-11-06 PROBLEM — E66.813 OBESITY, CLASS III, BMI 40-49.9 (MORBID OBESITY): Status: RESOLVED | Noted: 2019-01-09 | Resolved: 2020-11-06

## 2020-11-06 PROCEDURE — 99214 OFFICE O/P EST MOD 30 MIN: CPT | Performed by: NURSE PRACTITIONER

## 2020-11-06 RX ORDER — CEFUROXIME AXETIL 250 MG/1
0.5 TABLET ORAL DAILY
COMMUNITY
Start: 2020-09-21 | End: 2021-04-21 | Stop reason: ALTCHOICE

## 2020-11-06 RX ORDER — PROPRANOLOL HYDROCHLORIDE 20 MG/1
1 TABLET ORAL DAILY
COMMUNITY
Start: 2020-11-03 | End: 2022-08-09

## 2020-11-06 RX ORDER — FLUOXETINE HYDROCHLORIDE 20 MG/1
40 CAPSULE ORAL DAILY
COMMUNITY
Start: 2020-10-11

## 2020-11-06 NOTE — PROGRESS NOTES
[facility] BARIATRIC SURGERY  [address] [address]  [phone]  [address] [address]  [phone]  Dept: [phone]  11/6/2020      Lizette Connolly.  [id number]  [id number]  1980  female      Chief Complaint   Patient presents with   • Follow-up     6 month sleeve follow up       BH Post-Op Bariatric Surgery:   Lizette Connolly is status post Laparoscopic Sleeve procedure, performed on 5/20/20     HPI:   Today's weight is 113 kg (249 lb) pounds, today's BMI is Body mass index is 39.78 kg/m²., she has a  loss of 38 pounds since the last visit and@ weight loss since surgery is 83 pounds. The patient reports a decreased portion size and loss of appetite.      Lizette Connolly reports intermittent she is associating with dairy- specifically milk     Diet and Exercise: Diet history reviewed and discussed with the patient. Weight loss/gains to date discussed with the patient. The patient states they are eating 60-70 grams of protein per day. She reports eating 4 meals per day, a typical portion size of 1/2 cup, eating 2 snacks per day, drinking 5-6 or more 8-oz. glasses of water per day, no carbonated beverage consumption and exercising regularly- walking and cardio    Supplements: patchMD     Review of Systems   Constitutional: Positive for appetite change. Negative for fatigue and unexpected weight change.   HENT: Negative.    Eyes: Negative.    Respiratory: Negative.    Cardiovascular: Negative.  Negative for leg swelling.   Gastrointestinal: Positive for nausea (with dairy). Negative for abdominal distention, abdominal pain, constipation, diarrhea and vomiting.   Genitourinary: Negative for difficulty urinating, frequency and urgency.   Musculoskeletal: Negative for back pain.   Skin: Negative.    Psychiatric/Behavioral: Negative.    All other systems reviewed and are negative.      Patient Active Problem List    Diagnosis   • Chronic fatigue   • GERD (gastroesophageal reflux disease)   • Anxiety and depression   • Essential hypertension   • Vitamin D deficiency   • PCOS (polycystic ovarian syndrome)   • Hypertriglyceridemia   • Migraine   • Dietary counseling   • Snoring   • S/P laparoscopic sleeve gastrectomy   • Obesity, Class II, BMI 35-39.9   • Nausea       Past Medical History:   Diagnosis Date   • Anxiety and depression    • GERD (gastroesophageal reflux disease)    • Hip pain 01/07/2019    RIGHT   • HPV in female    • Hypertension    • Kidney stones 01/07/2019   • Migraine    • PCOS (polycystic ovarian syndrome)    • PONV (postoperative nausea and vomiting)    • Sleep apnea     DX IN MARCH, HAS NOT GOTTEN CPAP YET       The following portions of the patient's history were reviewed and updated as appropriate: allergies, current medications, past family history, past medical history, past social history, past surgical history and problem list.    Vitals:    11/06/20 0957   BP: 129/84   Pulse: 64   Resp: 18   Temp: 98 °F (36.7 °C)       Physical Exam  Vitals signs and nursing note reviewed.   Constitutional:       Appearance: She is well-developed.   Neck:      Thyroid: No thyromegaly.   Cardiovascular:      Rate and Rhythm: Normal rate and regular rhythm.      Heart sounds: Normal heart sounds.   Pulmonary:      Effort: Pulmonary effort is normal. No respiratory distress.      Breath sounds: Normal breath sounds. No wheezing.   Abdominal:      General: Bowel sounds are normal. There is no distension.      Palpations: Abdomen is soft.      Tenderness: There is no abdominal tenderness. There is no guarding.      Hernia: No hernia is present.   Musculoskeletal:         General: No tenderness.   Skin:     General: Skin is warm and dry.      Findings: No erythema or rash.   Neurological:      Mental Status: She is alert.   Psychiatric:         Behavior: Behavior normal.         Assessment:   Post-op, the patient is doing  well.     Encounter Diagnoses   Name Primary?   • Obesity, Class II, BMI 35-39.9 Yes   • S/P laparoscopic sleeve gastrectomy    • Nausea    • Essential hypertension    • Vitamin D deficiency    • PCOS (polycystic ovarian syndrome)    • Chronic fatigue    • Dietary counseling        Plan:   Patient was encouraged to start adding Metamucil capsules or powder to her daily regimen starting at 1 teaspoon twice a day or 2 capsules twice a day between meals.  It sounds as if she is experiencing some dumping syndrome related to thin diabetic such as milk.  She can tolerate yogurt and cottage cheese without much problem.  This will likely slow down her absorption of lactose through the small intestinal soluble and should give this more time for proper digestion which helped.  I did suggest you have your life high-protein milks as a protein supplement as she has not been able to do the Premier protein and milk-based protein shakes to help her supplement.  She was encouraged to keep up the great work with her meal frequency and her exercise.  I did encourage her to consider focusing a little bit more on strength and resistance training in the next 3 months plan for cardio when she has the time for exercise and as she does report some hair loss and is likely having some muscle wasting along with that.  He did switch to patch MD on her vitamins and about 4 months ago but we did get a check and set of lab work when she was last here and everything looked great.  We will consider trending these when she is back.  Encouraged patient to be sure to get plenty of lean protein per day through small frequent meals all with a protein source.   Activity restrictions: none.   Recommended patient be sure to get at least 70 grams of protein per day by eating small, frequent meals all with high lean protein choices. Be sure to limit/cut back on daily carbohydrate intake. Discussed with the patient the recommended amount of water per day to  intake- half of body weight in ounces. Reviewed vitamin requirements. Be sure to do routine exercise, 150 minutes per week minimum, including both cardio and strength training.     Instructions / Recommendations: dietary counseling recommended, recommended a daily protein intake of  grams, vitamin supplement(s) recommended, recommended exercising at least 150 minutes per week, behavior modifications recommended and instructed to call the office for concerns, questions, or problems.     The patient was instructed to follow up in 3 months .      Total time spent face to face was 25 minutes and 20 minutes was spent counseling.

## 2021-03-08 RX ORDER — PANTOPRAZOLE SODIUM 40 MG/1
TABLET, DELAYED RELEASE ORAL
Qty: 30 TABLET | Refills: 4 | Status: SHIPPED | OUTPATIENT
Start: 2021-03-08 | End: 2021-04-21

## 2021-04-16 ENCOUNTER — BULK ORDERING (OUTPATIENT)
Dept: CASE MANAGEMENT | Facility: OTHER | Age: 41
End: 2021-04-16

## 2021-04-16 DIAGNOSIS — Z23 IMMUNIZATION DUE: ICD-10-CM

## 2021-04-21 ENCOUNTER — OFFICE VISIT (OUTPATIENT)
Dept: BARIATRICS/WEIGHT MGMT | Facility: CLINIC | Age: 41
End: 2021-04-21

## 2021-04-21 VITALS
SYSTOLIC BLOOD PRESSURE: 151 MMHG | RESPIRATION RATE: 18 BRPM | BODY MASS INDEX: 37.77 KG/M2 | DIASTOLIC BLOOD PRESSURE: 88 MMHG | HEIGHT: 66 IN | TEMPERATURE: 97.7 F | HEART RATE: 49 BPM | WEIGHT: 235 LBS

## 2021-04-21 DIAGNOSIS — I10 ESSENTIAL HYPERTENSION: ICD-10-CM

## 2021-04-21 DIAGNOSIS — E28.2 PCOS (POLYCYSTIC OVARIAN SYNDROME): ICD-10-CM

## 2021-04-21 DIAGNOSIS — R11.0 NAUSEA: ICD-10-CM

## 2021-04-21 DIAGNOSIS — K21.9 GASTROESOPHAGEAL REFLUX DISEASE WITHOUT ESOPHAGITIS: ICD-10-CM

## 2021-04-21 DIAGNOSIS — R53.82 CHRONIC FATIGUE: ICD-10-CM

## 2021-04-21 DIAGNOSIS — E66.9 OBESITY, CLASS II, BMI 35-39.9: Primary | ICD-10-CM

## 2021-04-21 DIAGNOSIS — Z98.84 S/P LAPAROSCOPIC SLEEVE GASTRECTOMY: ICD-10-CM

## 2021-04-21 PROCEDURE — 99214 OFFICE O/P EST MOD 30 MIN: CPT | Performed by: NURSE PRACTITIONER

## 2021-04-21 RX ORDER — METOCLOPRAMIDE 5 MG/1
5 TABLET ORAL
Qty: 80 TABLET | Refills: 0 | Status: SHIPPED | OUTPATIENT
Start: 2021-04-21 | End: 2021-05-11

## 2021-04-21 RX ORDER — SUMATRIPTAN 100 MG/1
1 TABLET, FILM COATED ORAL AS NEEDED
COMMUNITY
Start: 2021-02-12

## 2021-04-21 RX ORDER — PANTOPRAZOLE SODIUM 20 MG/1
20 TABLET, DELAYED RELEASE ORAL DAILY
Qty: 90 TABLET | Refills: 0 | Status: SHIPPED | OUTPATIENT
Start: 2021-04-21 | End: 2021-07-27

## 2021-04-21 NOTE — PROGRESS NOTES
[facility] BARIATRIC [facility] BARIATRIC SURGERY  [address] [facility] [address]  [facility] [address]  [phone]  [address] [address]  [phone]  Dept: [phone]  4/21/2021      Lizette Connolly.  [id number]  [id number]  1980  female      Chief Complaint   Patient presents with   • Follow-up     11 month sleeve       BH Post-Op Bariatric Surgery:   Lizette Connolly is status post Laparoscopic Sleeve procedure, performed on 5/20/20     HPI:   Today's weight is 107 kg (235 lb) pounds, today's BMI is Body mass index is 37.54 kg/m².,@ has a  loss of 14 pounds since the last visit and@ weight loss since surgery is 97 pounds. The patient reports a decreased portion size and loss of appetite.      Lizette Connolly denies nausea, vomiting, reflux and reports that she is doing well but having increased incidence of headaches which are being managed by her PCP.     Diet and Exercise: Diet history reviewed and discussed with the patient. Weight loss/gains to date discussed with the patient. The patient states they are eating 60 grams of protein per day. She reports eating 3 meals per day, a typical portion size of 1/2 cup, eating 1-2 snacks per day, drinking 5-6 or more 8-oz. glasses of water per day, no carbonated beverage consumption and exercising regularly.     She is still leaning on the protein shakes for most of her supplementation due to lack of appetite associated with uncontrolled nausea. She reports that nothing makes nausea better or worse. She does report vomiting every other day or so. She denies association with any food, sometimes with liquids, sometimes solids. Wakes up every morning with nausea. Zofran is the only relieving factor.     Supplements: PatchMD bariatric patch     Review of Systems   Constitutional: Positive for appetite change. Negative for fatigue and unexpected weight change.   HENT: Negative.    Eyes: Negative.    Respiratory:  Negative.    Cardiovascular: Negative.  Negative for leg swelling.   Gastrointestinal: Negative for abdominal distention, abdominal pain, constipation, diarrhea, nausea and vomiting.   Genitourinary: Negative for difficulty urinating, frequency and urgency.   Musculoskeletal: Negative for back pain.   Skin: Negative.    Psychiatric/Behavioral: Negative.    All other systems reviewed and are negative.      Patient Active Problem List   Diagnosis   • Chronic fatigue   • GERD (gastroesophageal reflux disease)   • Anxiety and depression   • Essential hypertension   • Vitamin D deficiency   • PCOS (polycystic ovarian syndrome)   • Hypertriglyceridemia   • Migraine   • Dietary counseling   • Snoring   • S/P laparoscopic sleeve gastrectomy   • Obesity, Class II, BMI 35-39.9   • Nausea       Past Medical History:   Diagnosis Date   • Anxiety and depression    • GERD (gastroesophageal reflux disease)    • Hip pain 01/07/2019    RIGHT   • HPV in female    • Hypertension    • Kidney stones 01/07/2019   • Migraine    • PCOS (polycystic ovarian syndrome)    • PONV (postoperative nausea and vomiting)    • Sleep apnea     DX IN MARCH, HAS NOT GOTTEN CPAP YET       The following portions of the patient's history were reviewed and updated as appropriate: allergies, current medications, past family history, past medical history, past social history, past surgical history and problem list.    Vitals:    04/21/21 1514   BP: 151/88   Pulse: (!) 49   Resp: 18   Temp: 97.7 °F (36.5 °C)       Physical Exam  Vitals and nursing note reviewed.   Constitutional:       Appearance: She is well-developed.   Neck:      Thyroid: No thyromegaly.   Cardiovascular:      Rate and Rhythm: Normal rate and regular rhythm.      Heart sounds: Normal heart sounds.   Pulmonary:      Effort: Pulmonary effort is normal. No respiratory distress.      Breath sounds: Normal breath sounds. No wheezing.   Abdominal:      General: Bowel sounds are normal. There is no  distension.      Palpations: Abdomen is soft.      Tenderness: There is no abdominal tenderness. There is no guarding.      Hernia: No hernia is present.   Musculoskeletal:         General: No tenderness.   Skin:     General: Skin is warm and dry.      Findings: No erythema or rash.   Neurological:      Mental Status: She is alert.   Psychiatric:         Behavior: Behavior normal.         Assessment:   Post-op, the patient is struggling with uncontrolled nausea, vomiting.     Encounter Diagnoses   Name Primary?   • Obesity, Class II, BMI 35-39.9 Yes   • S/P laparoscopic sleeve gastrectomy    • PCOS (polycystic ovarian syndrome)    • Gastroesophageal reflux disease without esophagitis    • Chronic fatigue    • Essential hypertension        Plan:   She will start reglan 4 times daily. We did discuss EGD with diliation of her pylorus in detail including risks not limited to bleeding perforation cardiac arrest or reaction to anesthetic.  We will check vitamin levels.  With the frequency that she is vomiting I am curious about her thiamine levels so we will check B vitamins, iron prealbumin to evaluate nutritional status.  She did have lab work via her PCP a couple of months ago her CMP and was within normal limits her vitamin D was mildly low so she is increased her over-the-counter dosing of vitamin D.  Encouraged patient to be sure to get plenty of lean protein per day through small frequent meals all with a protein source.   Activity restrictions: none.   Recommended patient be sure to get at least 70 grams of protein per day by eating small, frequent meals all with high lean protein choices. Be sure to limit/cut back on daily carbohydrate intake. Discussed with the patient the recommended amount of water per day to intake- half of body weight in ounces. Reviewed vitamin requirements. Be sure to do routine exercise, 150 minutes per week minimum, including both cardio and strength training.     Instructions /  Recommendations: dietary counseling recommended, recommended a daily protein intake of  grams, vitamin supplement(s) recommended, recommended exercising at least 150 minutes per week, behavior modifications recommended and instructed to call the office for concerns, questions, or problems.     The patient was instructed to follow up in 6 months .      Total time spent during this encounter today was 35 minutes

## 2021-04-22 ENCOUNTER — TRANSCRIBE ORDERS (OUTPATIENT)
Dept: SLEEP MEDICINE | Facility: HOSPITAL | Age: 41
End: 2021-04-22

## 2021-04-22 DIAGNOSIS — Z01.818 OTHER SPECIFIED PRE-OPERATIVE EXAMINATION: Primary | ICD-10-CM

## 2021-04-24 ENCOUNTER — LAB (OUTPATIENT)
Dept: LAB | Facility: HOSPITAL | Age: 41
End: 2021-04-24

## 2021-04-24 DIAGNOSIS — Z01.818 OTHER SPECIFIED PRE-OPERATIVE EXAMINATION: ICD-10-CM

## 2021-04-24 PROCEDURE — C9803 HOPD COVID-19 SPEC COLLECT: HCPCS

## 2021-04-24 PROCEDURE — U0004 COV-19 TEST NON-CDC HGH THRU: HCPCS

## 2021-04-26 ENCOUNTER — ANESTHESIA EVENT (OUTPATIENT)
Dept: GASTROENTEROLOGY | Facility: HOSPITAL | Age: 41
End: 2021-04-26

## 2021-04-26 LAB — SARS-COV-2 RNA RESP QL NAA+PROBE: NOT DETECTED

## 2021-04-27 ENCOUNTER — HOSPITAL ENCOUNTER (OUTPATIENT)
Facility: HOSPITAL | Age: 41
Setting detail: HOSPITAL OUTPATIENT SURGERY
Discharge: HOME | End: 2021-04-27
Attending: SURGERY | Admitting: SURGERY

## 2021-04-27 ENCOUNTER — ANESTHESIA (OUTPATIENT)
Dept: GASTROENTEROLOGY | Facility: HOSPITAL | Age: 41
End: 2021-04-27

## 2021-04-27 VITALS
TEMPERATURE: 97.5 F | BODY MASS INDEX: 37.78 KG/M2 | HEART RATE: 49 BPM | RESPIRATION RATE: 16 BRPM | WEIGHT: 240.7 LBS | SYSTOLIC BLOOD PRESSURE: 152 MMHG | HEIGHT: 67 IN | DIASTOLIC BLOOD PRESSURE: 86 MMHG | OXYGEN SATURATION: 100 %

## 2021-04-27 DIAGNOSIS — E28.2 PCOS (POLYCYSTIC OVARIAN SYNDROME): ICD-10-CM

## 2021-04-27 DIAGNOSIS — I10 ESSENTIAL HYPERTENSION: ICD-10-CM

## 2021-04-27 DIAGNOSIS — Z98.84 S/P LAPAROSCOPIC SLEEVE GASTRECTOMY: ICD-10-CM

## 2021-04-27 DIAGNOSIS — R53.82 CHRONIC FATIGUE: ICD-10-CM

## 2021-04-27 DIAGNOSIS — E66.9 OBESITY, CLASS II, BMI 35-39.9: ICD-10-CM

## 2021-04-27 DIAGNOSIS — R11.0 NAUSEA: ICD-10-CM

## 2021-04-27 DIAGNOSIS — K21.9 GASTROESOPHAGEAL REFLUX DISEASE WITHOUT ESOPHAGITIS: ICD-10-CM

## 2021-04-27 PROCEDURE — 43245 EGD DILATE STRICTURE: CPT | Performed by: SURGERY

## 2021-04-27 PROCEDURE — 25010000002 PROPOFOL 10 MG/ML EMULSION: Performed by: ANESTHESIOLOGY

## 2021-04-27 PROCEDURE — 81025 URINE PREGNANCY TEST: CPT | Performed by: SURGERY

## 2021-04-27 PROCEDURE — C1726 CATH, BAL DIL, NON-VASCULAR: HCPCS | Performed by: SURGERY

## 2021-04-27 PROCEDURE — 87081 CULTURE SCREEN ONLY: CPT | Performed by: SURGERY

## 2021-04-27 RX ORDER — LIDOCAINE HYDROCHLORIDE 20 MG/ML
INJECTION, SOLUTION INFILTRATION; PERINEURAL AS NEEDED
Status: DISCONTINUED | OUTPATIENT
Start: 2021-04-27 | End: 2021-04-27 | Stop reason: SURG

## 2021-04-27 RX ORDER — PROPOFOL 10 MG/ML
VIAL (ML) INTRAVENOUS AS NEEDED
Status: DISCONTINUED | OUTPATIENT
Start: 2021-04-27 | End: 2021-04-27 | Stop reason: SURG

## 2021-04-27 RX ORDER — SODIUM CHLORIDE 0.9 % (FLUSH) 0.9 %
10 SYRINGE (ML) INJECTION AS NEEDED
Status: DISCONTINUED | OUTPATIENT
Start: 2021-04-27 | End: 2021-04-27 | Stop reason: HOSPADM

## 2021-04-27 RX ORDER — SODIUM CHLORIDE, SODIUM LACTATE, POTASSIUM CHLORIDE, CALCIUM CHLORIDE 600; 310; 30; 20 MG/100ML; MG/100ML; MG/100ML; MG/100ML
1000 INJECTION, SOLUTION INTRAVENOUS CONTINUOUS
Status: DISCONTINUED | OUTPATIENT
Start: 2021-04-27 | End: 2021-04-27 | Stop reason: HOSPADM

## 2021-04-27 RX ORDER — LIDOCAINE HYDROCHLORIDE 10 MG/ML
0.5 INJECTION, SOLUTION INFILTRATION; PERINEURAL ONCE AS NEEDED
Status: DISCONTINUED | OUTPATIENT
Start: 2021-04-27 | End: 2021-04-27 | Stop reason: HOSPADM

## 2021-04-27 RX ADMIN — SODIUM CHLORIDE, POTASSIUM CHLORIDE, SODIUM LACTATE AND CALCIUM CHLORIDE 1000 ML: 600; 310; 30; 20 INJECTION, SOLUTION INTRAVENOUS at 10:54

## 2021-04-27 RX ADMIN — PROPOFOL 200 MG: 10 INJECTION, EMULSION INTRAVENOUS at 11:26

## 2021-04-27 RX ADMIN — LIDOCAINE HYDROCHLORIDE 100 MG: 20 INJECTION, SOLUTION INFILTRATION; PERINEURAL at 11:26

## 2021-04-27 NOTE — ANESTHESIA PREPROCEDURE EVALUATION
Anesthesia Evaluation     Patient summary reviewed and Nursing notes reviewed   history of anesthetic complications: PONV  NPO Solid Status: > 8 hours  NPO Liquid Status: > 8 hours           Airway   Mallampati: II  Neck ROM: full  No difficulty expected  Dental - normal exam     Pulmonary     breath sounds clear to auscultation  (+) sleep apnea,   Cardiovascular     Rhythm: regular    (+) hypertension, hyperlipidemia,       Neuro/Psych  (+) headaches, psychiatric history Anxiety and Depression,     GI/Hepatic/Renal/Endo    (+) obesity,  GERD,  renal disease,     Musculoskeletal     Abdominal   (+) obese,    Substance History      OB/GYN          Other                        Anesthesia Plan    ASA 3     MAC     intravenous induction     Anesthetic plan, all risks, benefits, and alternatives have been provided, discussed and informed consent has been obtained with: patient.

## 2021-04-27 NOTE — ANESTHESIA POSTPROCEDURE EVALUATION
"Patient: Lizette Connolly    Procedure Summary     Date: 04/27/21 [facility]    Anesthesia Start: 1122 Anesthesia Stop: 1134    Procedure: ESOPHAGOGASTRODUODENOSCOPY WITH BALLOON DILATATION 18-20MM, BIOPSIES (N/A Esophagus) Diagnosis:       Obesity, Class II, BMI 35-39.9      S/P laparoscopic sleeve gastrectomy      PCOS (polycystic ovarian syndrome)      Gastroesophageal reflux disease without esophagitis      Chronic fatigue      Essential hypertension      Nausea      (Obesity, Class II, BMI 35-39.9 [E66.9])      (S/P laparoscopic sleeve gastrectomy [Z98.84])      (PCOS (polycystic ovarian syndrome) [E28.2])      (Gastroesophageal reflux disease without esophagitis [K21.9])      (Chronic fatigue [R53.82])      (Essential hypertension [I10])      (Nausea [R11.0])    Surgeons: Shady Betancourt Jr., MD Provider: Jesse Canales MD    Anesthesia Type: MAC ASA Status: 3          Anesthesia Type: MAC    Vitals  Vitals Value Taken Time   /86 04/27/21 1150   Temp     Pulse 49 04/27/21 1150   Resp 16 04/27/21 1150   SpO2 100 % 04/27/21 1150           Post Anesthesia Care and Evaluation    Patient location during evaluation: bedside  Patient participation: complete - patient participated  Level of consciousness: sleepy but conscious  Pain score: 0  Pain management: adequate  Airway patency: patent  Anesthetic complications: No anesthetic complications    Cardiovascular status: acceptable  Respiratory status: acceptable  Hydration status: acceptable    Comments: /86   Pulse (!) 49   Temp 36.4 °C (97.5 °F) (Oral)   Resp 16   Ht 170.2 cm (67\")   Wt 109 kg (240 lb 11.2 oz)   SpO2 100%   BMI 37.70 kg/m²         "

## 2021-04-28 LAB — UREASE TISS QL: POSITIVE

## 2021-04-29 ENCOUNTER — TELEPHONE (OUTPATIENT)
Dept: BARIATRICS/WEIGHT MGMT | Facility: CLINIC | Age: 41
End: 2021-04-29

## 2021-04-29 NOTE — TELEPHONE ENCOUNTER
LVM for patient to call the office regarding test results.        ----- Message from Shady Betancourt Jr., MD sent at 4/29/2021  6:16 AM EDT -----  Please call the patient regarding her abnormal result.

## 2021-04-30 DIAGNOSIS — B96.81 HELICOBACTER PYLORI GASTRITIS: Primary | ICD-10-CM

## 2021-04-30 DIAGNOSIS — K29.70 HELICOBACTER PYLORI GASTRITIS: Primary | ICD-10-CM

## 2021-04-30 RX ORDER — CLARITHROMYCIN 500 MG/1
500 TABLET, COATED ORAL 2 TIMES DAILY
Qty: 28 TABLET | Refills: 0 | Status: SHIPPED | OUTPATIENT
Start: 2021-04-30 | End: 2021-05-14

## 2021-04-30 RX ORDER — AMOXICILLIN 500 MG/1
1000 CAPSULE ORAL 2 TIMES DAILY
Qty: 56 CAPSULE | Refills: 0 | Status: SHIPPED | OUTPATIENT
Start: 2021-04-30 | End: 2022-04-22

## 2021-04-30 NOTE — TELEPHONE ENCOUNTER
Patient had EGD with balloon dilatation on 4/27/21. Patient states she was told to contact us if she is still having nausea. Please Advise

## 2021-06-03 ENCOUNTER — LAB (OUTPATIENT)
Dept: LAB | Facility: HOSPITAL | Age: 41
End: 2021-06-03

## 2021-06-03 DIAGNOSIS — E66.9 OBESITY, CLASS II, BMI 35-39.9: ICD-10-CM

## 2021-06-03 DIAGNOSIS — K21.9 GASTROESOPHAGEAL REFLUX DISEASE WITHOUT ESOPHAGITIS: ICD-10-CM

## 2021-06-03 DIAGNOSIS — R11.0 NAUSEA: ICD-10-CM

## 2021-06-03 DIAGNOSIS — R53.82 CHRONIC FATIGUE: ICD-10-CM

## 2021-06-03 DIAGNOSIS — B96.81 HELICOBACTER PYLORI GASTRITIS: ICD-10-CM

## 2021-06-03 DIAGNOSIS — E28.2 PCOS (POLYCYSTIC OVARIAN SYNDROME): ICD-10-CM

## 2021-06-03 DIAGNOSIS — Z98.84 S/P LAPAROSCOPIC SLEEVE GASTRECTOMY: ICD-10-CM

## 2021-06-03 DIAGNOSIS — K29.70 HELICOBACTER PYLORI GASTRITIS: ICD-10-CM

## 2021-06-03 DIAGNOSIS — I10 ESSENTIAL HYPERTENSION: ICD-10-CM

## 2021-06-03 LAB
BASOPHILS # BLD AUTO: 0.06 10*3/MM3 (ref 0–0.2)
BASOPHILS NFR BLD AUTO: 1 % (ref 0–1.5)
DEPRECATED RDW RBC AUTO: 40.8 FL (ref 37–54)
EOSINOPHIL # BLD AUTO: 0.19 10*3/MM3 (ref 0–0.4)
EOSINOPHIL NFR BLD AUTO: 3.1 % (ref 0.3–6.2)
ERYTHROCYTE [DISTWIDTH] IN BLOOD BY AUTOMATED COUNT: 12.1 % (ref 12.3–15.4)
FERRITIN SERPL-MCNC: 158 NG/ML (ref 13–150)
HCT VFR BLD AUTO: 43.1 % (ref 34–46.6)
HGB BLD-MCNC: 14.1 G/DL (ref 12–15.9)
IMM GRANULOCYTES # BLD AUTO: 0.02 10*3/MM3 (ref 0–0.05)
IMM GRANULOCYTES NFR BLD AUTO: 0.3 % (ref 0–0.5)
IRON 24H UR-MRATE: 121 MCG/DL (ref 37–145)
LYMPHOCYTES # BLD AUTO: 1.78 10*3/MM3 (ref 0.7–3.1)
LYMPHOCYTES NFR BLD AUTO: 29.3 % (ref 19.6–45.3)
MCH RBC QN AUTO: 30 PG (ref 26.6–33)
MCHC RBC AUTO-ENTMCNC: 32.7 G/DL (ref 31.5–35.7)
MCV RBC AUTO: 91.7 FL (ref 79–97)
MONOCYTES # BLD AUTO: 0.39 10*3/MM3 (ref 0.1–0.9)
MONOCYTES NFR BLD AUTO: 6.4 % (ref 5–12)
NEUTROPHILS NFR BLD AUTO: 3.63 10*3/MM3 (ref 1.7–7)
NEUTROPHILS NFR BLD AUTO: 59.9 % (ref 42.7–76)
NRBC BLD AUTO-RTO: 0 /100 WBC (ref 0–0.2)
PLATELET # BLD AUTO: 245 10*3/MM3 (ref 140–450)
PMV BLD AUTO: 10.4 FL (ref 6–12)
PREALB SERPL-MCNC: 23.5 MG/DL (ref 20–40)
RBC # BLD AUTO: 4.7 10*6/MM3 (ref 3.77–5.28)
WBC # BLD AUTO: 6.07 10*3/MM3 (ref 3.4–10.8)

## 2021-06-03 PROCEDURE — 84134 ASSAY OF PREALBUMIN: CPT

## 2021-06-03 PROCEDURE — 83540 ASSAY OF IRON: CPT

## 2021-06-03 PROCEDURE — 82728 ASSAY OF FERRITIN: CPT

## 2021-06-03 PROCEDURE — 83921 ORGANIC ACID SINGLE QUANT: CPT

## 2021-06-03 PROCEDURE — 36415 COLL VENOUS BLD VENIPUNCTURE: CPT

## 2021-06-03 PROCEDURE — 84425 ASSAY OF VITAMIN B-1: CPT

## 2021-06-03 PROCEDURE — 85025 COMPLETE CBC W/AUTO DIFF WBC: CPT

## 2021-06-08 LAB — VIT B1 BLD-SCNC: 113.3 NMOL/L (ref 66.5–200)

## 2021-06-10 LAB
Lab: NORMAL
METHYLMALONATE SERPL-SCNC: 218 NMOL/L (ref 0–378)

## 2021-07-06 ENCOUNTER — LAB (OUTPATIENT)
Dept: LAB | Facility: HOSPITAL | Age: 41
End: 2021-07-06

## 2021-07-06 ENCOUNTER — TRANSCRIBE ORDERS (OUTPATIENT)
Dept: ADMINISTRATIVE | Facility: HOSPITAL | Age: 41
End: 2021-07-06

## 2021-07-06 DIAGNOSIS — K29.70 GASTROESOPHAGITIS: Primary | ICD-10-CM

## 2021-07-06 DIAGNOSIS — K20.90 GASTROESOPHAGITIS: Primary | ICD-10-CM

## 2021-07-06 DIAGNOSIS — K29.70 GASTROESOPHAGITIS: ICD-10-CM

## 2021-07-06 DIAGNOSIS — K20.90 GASTROESOPHAGITIS: ICD-10-CM

## 2021-07-06 PROCEDURE — 83013 H PYLORI (C-13) BREATH: CPT

## 2021-07-07 LAB — UREA BREATH TEST QL: NEGATIVE

## 2021-07-27 RX ORDER — PANTOPRAZOLE SODIUM 20 MG/1
TABLET, DELAYED RELEASE ORAL
Qty: 90 TABLET | Refills: 0 | Status: SHIPPED | OUTPATIENT
Start: 2021-07-27 | End: 2021-09-01

## 2021-09-01 RX ORDER — PANTOPRAZOLE SODIUM 40 MG/1
TABLET, DELAYED RELEASE ORAL
Qty: 30 TABLET | Refills: 4 | Status: SHIPPED | OUTPATIENT
Start: 2021-09-01 | End: 2021-10-21

## 2021-09-01 NOTE — TELEPHONE ENCOUNTER
Please advise! Because 4/21/2021 you discontinued this medication to patient because therapy completed and renewing may not be appropriate.

## 2021-09-15 ENCOUNTER — TELEPHONE (OUTPATIENT)
Dept: BARIATRICS/WEIGHT MGMT | Facility: CLINIC | Age: 41
End: 2021-09-15

## 2021-09-15 NOTE — TELEPHONE ENCOUNTER
Leave message informing about results    ----- Message from ARIANA Singh sent at 9/15/2021 10:09 AM EDT -----  Vitamin levels all appear stable. No anemia

## 2021-10-21 ENCOUNTER — OFFICE VISIT (OUTPATIENT)
Dept: BARIATRICS/WEIGHT MGMT | Facility: CLINIC | Age: 41
End: 2021-10-21

## 2021-10-21 VITALS
BODY MASS INDEX: 38.09 KG/M2 | WEIGHT: 237 LBS | TEMPERATURE: 97.5 F | RESPIRATION RATE: 18 BRPM | HEART RATE: 73 BPM | HEIGHT: 66 IN | SYSTOLIC BLOOD PRESSURE: 120 MMHG | DIASTOLIC BLOOD PRESSURE: 81 MMHG

## 2021-10-21 DIAGNOSIS — E66.9 OBESITY, CLASS II, BMI 35-39.9: Primary | ICD-10-CM

## 2021-10-21 DIAGNOSIS — K21.9 GASTROESOPHAGEAL REFLUX DISEASE WITHOUT ESOPHAGITIS: ICD-10-CM

## 2021-10-21 DIAGNOSIS — R13.10 DYSPHAGIA, UNSPECIFIED TYPE: ICD-10-CM

## 2021-10-21 DIAGNOSIS — R53.82 CHRONIC FATIGUE: ICD-10-CM

## 2021-10-21 DIAGNOSIS — E28.2 PCOS (POLYCYSTIC OVARIAN SYNDROME): ICD-10-CM

## 2021-10-21 DIAGNOSIS — I10 ESSENTIAL HYPERTENSION: ICD-10-CM

## 2021-10-21 DIAGNOSIS — F32.A ANXIETY AND DEPRESSION: ICD-10-CM

## 2021-10-21 DIAGNOSIS — F41.9 ANXIETY AND DEPRESSION: ICD-10-CM

## 2021-10-21 DIAGNOSIS — Z98.84 S/P LAPAROSCOPIC SLEEVE GASTRECTOMY: ICD-10-CM

## 2021-10-21 PROCEDURE — 99213 OFFICE O/P EST LOW 20 MIN: CPT | Performed by: NURSE PRACTITIONER

## 2021-10-21 RX ORDER — PANTOPRAZOLE SODIUM 20 MG/1
1 TABLET, DELAYED RELEASE ORAL DAILY
COMMUNITY
Start: 2021-10-13 | End: 2021-10-21

## 2021-10-21 RX ORDER — PANTOPRAZOLE SODIUM 20 MG/1
20 TABLET, DELAYED RELEASE ORAL 2 TIMES DAILY
Qty: 90 TABLET | Refills: 0 | Status: SHIPPED | OUTPATIENT
Start: 2021-10-21 | End: 2021-12-10

## 2021-10-21 NOTE — PROGRESS NOTES
[facility] BARIATR[facility] BARIATRIC SURGERY  [address] [facility] [address]  [facility] [address]  [phone]  [address] [address]  [phone]  Dept: [phone]  10/21/2021      Lizette Connolly.  [id number]  [id number]  1980  female      Chief Complaint   Patient presents with   • Follow-up     1year & 5 month sleeve follow up       BH Post-Op Bariatric Surgery:   Lizette Connolly is status post Laparoscopic Sleeve procedure, performed on 5/20/20     HPI:   Today's weight is 108 kg (237 lb) pounds, today's BMI is Body mass index is 37.86 kg/m².,@ has a  gain of 2 pounds since the last visit and@ weight loss since surgery is 22 pounds. The patient reports a decreased portion size and loss of appetite.      Lizette Connolly and reports increased prevalence of heartburn throughout the day despite 20mg prilosec daily as well as she is tolerating a smaller portion of chicken or more dense meats per meal. She is still tolerating chicken, but only moist and shredded and notes markedly more heartburn. She denies frothing or reflux. She is usually getting a shake or two daily. She may have a shake for two meals 3 or so days per week. She still has a breakfast shake on her days off and otherwise gets real food. She reports intermittent nausea.      Diet and Exercise: Diet history reviewed and discussed with the patient. Weight loss/gains to date discussed with the patient. The patient states they are eating 60-70 grams of protein per day. She reports eating 4 meals per day, a typical portion size of 1/2 cup, eating 1 snacks per day, drinking 5-6 or more 8-oz. glasses of water per day, no carbonated beverage consumption and exercising regularly.     Supplements: Patch MD- bariatric patches.     Review of Systems   Constitutional: Positive for fatigue. Negative for appetite change, fever and unexpected weight change.   HENT: Negative.    Eyes: Negative.     Respiratory: Negative.    Cardiovascular: Negative.  Negative for leg swelling.   Gastrointestinal: Positive for abdominal pain and diarrhea. Negative for abdominal distention, constipation, nausea and vomiting.   Genitourinary: Negative for difficulty urinating, frequency and urgency.   Musculoskeletal: Negative for back pain.   Skin: Positive for wound.   Neurological: Positive for weakness (generalized).   Psychiatric/Behavioral: Negative.    All other systems reviewed and are negative.      Patient Active Problem List   Diagnosis   • Chronic fatigue   • GERD (gastroesophageal reflux disease)   • Anxiety and depression   • Essential hypertension   • Vitamin D deficiency   • PCOS (polycystic ovarian syndrome)   • Hypertriglyceridemia   • Migraine   • Dietary counseling   • Snoring   • S/P laparoscopic sleeve gastrectomy   • Obesity, Class II, BMI 35-39.9   • Nausea   • Gastroesophageal reflux disease without esophagitis   • Dysphagia       Past Medical History:   Diagnosis Date   • Anxiety and depression    • GERD (gastroesophageal reflux disease)    • Hip pain 01/07/2019    RIGHT   • HPV in female    • Hypertension    • Kidney stones 01/07/2019   • Migraine    • PCOS (polycystic ovarian syndrome)    • PONV (postoperative nausea and vomiting)    • Sleep apnea     DX IN MARCH, HAS NOT GOTTEN CPAP YET       The following portions of the patient's history were reviewed and updated as appropriate: allergies, current medications, past family history, past medical history, past social history, past surgical history and problem list.    Vitals:    10/21/21 0851   BP: 120/81   Pulse: 73   Resp: 18   Temp: 97.5 °F (36.4 °C)       Physical Exam  Vitals reviewed.   Constitutional:       Appearance: Normal appearance. She is obese.   HENT:      Head: Normocephalic and atraumatic.   Cardiovascular:      Rate and Rhythm: Normal rate and regular rhythm.      Heart sounds: Normal heart sounds, S1 normal and S2 normal. No murmur  heard.      Pulmonary:      Effort: Pulmonary effort is normal.      Breath sounds: Normal breath sounds.   Abdominal:      General: There is distension (mild- directly above midline left incision at level of internal sutures).      Palpations: Abdomen is soft.      Tenderness: There is abdominal tenderness (mild, expected, with movement). There is no guarding or rebound.      Hernia: No hernia is present. There is no hernia in the umbilical area or ventral area.   Skin:     General: Skin is warm and dry.      Findings: Bruising and erythema present.      Comments: Wound edges are well approximated incisions appear closed with Dermabond intact.  Mild circumferential erythema outlined by light yellow ecchymosis noted.     Mild tenderness to palpation as expected.   Neurological:      Mental Status: She is alert.         Assessment:   Post-op, the patient is doing well.     Encounter Diagnoses   Name Primary?   • Obesity, Class II, BMI 35-39.9 Yes   • PCOS (polycystic ovarian syndrome)    • S/P laparoscopic sleeve gastrectomy    • Gastroesophageal reflux disease without esophagitis    • Anxiety and depression    • Chronic fatigue    • Essential hypertension    • Dysphagia, unspecified type        Plan:   We will plan on EGD with dilatation as patient is presenting with progressive dysphagia and worsening heartburn. Will increase her protonix dose to BID to help protect her esophagus. We will check annual lab work today including CBC, CMP, vitamin levels and prealbumin today.  Encouraged patient to be sure to get plenty of lean protein per day through small frequent meals all with a protein source.   Activity restrictions: none.   Recommended patient be sure to get at least 70 grams of protein per day by eating small, frequent meals all with high lean protein choices. Be sure to limit/cut back on daily carbohydrate intake. Discussed with the patient the recommended amount of water per day to intake- half of body weight  in ounces. Reviewed vitamin requirements. Be sure to do routine exercise, 150 minutes per week minimum, including both cardio and strength training.     Instructions / Recommendations: dietary counseling recommended, recommended a daily protein intake of  grams, vitamin supplement(s) recommended, recommended exercising at least 150 minutes per week, behavior modifications recommended and instructed to call the office for concerns, questions, or problems.     The patient was instructed to follow up in 3 months .     . Total time spent during this encounter today was 25 minutes

## 2021-11-11 ENCOUNTER — TRANSCRIBE ORDERS (OUTPATIENT)
Dept: BARIATRICS/WEIGHT MGMT | Facility: CLINIC | Age: 41
End: 2021-11-11

## 2021-11-11 DIAGNOSIS — Z01.818 OTHER SPECIFIED PRE-OPERATIVE EXAMINATION: Primary | ICD-10-CM

## 2021-11-13 ENCOUNTER — LAB (OUTPATIENT)
Dept: LAB | Facility: HOSPITAL | Age: 41
End: 2021-11-13

## 2021-11-13 DIAGNOSIS — Z01.818 OTHER SPECIFIED PRE-OPERATIVE EXAMINATION: ICD-10-CM

## 2021-11-13 LAB — SARS-COV-2 ORF1AB RESP QL NAA+PROBE: NOT DETECTED

## 2021-11-13 PROCEDURE — U0004 COV-19 TEST NON-CDC HGH THRU: HCPCS

## 2021-11-13 PROCEDURE — C9803 HOPD COVID-19 SPEC COLLECT: HCPCS

## 2021-11-16 ENCOUNTER — ANESTHESIA EVENT (OUTPATIENT)
Dept: GASTROENTEROLOGY | Facility: HOSPITAL | Age: 41
End: 2021-11-16

## 2021-11-16 ENCOUNTER — HOSPITAL ENCOUNTER (OUTPATIENT)
Facility: HOSPITAL | Age: 41
Setting detail: HOSPITAL OUTPATIENT SURGERY
Discharge: HOME | End: 2021-11-16
Attending: SURGERY | Admitting: SURGERY

## 2021-11-16 ENCOUNTER — ANESTHESIA (OUTPATIENT)
Dept: GASTROENTEROLOGY | Facility: HOSPITAL | Age: 41
End: 2021-11-16

## 2021-11-16 VITALS
SYSTOLIC BLOOD PRESSURE: 101 MMHG | HEIGHT: 67 IN | DIASTOLIC BLOOD PRESSURE: 68 MMHG | RESPIRATION RATE: 16 BRPM | HEART RATE: 54 BPM | WEIGHT: 240.3 LBS | BODY MASS INDEX: 37.72 KG/M2 | OXYGEN SATURATION: 96 %

## 2021-11-16 DIAGNOSIS — E66.9 OBESITY, CLASS II, BMI 35-39.9: ICD-10-CM

## 2021-11-16 DIAGNOSIS — R13.10 DYSPHAGIA, UNSPECIFIED TYPE: ICD-10-CM

## 2021-11-16 LAB
B-HCG UR QL: NEGATIVE
EXPIRATION DATE: NORMAL
INTERNAL NEGATIVE CONTROL: NEGATIVE
INTERNAL POSITIVE CONTROL: POSITIVE
Lab: NORMAL

## 2021-11-16 PROCEDURE — C1726 CATH, BAL DIL, NON-VASCULAR: HCPCS | Performed by: SURGERY

## 2021-11-16 PROCEDURE — 43239 EGD BIOPSY SINGLE/MULTIPLE: CPT | Performed by: SURGERY

## 2021-11-16 PROCEDURE — 81025 URINE PREGNANCY TEST: CPT | Performed by: SURGERY

## 2021-11-16 PROCEDURE — 87081 CULTURE SCREEN ONLY: CPT | Performed by: SURGERY

## 2021-11-16 PROCEDURE — 43245 EGD DILATE STRICTURE: CPT | Performed by: SURGERY

## 2021-11-16 PROCEDURE — 25010000002 PROPOFOL 10 MG/ML EMULSION: Performed by: ANESTHESIOLOGY

## 2021-11-16 RX ORDER — PROPOFOL 10 MG/ML
VIAL (ML) INTRAVENOUS AS NEEDED
Status: DISCONTINUED | OUTPATIENT
Start: 2021-11-16 | End: 2021-11-16 | Stop reason: SURG

## 2021-11-16 RX ORDER — SODIUM CHLORIDE, SODIUM LACTATE, POTASSIUM CHLORIDE, CALCIUM CHLORIDE 600; 310; 30; 20 MG/100ML; MG/100ML; MG/100ML; MG/100ML
1000 INJECTION, SOLUTION INTRAVENOUS CONTINUOUS
Status: DISCONTINUED | OUTPATIENT
Start: 2021-11-16 | End: 2021-11-16 | Stop reason: HOSPADM

## 2021-11-16 RX ORDER — LIDOCAINE HYDROCHLORIDE 20 MG/ML
INJECTION, SOLUTION INFILTRATION; PERINEURAL AS NEEDED
Status: DISCONTINUED | OUTPATIENT
Start: 2021-11-16 | End: 2021-11-16 | Stop reason: SURG

## 2021-11-16 RX ADMIN — SODIUM CHLORIDE, POTASSIUM CHLORIDE, SODIUM LACTATE AND CALCIUM CHLORIDE 1000 ML: 600; 310; 30; 20 INJECTION, SOLUTION INTRAVENOUS at 10:50

## 2021-11-16 RX ADMIN — PROPOFOL 200 MG: 10 INJECTION, EMULSION INTRAVENOUS at 11:22

## 2021-11-16 RX ADMIN — LIDOCAINE HYDROCHLORIDE 60 MG: 20 INJECTION, SOLUTION INFILTRATION; PERINEURAL at 11:22

## 2021-11-16 NOTE — ANESTHESIA POSTPROCEDURE EVALUATION
"Patient: Lizette Connolly    Procedure Summary     Date: 11/16/21 Room / Location:  [facility]    Anesthesia Start: 1120 Anesthesia Stop: 1131    Procedure: ESOPHAGOGASTRODUODENOSCOPY WITH PYLORIC DILATATION, BIOPSIES (N/A Esophagus) Diagnosis:       Obesity, Class II, BMI 35-39.9      Dysphagia, unspecified type      (Obesity, Class II, BMI 35-39.9 [E66.9])      (Dysphagia, unspecified type [R13.10])    Surgeons: Shady Betancourt Jr., MD Provider: Александр Dickey MD    Anesthesia Type: MAC ASA Status: 3          Anesthesia Type: MAC    Vitals  Vitals Value Taken Time   /68 11/16/21 1146   Temp     Pulse 54 11/16/21 1146   Resp 16 11/16/21 1146   SpO2 96 % 11/16/21 1146           Post Anesthesia Care and Evaluation    Patient location during evaluation: PACU  Patient participation: complete - patient participated  Level of consciousness: awake  Pain score: 0  Pain management: adequate  Airway patency: patent  Anesthetic complications: No anesthetic complications  PONV Status: none  Cardiovascular status: acceptable  Respiratory status: acceptable  Hydration status: acceptable    Comments: /68 (BP Location: Left arm, Patient Position: Sitting)   Pulse 54   Resp 16   Ht 170.2 cm (67\")   Wt 109 kg (240 lb 4.8 oz)   SpO2 96%   BMI 37.64 kg/m²       "

## 2021-11-16 NOTE — ANESTHESIA PREPROCEDURE EVALUATION
Anesthesia Evaluation     Patient summary reviewed and Nursing notes reviewed   history of anesthetic complications: PONV               Airway   Mallampati: I  TM distance: >3 FB  Neck ROM: full  No difficulty expected  Dental - normal exam     Pulmonary - normal exam   (+) sleep apnea,   Cardiovascular - normal exam    (+) hypertension, hyperlipidemia,       Neuro/Psych  (+) headaches, psychiatric history Anxiety and Depression,     GI/Hepatic/Renal/Endo    (+)  GERD,  renal disease stones,     Musculoskeletal (-) negative ROS    Abdominal  - normal exam    Bowel sounds: normal.   Substance History - negative use     OB/GYN negative ob/gyn ROS         Other                        Anesthesia Plan    ASA 3     MAC       Anesthetic plan, all risks, benefits, and alternatives have been provided, discussed and informed consent has been obtained with: patient.

## 2021-11-16 NOTE — OP NOTE
Surgeon: Shady Betancourt Jr., M.D.    Preoperative Diagnosis: #1 Gastrosoft reflux disease #2 intermittent dysphagia with solids status post laparoscopic sleeve gastrectomy    Postoperative Diagnosis: Gastritis    Procedure Performed: Transoral esophagogastroduodenoscopy with 18-20 balloon dilatation of pylorus    Indications: 41-year-old female status post laparoscopic sleeve gastrectomy over a year ago with complaints of heartburn.  Symptoms have improved with Protonix.  Also with intermittent dysphagia with solids.    Procedure:     The procedure, indications, preparation and potential, patient were explained to the patient, who indicated understanding and signed the corresponding consent forms.  The patient was identified, taken to the endoscopy suite, and placed on the left side down decubitus position.  The patient underwent a MAC anesthesia and was appropriately monitored through the case by the anesthesia personnel using continuous pulse oximetry, blood pressure, and cardiac monitoring.  A bite block was placed.  After adequate IV sedation and using a transoral technique a lubed flexible endoscope was placed in the hypopharynx and advanced to the second portion of the duodenum.  The pylorus was mildly strictured and the scope had to be advanced with some pressure into the duodenum.  The scope was then withdrawn back into the gastric sleeve.  There was no stricture noted in the gastric sleeve unless dictated below.  No polyps or ulcers were seen unless dictated below.  The scope was then withdrawn back into the esophagus.  The Z line was regular and no erosive esophagitis seen unless dictated below.  Scope was then advanced back down into the antrum.  A 18-20 balloon catheter was then advanced across the pylorus and taken up in sequence and each level held for approximately 1 minute.  The catheter was deflated and removed.  The pylorus dilated up nicely.  The scope was then completely withdrawn after  decompressing the stomach.  The patient tolerated the procedure well and left the endoscopy suite in stable condition.    The sleeve was of normal size without stricture or dilatation.  The pylorus opened up nicely after dilatation.  Cold forcep biopsies of the antrum taken to rule out Helicobacter pylori.  Patchy erythema noted in the antrum.    Recommendations:     Await biopsy results and follow-up in the office

## 2021-11-17 LAB — UREASE TISS QL: NEGATIVE

## 2021-11-18 ENCOUNTER — TELEPHONE (OUTPATIENT)
Dept: BARIATRICS/WEIGHT MGMT | Facility: CLINIC | Age: 41
End: 2021-11-18

## 2021-11-18 NOTE — TELEPHONE ENCOUNTER
Leave message Informing about results EGD   ----- Message from Shady Betancourt Jr., MD sent at 11/17/2021  1:59 PM EST -----  Please call patient with negative results.

## 2021-12-02 RX ORDER — MIRTAZAPINE 7.5 MG/1
7.5 TABLET, FILM COATED ORAL NIGHTLY
Qty: 30 TABLET | Refills: 0 | Status: SHIPPED | OUTPATIENT
Start: 2021-12-02 | End: 2021-12-29

## 2021-12-10 RX ORDER — PANTOPRAZOLE SODIUM 20 MG/1
TABLET, DELAYED RELEASE ORAL
Qty: 90 TABLET | Refills: 0 | Status: SHIPPED | OUTPATIENT
Start: 2021-12-10 | End: 2022-01-24

## 2021-12-29 RX ORDER — MIRTAZAPINE 7.5 MG/1
TABLET, FILM COATED ORAL
Qty: 30 TABLET | Refills: 0 | Status: SHIPPED | OUTPATIENT
Start: 2021-12-29 | End: 2022-01-31

## 2022-01-10 RX ORDER — PANTOPRAZOLE SODIUM 20 MG/1
TABLET, DELAYED RELEASE ORAL
Qty: 90 TABLET | Refills: 0 | OUTPATIENT
Start: 2022-01-10

## 2022-01-24 RX ORDER — PANTOPRAZOLE SODIUM 20 MG/1
TABLET, DELAYED RELEASE ORAL
Qty: 60 TABLET | Refills: 0 | Status: SHIPPED | OUTPATIENT
Start: 2022-01-24 | End: 2022-02-23

## 2022-01-31 RX ORDER — MIRTAZAPINE 7.5 MG/1
TABLET, FILM COATED ORAL
Qty: 30 TABLET | Refills: 0 | Status: SHIPPED | OUTPATIENT
Start: 2022-01-31 | End: 2022-03-01

## 2022-02-23 RX ORDER — PANTOPRAZOLE SODIUM 20 MG/1
TABLET, DELAYED RELEASE ORAL
Qty: 60 TABLET | Refills: 0 | Status: SHIPPED | OUTPATIENT
Start: 2022-02-23 | End: 2022-03-24

## 2022-03-01 RX ORDER — MIRTAZAPINE 7.5 MG/1
TABLET, FILM COATED ORAL
Qty: 90 TABLET | Refills: 0 | Status: SHIPPED | OUTPATIENT
Start: 2022-03-01 | End: 2022-05-18

## 2022-03-23 DIAGNOSIS — K52.9 COLITIS: Primary | ICD-10-CM

## 2022-03-24 RX ORDER — PANTOPRAZOLE SODIUM 20 MG/1
TABLET, DELAYED RELEASE ORAL
Qty: 60 TABLET | Refills: 0 | Status: SHIPPED | OUTPATIENT
Start: 2022-03-24 | End: 2022-04-25

## 2022-04-22 ENCOUNTER — OFFICE VISIT (OUTPATIENT)
Dept: GASTROENTEROLOGY | Facility: CLINIC | Age: 42
End: 2022-04-22

## 2022-04-22 ENCOUNTER — TELEPHONE (OUTPATIENT)
Dept: GASTROENTEROLOGY | Facility: CLINIC | Age: 42
End: 2022-04-22

## 2022-04-22 VITALS
SYSTOLIC BLOOD PRESSURE: 105 MMHG | BODY MASS INDEX: 37.83 KG/M2 | TEMPERATURE: 97.5 F | HEIGHT: 67 IN | DIASTOLIC BLOOD PRESSURE: 68 MMHG | WEIGHT: 241 LBS

## 2022-04-22 DIAGNOSIS — R93.3 ABNORMAL CT SCAN, COLON: Primary | ICD-10-CM

## 2022-04-22 DIAGNOSIS — R10.31 RIGHT LOWER QUADRANT ABDOMINAL PAIN: ICD-10-CM

## 2022-04-22 PROCEDURE — 99214 OFFICE O/P EST MOD 30 MIN: CPT | Performed by: NURSE PRACTITIONER

## 2022-04-22 RX ORDER — DICYCLOMINE HYDROCHLORIDE 10 MG/1
10 CAPSULE ORAL 3 TIMES DAILY PRN
Qty: 60 CAPSULE | Refills: 3 | Status: SHIPPED | OUTPATIENT
Start: 2022-04-22 | End: 2022-07-13

## 2022-04-22 NOTE — PROGRESS NOTES
Chief Complaint   Patient presents with   • Abdominal Pain       HPI    Lizette Connolly is a  41 y.o. female here with a history of cholecystectomy to establish care as a new patient for complaints of abdominal pain.    This patient will also follow with Dr. Cordoba.    Patient was diagnosed with colitis after ER evaluation in February [facility].  She was treated with 10 days of Cipro and Flagyl.    Reviewed enhanced CT A/P as follows: Mild wall thickening and inflammation of the descending colon.  Diverticulosis.  Renal calculi.  Hemogram and LFTs was normal.    Patient reports ongoing symptoms of right-sided abdominal pain since her gastric sleeve in 2020.  She follows with Dr. Betancourt.  Dr. Betancourt has done multiple EGDs for her with the last one being fall 2021.  She has been treated with twice daily dosing pantoprazole and has also been treated for H. pylori.  She tells me follow-up H. pylori testing was negative.  Right-sided pain is located in the right lower quadrant.  She takes stool softeners 2 tablets once daily to treat mild constipation.  No rectal bleeding.  Pain comes and goes.  She has not tried antispasmodics.  Some nausea in relationship to gastric sleeve but no vomiting.  Appetite is good.  Her current BMI is 37.75.    She is never had a colonoscopy.    No family history of colon cancer.    Past Medical History:   Diagnosis Date   • Anxiety and depression    • GERD (gastroesophageal reflux disease)    • Hip pain 01/07/2019    RIGHT   • HPV in female    • Hypertension    • Kidney stones 01/07/2019   • Migraine    • PCOS (polycystic ovarian syndrome)    • PONV (postoperative nausea and vomiting)    • Sleep apnea     DX IN MARCH, HAS NOT GOTTEN CPAP YET       Past Surgical History:   Procedure Laterality Date   • ENDOSCOPY  2010   • ENDOSCOPY N/A 3/3/2020    Procedure: ESOPHAGOGASTRODUODENOSCOPY WITH BIOPSY;  Surgeon: Shady Betancourt Jr., MD;  Location: [facility] ENDOSCOPY;  Service: General;   Laterality: N/A;  PRE- GERD  POST- GASTRITIS   • ENDOSCOPY N/A 4/27/2021    Procedure: ESOPHAGOGASTRODUODENOSCOPY WITH BALLOON DILATATION 18-20MM, BIOPSIES;  Surgeon: Shady Betancourt Jr., MD;  Location: [facility] ENDOSCOPY;  Service: General;  Laterality: N/A;  PRE-H/O SLEEVE, NAUSEA  POST- GASTRITIS   • ENDOSCOPY N/A 11/16/2021    Procedure: ESOPHAGOGASTRODUODENOSCOPY WITH PYLORIC DILATATION, BIOPSIES;  Surgeon: Shady Betancourt Jr., MD;  Location: [facility] ENDOSCOPY;  Service: General;  Laterality: N/A;  PRE- GERD, H/O SLEEVE  POST- GASTRITIS   • GASTRIC SLEEVE LAPAROSCOPIC N/A 5/20/2020    Procedure: GASTRIC SLEEVE LAPAROSCOPIC;  Surgeon: Shady Betancourt Jr., MD;  [place] [facility] [place] [facility] [place];  Service: Bariatric;  Laterality: N/A;   • LAPAROSCOPIC CHOLECYSTECTOMY  2010   • MANDIBLE SURGERY     • ORIF ANKLE FRACTURE Right        Scheduled Meds:     Continuous Infusions: No current facility-administered medications for this visit.      PRN Meds:     No Known Allergies    Social History     Socioeconomic History   • Marital status:    • Number of children: 3   Tobacco Use   • Smoking status: Never Smoker   • Smokeless tobacco: Never Used   Vaping Use   • Vaping Use: Never used   Substance and Sexual Activity   • Alcohol use: Not Currently     Comment: OCCASSIONALLY   • Drug use: No   • Sexual activity: Defer     Birth control/protection: I.U.D.       Family History   Problem Relation Age of Onset   • Breast cancer Maternal Grandmother    • Hypertension Maternal Grandmother    • Cancer Maternal Grandmother         BREAST & BONE   • Obesity Mother    • Diabetes Mother    • Hypertension Mother    • Heart attack Mother    • Heart disease Mother    • Sleep apnea Mother    • Hypertension Father    • Heart disease Brother    • Hypertension Maternal Grandfather    • Heart disease Maternal Grandfather    • Cancer Maternal Grandfather    • Hypertension Paternal Grandmother    • Multiple sclerosis Paternal Grandmother     • Hypertension Paternal Grandfather    • Cancer Paternal Grandfather         LUNG   • Malig Hyperthermia Neg Hx        Review of Systems   Constitutional: Negative for activity change, appetite change, fatigue and unexpected weight change.   HENT: Negative for trouble swallowing.    Eyes: Negative.    Respiratory: Negative.    Cardiovascular: Negative.    Gastrointestinal: Positive for abdominal pain. Negative for abdominal distention, anal bleeding, blood in stool, constipation, diarrhea, nausea, rectal pain and vomiting.   Endocrine: Negative.    Genitourinary: Negative.    Musculoskeletal: Negative.    Allergic/Immunologic: Negative.    Neurological: Negative.    Hematological: Negative.    Psychiatric/Behavioral: Negative.        Vitals:    04/22/22 0929   BP: 105/68   Temp: 97.5 °F (36.4 °C)       Physical Exam  Constitutional:       Appearance: She is well-developed.   Abdominal:      General: Bowel sounds are normal. There is no distension.      Palpations: Abdomen is soft. There is no mass.      Tenderness: There is abdominal tenderness. There is no guarding.      Hernia: No hernia is present.   Skin:     General: Skin is warm and dry.      Capillary Refill: Capillary refill takes less than 2 seconds.   Neurological:      Mental Status: She is alert and oriented to person, place, and time.   Psychiatric:         Behavior: Behavior normal.     Assessment    Diagnoses and all orders for this visit:    1. Abnormal CT scan, colon (Primary)  -     Case Request; Standing  -     Case Request    2. Right lower quadrant abdominal pain  -     Case Request; Standing  -     Case Request    Other orders  -     dicyclomine (Bentyl) 10 MG capsule; Take 1 capsule by mouth 3 (Three) Times a Day As Needed (Abdominal discomfort).  Dispense: 60 capsule; Refill: 3       Plan    Arrange colonoscopy with Dr. Cordoba to further evaluate right-sided abdominal pain and assess abnormality seen on prior CT.  Continue stool softeners  in the interim.  Recommend trial of Bentyl as above  Follow-up and further recommendations pending the aforementioned work-up.         ARIANA Ma  [facility]  [address] [place] [address]  Office: [phone]

## 2022-04-22 NOTE — TELEPHONE ENCOUNTER
PARUL Moss for colonoscopy on 06/17/2022  arrive at 1pm   . Gave Prep instructions in office. ----miralax      Advised PT  that  will call with final arrival time  24 hrs before procedure. If they do not get a phone call, arrival time will stay the same as given on instructions

## 2022-04-25 RX ORDER — PANTOPRAZOLE SODIUM 20 MG/1
TABLET, DELAYED RELEASE ORAL
Qty: 60 TABLET | Refills: 0 | Status: SHIPPED | OUTPATIENT
Start: 2022-04-25 | End: 2022-06-07

## 2022-05-18 RX ORDER — MIRTAZAPINE 7.5 MG/1
TABLET, FILM COATED ORAL
Qty: 90 TABLET | Refills: 0 | Status: ON HOLD | OUTPATIENT
Start: 2022-05-18 | End: 2022-08-15

## 2022-05-20 ENCOUNTER — TELEPHONE (OUTPATIENT)
Dept: GASTROENTEROLOGY | Facility: CLINIC | Age: 42
End: 2022-05-20

## 2022-05-25 ENCOUNTER — TELEPHONE (OUTPATIENT)
Dept: GASTROENTEROLOGY | Facility: CLINIC | Age: 42
End: 2022-05-25

## 2022-05-25 NOTE — TELEPHONE ENCOUNTER
PARUL Moss for colonoscopy on 08/10 arrive at 1230pm . Gave Prep instructions in office. ----miralax

## 2022-06-07 RX ORDER — PANTOPRAZOLE SODIUM 20 MG/1
TABLET, DELAYED RELEASE ORAL
Qty: 60 TABLET | Refills: 0 | Status: SHIPPED | OUTPATIENT
Start: 2022-06-07 | End: 2022-07-05

## 2022-07-05 RX ORDER — PANTOPRAZOLE SODIUM 20 MG/1
TABLET, DELAYED RELEASE ORAL
Qty: 60 TABLET | Refills: 0 | Status: SHIPPED | OUTPATIENT
Start: 2022-07-05 | End: 2022-08-03

## 2022-07-13 RX ORDER — DICYCLOMINE HYDROCHLORIDE 10 MG/1
CAPSULE ORAL
Qty: 60 CAPSULE | Refills: 3 | Status: SHIPPED | OUTPATIENT
Start: 2022-07-13 | End: 2022-10-27

## 2022-08-03 RX ORDER — PANTOPRAZOLE SODIUM 20 MG/1
TABLET, DELAYED RELEASE ORAL
Qty: 60 TABLET | Refills: 0 | Status: SHIPPED | OUTPATIENT
Start: 2022-08-03 | End: 2022-09-23

## 2022-08-09 RX ORDER — FREMANEZUMAB-VFRM 225 MG/1.5ML
225 INJECTION SUBCUTANEOUS
COMMUNITY
Start: 2022-01-25

## 2022-08-10 ENCOUNTER — ANESTHESIA EVENT (OUTPATIENT)
Dept: GASTROENTEROLOGY | Facility: HOSPITAL | Age: 42
End: 2022-08-10

## 2022-08-10 ENCOUNTER — ANESTHESIA (OUTPATIENT)
Dept: GASTROENTEROLOGY | Facility: HOSPITAL | Age: 42
End: 2022-08-10

## 2022-08-10 ENCOUNTER — HOSPITAL ENCOUNTER (OUTPATIENT)
Facility: HOSPITAL | Age: 42
Setting detail: HOSPITAL OUTPATIENT SURGERY
Discharge: HOME | End: 2022-08-10
Attending: INTERNAL MEDICINE | Admitting: INTERNAL MEDICINE

## 2022-08-10 VITALS
WEIGHT: 244 LBS | DIASTOLIC BLOOD PRESSURE: 72 MMHG | HEART RATE: 61 BPM | HEIGHT: 67 IN | RESPIRATION RATE: 10 BRPM | OXYGEN SATURATION: 100 % | SYSTOLIC BLOOD PRESSURE: 106 MMHG | TEMPERATURE: 97.8 F | BODY MASS INDEX: 38.3 KG/M2

## 2022-08-10 DIAGNOSIS — R93.3 ABNORMAL CT SCAN, COLON: ICD-10-CM

## 2022-08-10 DIAGNOSIS — R10.31 RIGHT LOWER QUADRANT ABDOMINAL PAIN: ICD-10-CM

## 2022-08-10 PROCEDURE — 25010000002 PROPOFOL 10 MG/ML EMULSION: Performed by: STUDENT IN AN ORGANIZED HEALTH CARE EDUCATION/TRAINING PROGRAM

## 2022-08-10 PROCEDURE — 45380 COLONOSCOPY AND BIOPSY: CPT | Performed by: INTERNAL MEDICINE

## 2022-08-10 PROCEDURE — S0260 H&P FOR SURGERY: HCPCS | Performed by: INTERNAL MEDICINE

## 2022-08-10 PROCEDURE — 88305 TISSUE EXAM BY PATHOLOGIST: CPT | Performed by: INTERNAL MEDICINE

## 2022-08-10 RX ORDER — SODIUM CHLORIDE 0.9 % (FLUSH) 0.9 %
10 SYRINGE (ML) INJECTION AS NEEDED
Status: DISCONTINUED | OUTPATIENT
Start: 2022-08-10 | End: 2022-08-17 | Stop reason: HOSPADM

## 2022-08-10 RX ORDER — LIDOCAINE HYDROCHLORIDE 20 MG/ML
INJECTION, SOLUTION INFILTRATION; PERINEURAL AS NEEDED
Status: DISCONTINUED | OUTPATIENT
Start: 2022-08-10 | End: 2022-08-10 | Stop reason: SURG

## 2022-08-10 RX ORDER — SODIUM CHLORIDE, SODIUM LACTATE, POTASSIUM CHLORIDE, CALCIUM CHLORIDE 600; 310; 30; 20 MG/100ML; MG/100ML; MG/100ML; MG/100ML
30 INJECTION, SOLUTION INTRAVENOUS CONTINUOUS PRN
Status: DISCONTINUED | OUTPATIENT
Start: 2022-08-10 | End: 2022-08-17 | Stop reason: HOSPADM

## 2022-08-10 RX ORDER — PROPOFOL 10 MG/ML
VIAL (ML) INTRAVENOUS CONTINUOUS PRN
Status: DISCONTINUED | OUTPATIENT
Start: 2022-08-10 | End: 2022-08-10 | Stop reason: SURG

## 2022-08-10 RX ORDER — PROPOFOL 10 MG/ML
VIAL (ML) INTRAVENOUS AS NEEDED
Status: DISCONTINUED | OUTPATIENT
Start: 2022-08-10 | End: 2022-08-10 | Stop reason: SURG

## 2022-08-10 RX ORDER — SODIUM CHLORIDE 0.9 % (FLUSH) 0.9 %
10 SYRINGE (ML) INJECTION EVERY 12 HOURS SCHEDULED
Status: DISCONTINUED | OUTPATIENT
Start: 2022-08-10 | End: 2022-08-17 | Stop reason: HOSPADM

## 2022-08-10 RX ADMIN — Medication 40 MG: at 13:47

## 2022-08-10 RX ADMIN — LIDOCAINE HYDROCHLORIDE 60 MG: 20 INJECTION, SOLUTION INFILTRATION; PERINEURAL at 13:42

## 2022-08-10 RX ADMIN — Medication 40 MG: at 13:45

## 2022-08-10 RX ADMIN — SODIUM CHLORIDE, POTASSIUM CHLORIDE, SODIUM LACTATE AND CALCIUM CHLORIDE 30 ML/HR: 600; 310; 30; 20 INJECTION, SOLUTION INTRAVENOUS at 13:25

## 2022-08-10 RX ADMIN — Medication 20 MG: at 13:46

## 2022-08-10 RX ADMIN — PROPOFOL 200 MCG/KG/MIN: 10 INJECTION, EMULSION INTRAVENOUS at 13:42

## 2022-08-10 NOTE — H&P
[facility]  Pre Procedure History & Physical    Chief Complaint:   Lower quadrant pain with questionable left colon colitis    Subjective     HPI:   Patient 42-year-old female with history of hypertension, GERD status post gastric sleeve surgery complaining of right lower quadrant pain.  Evaluation in the ER on CT showed some thickening in the left colon here for colonoscopy.    Past Medical History:   Past Medical History:   Diagnosis Date   • Anxiety and depression    • GERD (gastroesophageal reflux disease)    • Hip pain 01/07/2019    RIGHT   • HPV in female    • Hypertension    • Kidney stones 01/07/2019   • Migraine    • PCOS (polycystic ovarian syndrome)    • Sleep apnea     DX IN MARCH, HAS NOT GOTTEN CPAP YET       Past Surgical History:  Past Surgical History:   Procedure Laterality Date   • ENDOSCOPY  2010   • ENDOSCOPY N/A 3/3/2020    Procedure: ESOPHAGOGASTRODUODENOSCOPY WITH BIOPSY;  Surgeon: Shady Betancourt Jr., MD;  Location: [facility] ENDOSCOPY;  Service: General;  Laterality: N/A;  PRE- GERD  POST- GASTRITIS   • ENDOSCOPY N/A 4/27/2021    Procedure: ESOPHAGOGASTRODUODENOSCOPY WITH BALLOON DILATATION 18-20MM, BIOPSIES;  Surgeon: Shady Betancourt Jr., MD;  Location: [facility] ENDOSCOPY;  Service: General;  Laterality: N/A;  PRE-H/O SLEEVE, NAUSEA  POST- GASTRITIS   • ENDOSCOPY N/A 11/16/2021    Procedure: ESOPHAGOGASTRODUODENOSCOPY WITH PYLORIC DILATATION, BIOPSIES;  Surgeon: Shady Betancourt Jr., MD;  Location: [facility] ENDOSCOPY;  Service: General;  Laterality: N/A;  PRE- GERD, H/O SLEEVE  POST- GASTRITIS   • GASTRIC SLEEVE LAPAROSCOPIC N/A 5/20/2020    Procedure: GASTRIC SLEEVE LAPAROSCOPIC;  Surgeon: Shady Betancourt Jr., MD;  [place] [facility] [place] [facility]  Service: Bariatric;  Laterality: N/A;   • LAPAROSCOPIC CHOLECYSTECTOMY  2010   • MANDIBLE SURGERY     • ORIF ANKLE FRACTURE Right        Family History:  Family History   Problem Relation Age of Onset   • Breast cancer  Maternal Grandmother    • Hypertension Maternal Grandmother    • Cancer Maternal Grandmother         BREAST & BONE   • Obesity Mother    • Diabetes Mother    • Hypertension Mother    • Heart attack Mother    • Heart disease Mother    • Sleep apnea Mother    • Hypertension Father    • Heart disease Brother    • Hypertension Maternal Grandfather    • Heart disease Maternal Grandfather    • Cancer Maternal Grandfather    • Hypertension Paternal Grandmother    • Multiple sclerosis Paternal Grandmother    • Hypertension Paternal Grandfather    • Cancer Paternal Grandfather         LUNG   • Malig Hyperthermia Neg Hx        Social History:   reports that she has never smoked. She has never used smokeless tobacco. She reports previous alcohol use. She reports that she does not use drugs.    Medications:   Medications Prior to Admission   Medication Sig Dispense Refill Last Dose   • dicyclomine (BENTYL) 10 MG capsule TAKE ONE CAPSULE BY MOUTH THREE TIMES A DAY AS NEEDED (ABDOMINAL DISCOMFORT). 60 capsule 3 Past Week at Unknown time   • FLUoxetine (PROzac) 20 MG capsule Take 1 capsule by mouth Daily.   8/9/2022 at Unknown time   • Fremanezumab-vfrm (Ajovy) 225 MG/1.5ML solution auto-injector Inject 225 mg under the skin into the appropriate area as directed Every 30 (Thirty) Days.   Past Month at Unknown time   • gabapentin (NEURONTIN) 100 MG capsule Take 100 mg by mouth 3 (Three) Times a Day.   8/9/2022 at Unknown time   • hydrOXYzine (ATARAX) 25 MG tablet Take 25 mg by mouth As Needed.   Past Month at Unknown time   • levonorgestrel (MIRENA) 20 MCG/24HR IUD 1 each by Intrauterine route 1 (One) Time.   8/10/2022 at Unknown time   • lisinopril (PRINIVIL,ZESTRIL) 10 MG tablet Take 10 mg by mouth Daily.   8/9/2022 at Unknown time   • Magnesium 100 MG capsule Take 100 mg by mouth Daily.   8/9/2022 at Unknown time   • mirtazapine (REMERON) 7.5 MG tablet TAKE ONE TABLET BY MOUTH ONCE NIGHTLY 90 tablet 0 8/9/2022 at Unknown time  "  • Omega-3 Fatty Acids (FISH OIL) 1000 MG capsule capsule Take 1,000 mg by mouth Daily With Breakfast. HELD FOR ENDO X 2 WEEKS   7/31/2022   • pantoprazole (PROTONIX) 20 MG EC tablet TAKE ONE TABLET BY MOUTH TWICE A DAY (Patient taking differently: Take 40 mg by mouth 2 (Two) Times a Day.) 60 tablet 0 8/9/2022 at Unknown time   • SUMAtriptan (IMITREX) 100 MG tablet Take 1 tablet by mouth As Needed.   Past Week at Unknown time       Allergies:  Patient has no known allergies.    ROS:    Pertinent items are noted in HPI     Objective     Blood pressure 112/77, pulse 70, temperature 97.8 °F (36.6 °C), temperature source Oral, resp. rate 16, height 170.2 cm (67\"), weight 111 kg (244 lb), SpO2 100 %, not currently breastfeeding.    Physical Exam   Constitutional: Pt is oriented to person, place, and time and well-developed, well-nourished, and in no distress.   Mouth/Throat: Oropharynx is clear and moist.   Neck: Normal range of motion.   Cardiovascular: Normal rate, regular rhythm and normal heart sounds.    Pulmonary/Chest: Effort normal and breath sounds normal.   Abdominal: Soft. Nontender  Skin: Skin is warm and dry.   Psychiatric: Mood, memory, affect and judgment normal.     Assessment & Plan     Diagnosis:  Right lower quadrant pain  Abnormal CT:    Anticipated Surgical Procedure:  Colonoscopy    The risks, benefits, and alternatives of this procedure have been discussed with the patient or the responsible party- the patient understands and agrees to proceed.                                                          "

## 2022-08-10 NOTE — ANESTHESIA POSTPROCEDURE EVALUATION
Patient: Lizette Connolly    Procedure Summary     Date: 08/10/22 Room / Location:  [facility] /  [facility]    Anesthesia Start: 1339 Anesthesia Stop: 1408    Procedure: COLONOSCOPY TO CECUM AND TI wtih biopsies (N/A ) Diagnosis:       Abnormal CT scan, colon      Right lower quadrant abdominal pain      Diverticulosis      Internal hemorrhoids      (Abnormal CT scan, colon [R93.3])      (Right lower quadrant abdominal pain [R10.31])    Surgeons: Cosmo Cordoba MD Provider: Manan Taylor MD    Anesthesia Type: MAC ASA Status: 3          Anesthesia Type: MAC    Vitals  Vitals Value Taken Time   /72 08/10/22 1426   Temp     Pulse 61 08/10/22 1426   Resp 10 08/10/22 1426   SpO2 100 % 08/10/22 1426           Post Anesthesia Care and Evaluation    Patient location during evaluation: bedside  Patient participation: complete - patient participated  Level of consciousness: awake  Pain management: adequate    Airway patency: patent  Anesthetic complications: No anesthetic complications  PONV Status: none  Cardiovascular status: acceptable  Respiratory status: acceptable  Hydration status: acceptable  Post Neuraxial Block status: Motor and sensory function returned to baseline

## 2022-08-10 NOTE — ANESTHESIA PREPROCEDURE EVALUATION
Anesthesia Evaluation     Patient summary reviewed and Nursing notes reviewed   history of anesthetic complications: PONV               Airway   Mallampati: I  TM distance: >3 FB  Neck ROM: full  No difficulty expected  Dental - normal exam     Pulmonary - normal exam   (+) sleep apnea,   Cardiovascular - normal exam  Exercise tolerance: good (4-7 METS)    (+) hypertension, hyperlipidemia,       Neuro/Psych  (+) headaches, psychiatric history Anxiety and Depression,    GI/Hepatic/Renal/Endo    (+) obesity,  GERD,  renal disease stones,     Musculoskeletal (-) negative ROS    Abdominal   (+) obese,     Bowel sounds: normal.   Substance History - negative use     OB/GYN negative ob/gyn ROS         Other                          Anesthesia Plan    ASA 3     MAC     intravenous induction     Anesthetic plan, risks, benefits, and alternatives have been provided, discussed and informed consent has been obtained with: patient.    Plan discussed with CRNA.

## 2022-08-10 NOTE — BRIEF OP NOTE
COLONOSCOPY  Progress Note    Lizette Connolly  8/10/2022    Pre-op Diagnosis:   Abnormal CT scan, colon [R93.3]  Right lower quadrant abdominal pain [R10.31]       Post-Op Diagnosis Codes:     * Abnormal CT scan, colon [R93.3]     * Right lower quadrant abdominal pain [R10.31]     * Diverticulosis [K57.90]     * Internal hemorrhoids [K64.8]    Procedure/CPT® Codes:        Procedure(s):  COLONOSCOPY TO CECUM AND TI wtih biopsies    Surgeon(s):  Cosmo Cordoba MD    Anesthesia: Monitored Anesthesia Care    Staff:   Endo Technician: Whit Vale, RN  Endo Nurse: Stanislav Powers RN         Estimated Blood Loss: minimal    Urine Voided: * No values recorded between 8/10/2022  1:39 PM and 8/10/2022  2:04 PM *    Specimens:                Specimens     ID Source Type Tests Collected By Collected At Frozen?    A Large Intestine, Left / Descending Colon Tissue · TISSUE PATHOLOGY EXAM   Cosmo Cordoba MD 8/10/22 1964     This specimen was not marked as sent.                Drains: * No LDAs found *    Findings: Colonoscopy the terminal ileum with normal mucosa.  Scattered diverticulosis in the transverse and sigmoid colon with internal hemorrhoids seen.  Biopsies of the descending colon taken to rule out microscopic colitis        Complications: None          Cosmo Cordoba MD     Date: 8/10/2022  Time: 14:06 EDT

## 2022-08-10 NOTE — DISCHARGE INSTRUCTIONS

## 2022-08-11 LAB
LAB AP CASE REPORT: NORMAL
PATH REPORT.FINAL DX SPEC: NORMAL
PATH REPORT.GROSS SPEC: NORMAL

## 2022-08-15 RX ORDER — MIRTAZAPINE 7.5 MG/1
TABLET, FILM COATED ORAL
Qty: 90 TABLET | Refills: 0 | Status: SHIPPED | OUTPATIENT
Start: 2022-08-15 | End: 2022-11-21

## 2022-09-02 ENCOUNTER — TELEPHONE (OUTPATIENT)
Dept: GASTROENTEROLOGY | Facility: CLINIC | Age: 42
End: 2022-09-02

## 2022-09-02 NOTE — TELEPHONE ENCOUNTER
Left message on an identifed VM advised the results will also be sent to her my chart. Advised to call back to schedule a follow up visit.

## 2022-09-02 NOTE — TELEPHONE ENCOUNTER
----- Message from Cosmo Cordoba MD sent at 8/24/2022  8:24 AM EDT -----  Biopsies normal.  Pain likely adhesion related with extensive work-up otherwise negative.  Patient can follow-up with me in the office to discuss treatment options.

## 2022-09-23 RX ORDER — PANTOPRAZOLE SODIUM 40 MG/1
TABLET, DELAYED RELEASE ORAL
Qty: 30 TABLET | Refills: 0 | Status: SHIPPED | OUTPATIENT
Start: 2022-09-23 | End: 2022-10-03

## 2022-10-03 RX ORDER — PANTOPRAZOLE SODIUM 20 MG/1
TABLET, DELAYED RELEASE ORAL
Qty: 60 TABLET | Refills: 2 | Status: SHIPPED | OUTPATIENT
Start: 2022-10-03 | End: 2022-10-24

## 2022-10-24 RX ORDER — PANTOPRAZOLE SODIUM 40 MG/1
TABLET, DELAYED RELEASE ORAL
Qty: 30 TABLET | Refills: 2 | Status: SHIPPED | OUTPATIENT
Start: 2022-10-24 | End: 2022-12-29

## 2022-10-25 ENCOUNTER — OFFICE VISIT (OUTPATIENT)
Dept: GASTROENTEROLOGY | Facility: CLINIC | Age: 42
End: 2022-10-25

## 2022-10-25 VITALS
OXYGEN SATURATION: 99 % | TEMPERATURE: 96.4 F | HEART RATE: 76 BPM | DIASTOLIC BLOOD PRESSURE: 86 MMHG | HEIGHT: 67 IN | SYSTOLIC BLOOD PRESSURE: 135 MMHG | WEIGHT: 256.4 LBS | BODY MASS INDEX: 40.24 KG/M2

## 2022-10-25 DIAGNOSIS — R10.31 RIGHT LOWER QUADRANT ABDOMINAL PAIN: Primary | ICD-10-CM

## 2022-10-25 PROCEDURE — 99213 OFFICE O/P EST LOW 20 MIN: CPT | Performed by: INTERNAL MEDICINE

## 2022-10-25 RX ORDER — ONDANSETRON 4 MG/1
TABLET, ORALLY DISINTEGRATING ORAL
COMMUNITY
Start: 2022-10-23 | End: 2023-03-27 | Stop reason: SDUPTHER

## 2022-10-25 NOTE — PROGRESS NOTES
Chief Complaint   Patient presents with   • Abdominal Pain   • Nausea       Lizette Connolly is a  42 y.o. female here for a follow up visit for abdominal pain and nausea.    HPI     Patient 42-year-old female with history of hypertension, kidney stones recurrent abdominal pain.  Patient with no weight loss no fever chills EGD and colonoscopy otherwise unremarkable as to etiology.  Patient is status post gastric sleeve for weight loss.    Past Medical History:   Diagnosis Date   • Anxiety and depression    • GERD (gastroesophageal reflux disease)    • Hip pain 01/07/2019    RIGHT   • HPV in female    • Hypertension    • Kidney stones 01/07/2019   • Lactose intolerance May 2020    After gadtric sleeve   • Migraine    • PCOS (polycystic ovarian syndrome)    • Sleep apnea     DX IN MARCH, HAS NOT GOTTEN CPAP YET         Current Outpatient Medications:   •  dicyclomine (BENTYL) 10 MG capsule, TAKE ONE CAPSULE BY MOUTH THREE TIMES A DAY AS NEEDED (ABDOMINAL DISCOMFORT)., Disp: 60 capsule, Rfl: 3  •  FLUoxetine (PROzac) 20 MG capsule, Take 2 capsules by mouth Daily., Disp: , Rfl:   •  Fremanezumab-vfrm (Ajovy) 225 MG/1.5ML solution auto-injector, Inject 225 mg under the skin into the appropriate area as directed Every 30 (Thirty) Days., Disp: , Rfl:   •  gabapentin (NEURONTIN) 100 MG capsule, Take 100 mg by mouth 3 (Three) Times a Day., Disp: , Rfl:   •  hydrOXYzine (ATARAX) 25 MG tablet, Take 25 mg by mouth As Needed., Disp: , Rfl:   •  levonorgestrel (MIRENA) 20 MCG/24HR IUD, 1 each by Intrauterine route 1 (One) Time., Disp: , Rfl:   •  lisinopril (PRINIVIL,ZESTRIL) 10 MG tablet, Take 10 mg by mouth Daily., Disp: , Rfl:   •  Magnesium 100 MG capsule, Take 100 mg by mouth Daily., Disp: , Rfl:   •  Omega-3 Fatty Acids (FISH OIL) 1000 MG capsule capsule, Take 1,000 mg by mouth Daily With Breakfast. HELD FOR ENDO X 2 WEEKS, Disp: , Rfl:   •  ondansetron ODT (ZOFRAN-ODT) 4 MG disintegrating tablet, , Disp: , Rfl:   •   pantoprazole (PROTONIX) 40 MG EC tablet, TAKE ONE TABLET BY MOUTH DAILY, Disp: 30 tablet, Rfl: 2  •  SUMAtriptan (IMITREX) 100 MG tablet, Take 1 tablet by mouth As Needed., Disp: , Rfl:   •  mirtazapine (REMERON) 7.5 MG tablet, TAKE ONE TABLET BY MOUTH ONCE NIGHTLY, Disp: 90 tablet, Rfl: 0    No Known Allergies    Social History     Socioeconomic History   • Marital status:    • Number of children: 3   Tobacco Use   • Smoking status: Never   • Smokeless tobacco: Never   Vaping Use   • Vaping Use: Never used   Substance and Sexual Activity   • Alcohol use: Not Currently     Comment: OCCASSIONALLY   • Drug use: No   • Sexual activity: Defer     Birth control/protection: I.U.D.       Family History   Problem Relation Age of Onset   • Breast cancer Maternal Grandmother    • Hypertension Maternal Grandmother    • Cancer Maternal Grandmother         BREAST & BONE   • Obesity Mother    • Diabetes Mother    • Hypertension Mother    • Heart attack Mother    • Heart disease Mother    • Sleep apnea Mother    • Hypertension Father    • Heart disease Brother    • Hypertension Maternal Grandfather    • Heart disease Maternal Grandfather    • Cancer Maternal Grandfather    • Hypertension Paternal Grandmother    • Multiple sclerosis Paternal Grandmother    • Hypertension Paternal Grandfather    • Cancer Paternal Grandfather         LUNG   • Malig Hyperthermia Neg Hx        Review of Systems   Constitutional: Negative.    Respiratory: Negative.    Cardiovascular: Negative.    Gastrointestinal: Positive for abdominal distention and abdominal pain. Negative for anal bleeding, blood in stool, constipation, diarrhea, nausea, rectal pain and vomiting.   Endocrine: Negative.    Musculoskeletal: Negative.    Skin: Negative.    Hematological: Negative.        Vitals:    10/25/22 1507   BP: 135/86   Pulse: 76   Temp: 96.4 °F (35.8 °C)   SpO2: 99%       Physical Exam  Vitals reviewed.   Constitutional:       Appearance: Normal  appearance. She is well-developed.   HENT:      Head: Normocephalic and atraumatic.   Eyes:      General: No scleral icterus.     Pupils: Pupils are equal, round, and reactive to light.   Pulmonary:      Effort: Pulmonary effort is normal. No respiratory distress.      Breath sounds: Normal breath sounds.   Abdominal:      General: Bowel sounds are normal. There is no distension.      Palpations: Abdomen is soft. There is no mass.      Tenderness: There is no abdominal tenderness.      Hernia: No hernia is present.   Skin:     General: Skin is warm and dry.      Coloration: Skin is not jaundiced.      Findings: No rash.   Neurological:      Mental Status: She is alert and oriented to person, place, and time. Mental status is at baseline.      Cranial Nerves: No cranial nerve deficit.   Psychiatric:         Behavior: Behavior normal.         Thought Content: Thought content normal.         Judgment: Judgment normal.         No visits with results within 2 Month(s) from this visit.   Latest known visit with results is:   Admission on 08/10/2022, Discharged on 08/10/2022   Component Date Value Ref Range Status   • Case Report 08/10/2022    Final                    Value:Surgical Pathology Report                         Case: [id number]                                  Authorizing Provider:  Cosmo Cordoba MD    Collected:           08/10/2022 01:59 PM          Ordering Location:     [facility]  Received:            08/10/2022 02:41 PM                                 ENDO SUITES                                                                  Pathologist:           Jamey Miller MD                                                         Specimen:    Large Intestine, Left / Descending Colon                                                  • Final Diagnosis 08/10/2022    Final                    Value:This result contains rich text formatting which cannot be displayed here.   • Gross Description  08/10/2022    Final                    Value:This result contains rich text formatting which cannot be displayed here.       Diagnoses and all orders for this visit:    1. Right lower quadrant abdominal pain (Primary)  -     FL small bowel follow through; Future      Patient 42-year-old female history of hypertension, polycystic ovarian syndrome, kidney stones with recurrent lower abdominal pain and cramps unrelieved by Neurontin or Bentyl.  EGD and colonoscopy significant only for status post gastric sleeve surgery.  Symptoms and history consistent with adhesion related pain.  Will arrange small bowel follow-through to evaluate for any possible functional stenosis and recommend evaluation by surgery.  If no surgical intervention planned will refer to pain management.  Hopefully will find a dominant stenosis that will allow us to repair the cause of her symptoms.

## 2022-10-27 RX ORDER — DICYCLOMINE HYDROCHLORIDE 10 MG/1
CAPSULE ORAL
Qty: 60 CAPSULE | Refills: 3 | Status: SHIPPED | OUTPATIENT
Start: 2022-10-27 | End: 2023-02-23

## 2022-11-11 ENCOUNTER — HOSPITAL ENCOUNTER (OUTPATIENT)
Dept: GENERAL RADIOLOGY | Facility: HOSPITAL | Age: 42
Discharge: HOME | End: 2022-11-11
Admitting: INTERNAL MEDICINE

## 2022-11-11 DIAGNOSIS — R10.31 RIGHT LOWER QUADRANT ABDOMINAL PAIN: ICD-10-CM

## 2022-11-11 PROCEDURE — 74250 X-RAY XM SM INT 1CNTRST STD: CPT

## 2022-11-11 RX ADMIN — BARIUM SULFATE 240 ML: 960 POWDER, FOR SUSPENSION ORAL at 09:54

## 2022-11-21 RX ORDER — MIRTAZAPINE 7.5 MG/1
TABLET, FILM COATED ORAL
Qty: 90 TABLET | Refills: 0 | Status: SHIPPED | OUTPATIENT
Start: 2022-11-21

## 2022-12-19 ENCOUNTER — TELEPHONE (OUTPATIENT)
Dept: GASTROENTEROLOGY | Facility: CLINIC | Age: 42
End: 2022-12-19

## 2022-12-19 NOTE — TELEPHONE ENCOUNTER
----- Message from Cosmo Cordoba MD sent at 11/27/2022  9:33 PM EST -----  No obstruction found, treat nausea as needed

## 2022-12-29 RX ORDER — PANTOPRAZOLE SODIUM 20 MG/1
TABLET, DELAYED RELEASE ORAL
Qty: 60 TABLET | Refills: 1 | Status: SHIPPED | OUTPATIENT
Start: 2022-12-29

## 2023-02-08 ENCOUNTER — TELEPHONE (OUTPATIENT)
Dept: GASTROENTEROLOGY | Facility: CLINIC | Age: 43
End: 2023-02-08
Payer: COMMERCIAL

## 2023-02-08 DIAGNOSIS — K59.00 CONSTIPATION, UNSPECIFIED CONSTIPATION TYPE: Primary | ICD-10-CM

## 2023-02-08 NOTE — TELEPHONE ENCOUNTER
Per Dr. Cordoba:   With negative CTA and negative colonoscopy findings consistent with IBS.  Pain not just related to the constipation.  Lets give trial of Linzess 145 daily, this should help both the pain and the constipation.  Patient can hold the MiraLAX and we will see how that helps.

## 2023-02-16 NOTE — TELEPHONE ENCOUNTER
Preparing for Your Surgery      Name:  Brooks Summers   MRN:  [id number]   :  1944   Today's Date:  2023       Arriving for surgery:  Surgery date:  23  Arrival time:  5:30am  Surgery time: 7:30am     Surgeries and procedures: Adult patients can have 2 visitors all through the surgery process.     Visiting hours: 8 a.m. to 8:30 p.m.     Hospital: Adult patients and children under age 18 can have 4 visitor at a time     No visitors under the age of 5 are allowed for hospital patients.  Double occupancy rooms: Patients can have only two visitors at a time.     Patients with disabilities: Can have a support person with them (family member, service provider     Or someone well informed about their needs) plus the allowed number of visitors     Patients confirmed or suspected to have symptoms of COVID 19 or flu:     No visitors allowed for adult patients.   Children (under age 18) can have 1 named visitor.     People who are sick or showing symptoms of COVID 19 or flu:    Are not allowed to visit patients--we can only make exceptions in special situations.       Please follow these guidelines for your visit:   Arrive wearing a mask over your mouth and nose; we will give you a medical mask to wear    If you arrive wearing a cloth mask.   Keep it on during your entire visit, even when in patient's room.   If you don't wear a mask we'll ask you to leave.     Clean your hands with alcohol hand . Do this when you arrive at and leave the building and patient room,    And again after you touch your mask or anything in the room.     You can t visit if you have a fever, cough, shortness of breath, muscle aches, headaches, sore throat    Or diarrhea      Stay 6 feet away from others during your visit and between visits     Go directly to and from the room you are visiting.     Stay in the patient s room during your visit. Limit going to other places in the hospital as much as possible     Leave bags and  Pt has looked at her EKG results on my chart she's calling to see what's abnormal. Please advise   jackets at home or in the car.     For everyone s health, please don t come and go during your visit. That includes for smoking   during your visit.     Please come to:     [facility] [address]  [address] [place]  [address]    - ? parking is available in front of the hospital      -   Parking is available in the Patient Visitor Ramp on [address] and [facility].     -   When entering the hospital you will be asked COVID screening questions, you will then be directed to Registration.  Registration will direct you to the 3rd floor Surgery waiting room.     -   Please ask if you need an escort or a wheelchair to the Surgery Waiting Room.  Preop number- [phone]      What can I eat or drink?  -  You may eat and drink normally up to 8 hours prior to arrival time. (Until 9:30pm on 2/22/23)  -  You may have clear liquids until 2 hours prior to arrival time. (Until 3:30am on 2/23/23)    Examples of clear liquids:  Water  Clear broth  Juices (apple, white grape, white cranberry  and cider) without pulp  Noncarbonated, powder based beverages  (lemonade and Jorge-Aid)  Sodas (Sprite, 7-Up, ginger ale and seltzer)  Coffee or tea (without milk or cream)  Gatorade    -  No Alcohol for at least 24 hours before surgery.     Which medicines can I take?    Hold Multivitamins for 7 days before surgery.    Hold Ibuprofen (Advil, Motrin) for 1 day before surgery--unless otherwise directed by surgeon.  Hold Naproxen (Aleve) for 4 days before surgery.    Hold Warfarin (Coumadin) for 5 days before surgery. Take last dose 2/17/23.    -  DO NOT take these medications the day of surgery:   Vitamin D   Hydrocortisone cream    -  PLEASE TAKE these medications the day of surgery:   Allopurinol (zyloprim)   Levetiracetam (keppra)   Losartan (cozaar)   Metoprolol succinate    Oxybutynin ER (ditropan)    How do I prepare myself?  - Please take 2 showers before surgery  using Scrubcare or Hibiclens soap.    Use this soap only from the neck to your toes.     Leave the soap on your skin for one minute--then rinse thoroughly.      You may use your own shampoo and conditioner. No other hair products.   - Please remove all jewelry and body piercings.  - No lotions, deodorants or fragrance.  - Bring your ID and insurance card.    -If you have a Deep Brain Stimulator, Spinal Cord Stimulator, or any Neuro Stimulator device---you must bring the remote control to the hospital.      ALL PATIENTS GOING HOME THE SAME DAY OF SURGERY ARE REQUIRED TO HAVE A RESPONSIBLE ADULT TO DRIVE AND BE IN ATTENDANCE WITH THEM FOR 24 HOURS FOLLOWING SURGERY.    Covid testing policy as of 12/06/2022  Your surgeon will notify and schedule you for a COVID test if one is needed before surgery--please direct any questions or COVID symptoms to your surgeon      Questions or Concerns:    - For any questions regarding the day of surgery or your hospital stay, please contact the Pre Admission Nursing Office at [phone].       - If you have health changes between today and your surgery, please call your surgeon.       - For questions after surgery, please call your surgeons office.

## 2023-02-23 ENCOUNTER — TELEPHONE (OUTPATIENT)
Dept: GASTROENTEROLOGY | Facility: CLINIC | Age: 43
End: 2023-02-23
Payer: COMMERCIAL

## 2023-02-23 RX ORDER — DICYCLOMINE HYDROCHLORIDE 10 MG/1
CAPSULE ORAL
Qty: 60 CAPSULE | Refills: 3 | Status: SHIPPED | OUTPATIENT
Start: 2023-02-23

## 2023-02-23 NOTE — TELEPHONE ENCOUNTER
PA in process thru [facility]    PA approved 2/23/2023-2/23/2024   To be scanned into Media  L/mess to notify patient

## 2023-02-27 RX ORDER — MIRTAZAPINE 7.5 MG/1
TABLET, FILM COATED ORAL
Qty: 90 TABLET | Refills: 0 | OUTPATIENT
Start: 2023-02-27

## 2023-03-20 RX ORDER — PANTOPRAZOLE SODIUM 20 MG/1
TABLET, DELAYED RELEASE ORAL
Qty: 60 TABLET | Refills: 1 | OUTPATIENT
Start: 2023-03-20

## 2023-03-27 RX ORDER — ONDANSETRON 4 MG/1
4 TABLET, ORALLY DISINTEGRATING ORAL EVERY 8 HOURS PRN
Qty: 25 TABLET | Refills: 2 | Status: SHIPPED | OUTPATIENT
Start: 2023-03-27

## 2023-03-27 RX ORDER — PANTOPRAZOLE SODIUM 40 MG/1
TABLET, DELAYED RELEASE ORAL
Qty: 30 TABLET | OUTPATIENT
Start: 2023-03-27

## 2023-04-03 RX ORDER — PANTOPRAZOLE SODIUM 20 MG/1
TABLET, DELAYED RELEASE ORAL
Qty: 60 TABLET | Refills: 1 | OUTPATIENT
Start: 2023-04-03

## 2023-05-01 RX ORDER — PANTOPRAZOLE SODIUM 20 MG/1
TABLET, DELAYED RELEASE ORAL
Qty: 60 TABLET | Refills: 1 | OUTPATIENT
Start: 2023-05-01

## 2023-05-04 ENCOUNTER — TELEPHONE (OUTPATIENT)
Dept: BARIATRICS/WEIGHT MGMT | Facility: CLINIC | Age: 43
End: 2023-05-04
Payer: COMMERCIAL

## 2023-05-04 ENCOUNTER — PREP FOR SURGERY (OUTPATIENT)
Dept: OTHER | Facility: HOSPITAL | Age: 43
End: 2023-05-04
Payer: COMMERCIAL

## 2023-05-04 ENCOUNTER — OFFICE VISIT (OUTPATIENT)
Dept: BARIATRICS/WEIGHT MGMT | Facility: CLINIC | Age: 43
End: 2023-05-04
Payer: COMMERCIAL

## 2023-05-04 VITALS
BODY MASS INDEX: 39.71 KG/M2 | TEMPERATURE: 97.3 F | HEIGHT: 67 IN | HEART RATE: 82 BPM | WEIGHT: 253 LBS | SYSTOLIC BLOOD PRESSURE: 150 MMHG | DIASTOLIC BLOOD PRESSURE: 94 MMHG

## 2023-05-04 DIAGNOSIS — R11.0 NAUSEA: ICD-10-CM

## 2023-05-04 DIAGNOSIS — R13.10 DYSPHAGIA, UNSPECIFIED TYPE: ICD-10-CM

## 2023-05-04 DIAGNOSIS — K21.9 GASTROESOPHAGEAL REFLUX DISEASE WITHOUT ESOPHAGITIS: Primary | ICD-10-CM

## 2023-05-04 DIAGNOSIS — E66.9 OBESITY, CLASS II, BMI 35-39.9: Primary | ICD-10-CM

## 2023-05-04 DIAGNOSIS — I10 ESSENTIAL HYPERTENSION: ICD-10-CM

## 2023-05-04 DIAGNOSIS — Z98.84 S/P LAPAROSCOPIC SLEEVE GASTRECTOMY: ICD-10-CM

## 2023-05-04 DIAGNOSIS — K21.9 GASTROESOPHAGEAL REFLUX DISEASE WITHOUT ESOPHAGITIS: ICD-10-CM

## 2023-05-04 PROBLEM — R10.31 RIGHT LOWER QUADRANT ABDOMINAL PAIN: Status: RESOLVED | Noted: 2022-04-22 | Resolved: 2023-05-04

## 2023-05-04 RX ORDER — PANTOPRAZOLE SODIUM 40 MG/1
40 TABLET, DELAYED RELEASE ORAL DAILY
Qty: 90 TABLET | Refills: 2 | Status: SHIPPED | OUTPATIENT
Start: 2023-05-04 | End: 2024-01-29

## 2023-05-04 RX ORDER — LOSARTAN POTASSIUM 50 MG/1
1 TABLET ORAL DAILY
COMMUNITY
Start: 2023-04-15

## 2023-05-04 RX ORDER — PANTOPRAZOLE SODIUM 40 MG/1
1 TABLET, DELAYED RELEASE ORAL DAILY
COMMUNITY
Start: 2023-02-23 | End: 2023-05-04 | Stop reason: SDUPTHER

## 2023-05-04 RX ORDER — FLUOXETINE HYDROCHLORIDE 20 MG/1
3 CAPSULE ORAL DAILY
COMMUNITY
Start: 2023-04-17

## 2023-05-04 RX ORDER — GABAPENTIN 100 MG/1
1 CAPSULE ORAL 3 TIMES DAILY
COMMUNITY
Start: 2023-02-23

## 2023-05-04 RX ORDER — SODIUM CHLORIDE, SODIUM LACTATE, POTASSIUM CHLORIDE, CALCIUM CHLORIDE 600; 310; 30; 20 MG/100ML; MG/100ML; MG/100ML; MG/100ML
30 INJECTION, SOLUTION INTRAVENOUS CONTINUOUS
OUTPATIENT
Start: 2023-05-04

## 2023-05-04 NOTE — H&P (VIEW-ONLY)
[facility] BARIATRIC SURGERY  [address] [facility] [address]  [facility] [address]  [phone]  [address] [address]  [phone]  Dept: [phone]  5/4/2023      Lizette Connolly.  [id number]  [id number]  1980  female      Chief Complaint   Patient presents with   • Follow-up     3 yr fup sleeve       BH Post-Op Bariatric Surgery:   Lizette Connolly is status post Laparoscopic Sleeve procedure, performed on 5/20/20     HPI:   Today's weight is 115 kg (253 lb) pounds, today's BMI is Body mass index is 39.62 kg/m².,@ has a  loss of 16 pounds since the last visit and@ weight loss since surgery is 79 pounds. The patient reports a decreased portion size and loss of appetite.      Lizette Connolly reports ongoing nausea, dysphagia with solids, and ongoing dull pain midline to right upper side of her abdomen which is worse with eating. She reports that she is tolerating carbs and soft foods. She doesn't tolerate any solid protein well. She denies heartburn but is taking protonix at 40mg consistently.     She has undergone two EGD's with dilation of the pyloric sphincter in 2020 without relief. She did see GI and underwent colonoscopy to evaluate cause of RLQ abdominal pain which has since resolved. She reports that she had mood changes with Remeron and symptoms of tardive dyskinesia on reglan so had to stop both medications.      Diet and Exercise: Diet history reviewed and discussed with the patient. Weight loss/gains to date discussed with the patient. The patient states they are eating 60 grams of protein per day. She reports eating 3 meals per day, a typical portion size of 1/2 cup, eating 1-2 snacks per day, drinking 5-6 or more 8-oz. glasses of water per day, no carbonated beverage consumption.    Supplements:  Bariatric MTV with iron and calcium    Review of Systems   Constitutional: Positive for appetite change. Negative for fatigue and  unexpected weight change.   HENT: Positive for trouble swallowing. Tinnitus: solids.    Eyes: Negative.    Respiratory: Negative.    Cardiovascular: Negative.  Negative for leg swelling.   Gastrointestinal: Positive for abdominal pain and nausea (right sided under rib cage). Negative for abdominal distention, constipation, diarrhea and vomiting.   Genitourinary: Negative for difficulty urinating, frequency and urgency.   Musculoskeletal: Negative for back pain.   Skin: Negative.    Psychiatric/Behavioral: Negative.    All other systems reviewed and are negative.      Patient Active Problem List   Diagnosis   • Chronic fatigue   • GERD (gastroesophageal reflux disease)   • Anxiety and depression   • Essential hypertension   • Vitamin D deficiency   • PCOS (polycystic ovarian syndrome)   • Hypertriglyceridemia   • Migraine   • Dietary counseling   • Snoring   • S/P laparoscopic sleeve gastrectomy   • Obesity, Class II, BMI 35-39.9   • Nausea   • Gastroesophageal reflux disease without esophagitis   • Dysphagia   • Abnormal CT scan, colon   • Right lower quadrant abdominal pain       Past Medical History:   Diagnosis Date   • Anxiety and depression    • GERD (gastroesophageal reflux disease)    • Hip pain 01/07/2019    RIGHT   • HPV in female    • Hypertension    • Kidney stones 01/07/2019   • Lactose intolerance May 2020    After gadtric sleeve   • Migraine    • PCOS (polycystic ovarian syndrome)    • Sleep apnea     DX IN MARCH, HAS NOT GOTTEN CPAP YET       The following portions of the patient's history were reviewed and updated as appropriate: allergies, current medications, past family history, past medical history, past social history, past surgical history and problem list.    Vitals:    05/04/23 1153   BP: 150/94   Pulse: 82   Temp: 97.3 °F (36.3 °C)       Physical Exam  Vitals and nursing note reviewed.   Constitutional:       Appearance: She is well-developed. She is not diaphoretic.   Pulmonary:       Effort: Pulmonary effort is normal.   Neurological:      Mental Status: She is alert and oriented to person, place, and time.   Psychiatric:         Behavior: Behavior normal.         Assessment:   Post-op, the patient has ongoing nausea and dysphagia with solids. She is no longer meeting nutritional needs with supplements and protein shakes and weight is not well controlled due to adherence to a soft food and high carb diet.    Encounter Diagnoses   Name Primary?   • Obesity, Class II, BMI 35-39.9 Yes   • S/P laparoscopic sleeve gastrectomy    • Essential hypertension    • Gastroesophageal reflux disease without esophagitis        Plan:   Will proceed with EGD to evaluate upper GI tract and patient will follow up with [facility] to discuss options including possible revision to relieve dysphagia, nausea, and heartburn.  Encouraged patient to be sure to get plenty of lean protein per day through small frequent meals all with a protein source.   Activity restrictions: none.   Recommended patient be sure to get at least 70 grams of protein per day by eating small, frequent meals all with high lean protein choices. Be sure to limit/cut back on daily carbohydrate intake. Discussed with the patient the recommended amount of water per day to intake- half of body weight in ounces. Reviewed vitamin requirements. Be sure to do routine exercise, 150 minutes per week minimum, including both cardio and strength training.     Instructions / Recommendations: dietary counseling recommended, recommended a daily protein intake of  grams, vitamin supplement(s) recommended, recommended exercising at least 150 minutes per week, behavior modifications recommended and instructed to call the office for concerns, questions, or problems.     The patient was instructed to follow up in 3 months .    Total time spent during this encounter today was 35 minutes

## 2023-05-04 NOTE — PROGRESS NOTES
[facility] BARIATRIC SURGERY  [address] [facility] [address]  [facility] [address]  [phone]  [address] [address]  [phone]  Dept: [phone]  5/4/2023      Lizette Connolly.  [id number]  [id number]  1980  female      Chief Complaint   Patient presents with   • Follow-up     3 yr fup sleeve       BH Post-Op Bariatric Surgery:   Lizette Connolly is status post Laparoscopic Sleeve procedure, performed on 5/20/20     HPI:   Today's weight is 115 kg (253 lb) pounds, today's BMI is Body mass index is 39.62 kg/m².,@ has a  loss of 16 pounds since the last visit and@ weight loss since surgery is 79 pounds. The patient reports a decreased portion size and loss of appetite.      Lizette Connolly reports ongoing nausea, dysphagia with solids, and ongoing dull pain midline to right upper side of her abdomen which is worse with eating. She reports that she is tolerating carbs and soft foods. She doesn't tolerate any solid protein well. She denies heartburn but is taking protonix at 40mg consistently.     She has undergone two EGD's with dilation of the pyloric sphincter in 2020 without relief. She did see GI and underwent colonoscopy to evaluate cause of RLQ abdominal pain which has since resolved. She reports that she had mood changes with Remeron and symptoms of tardive dyskinesia on reglan so had to stop both medications.      Diet and Exercise: Diet history reviewed and discussed with the patient. Weight loss/gains to date discussed with the patient. The patient states they are eating 60 grams of protein per day. She reports eating 3 meals per day, a typical portion size of 1/2 cup, eating 1-2 snacks per day, drinking 5-6 or more 8-oz. glasses of water per day, no carbonated beverage consumption.    Supplements:  Bariatric MTV with iron and calcium    Review of Systems   Constitutional: Positive for appetite change. Negative for fatigue and  unexpected weight change.   HENT: Positive for trouble swallowing. Tinnitus: solids.    Eyes: Negative.    Respiratory: Negative.    Cardiovascular: Negative.  Negative for leg swelling.   Gastrointestinal: Positive for abdominal pain and nausea (right sided under rib cage). Negative for abdominal distention, constipation, diarrhea and vomiting.   Genitourinary: Negative for difficulty urinating, frequency and urgency.   Musculoskeletal: Negative for back pain.   Skin: Negative.    Psychiatric/Behavioral: Negative.    All other systems reviewed and are negative.      Patient Active Problem List   Diagnosis   • Chronic fatigue   • GERD (gastroesophageal reflux disease)   • Anxiety and depression   • Essential hypertension   • Vitamin D deficiency   • PCOS (polycystic ovarian syndrome)   • Hypertriglyceridemia   • Migraine   • Dietary counseling   • Snoring   • S/P laparoscopic sleeve gastrectomy   • Obesity, Class II, BMI 35-39.9   • Nausea   • Gastroesophageal reflux disease without esophagitis   • Dysphagia   • Abnormal CT scan, colon   • Right lower quadrant abdominal pain       Past Medical History:   Diagnosis Date   • Anxiety and depression    • GERD (gastroesophageal reflux disease)    • Hip pain 01/07/2019    RIGHT   • HPV in female    • Hypertension    • Kidney stones 01/07/2019   • Lactose intolerance May 2020    After gadtric sleeve   • Migraine    • PCOS (polycystic ovarian syndrome)    • Sleep apnea     DX IN MARCH, HAS NOT GOTTEN CPAP YET       The following portions of the patient's history were reviewed and updated as appropriate: allergies, current medications, past family history, past medical history, past social history, past surgical history and problem list.    Vitals:    05/04/23 1153   BP: 150/94   Pulse: 82   Temp: 97.3 °F (36.3 °C)       Physical Exam  Vitals and nursing note reviewed.   Constitutional:       Appearance: She is well-developed. She is not diaphoretic.   Pulmonary:       Effort: Pulmonary effort is normal.   Neurological:      Mental Status: She is alert and oriented to person, place, and time.   Psychiatric:         Behavior: Behavior normal.         Assessment:   Post-op, the patient has ongoing nausea and dysphagia with solids. She is no longer meeting nutritional needs with supplements and protein shakes and weight is not well controlled due to adherence to a soft food and high carb diet.    Encounter Diagnoses   Name Primary?   • Obesity, Class II, BMI 35-39.9 Yes   • S/P laparoscopic sleeve gastrectomy    • Essential hypertension    • Gastroesophageal reflux disease without esophagitis        Plan:   Will proceed with EGD to evaluate upper GI tract and patient will follow up with [facility] to discuss options including possible revision to relieve dysphagia, nausea, and heartburn.  Encouraged patient to be sure to get plenty of lean protein per day through small frequent meals all with a protein source.   Activity restrictions: none.   Recommended patient be sure to get at least 70 grams of protein per day by eating small, frequent meals all with high lean protein choices. Be sure to limit/cut back on daily carbohydrate intake. Discussed with the patient the recommended amount of water per day to intake- half of body weight in ounces. Reviewed vitamin requirements. Be sure to do routine exercise, 150 minutes per week minimum, including both cardio and strength training.     Instructions / Recommendations: dietary counseling recommended, recommended a daily protein intake of  grams, vitamin supplement(s) recommended, recommended exercising at least 150 minutes per week, behavior modifications recommended and instructed to call the office for concerns, questions, or problems.     The patient was instructed to follow up in 3 months .    Total time spent during this encounter today was 35 minutes

## 2023-05-16 ENCOUNTER — ANESTHESIA EVENT (OUTPATIENT)
Dept: GASTROENTEROLOGY | Facility: HOSPITAL | Age: 43
End: 2023-05-16
Payer: COMMERCIAL

## 2023-05-16 ENCOUNTER — HOSPITAL ENCOUNTER (OUTPATIENT)
Facility: HOSPITAL | Age: 43
Setting detail: HOSPITAL OUTPATIENT SURGERY
Discharge: HOME | End: 2023-05-16
Attending: SURGERY | Admitting: SURGERY
Payer: COMMERCIAL

## 2023-05-16 ENCOUNTER — ANESTHESIA (OUTPATIENT)
Dept: GASTROENTEROLOGY | Facility: HOSPITAL | Age: 43
End: 2023-05-16
Payer: COMMERCIAL

## 2023-05-16 VITALS
RESPIRATION RATE: 16 BRPM | WEIGHT: 254 LBS | BODY MASS INDEX: 39.87 KG/M2 | OXYGEN SATURATION: 100 % | HEART RATE: 54 BPM | SYSTOLIC BLOOD PRESSURE: 108 MMHG | HEIGHT: 67 IN | DIASTOLIC BLOOD PRESSURE: 73 MMHG

## 2023-05-16 DIAGNOSIS — K21.9 GASTROESOPHAGEAL REFLUX DISEASE WITHOUT ESOPHAGITIS: ICD-10-CM

## 2023-05-16 DIAGNOSIS — R11.0 NAUSEA: ICD-10-CM

## 2023-05-16 DIAGNOSIS — R13.10 DYSPHAGIA, UNSPECIFIED TYPE: ICD-10-CM

## 2023-05-16 PROCEDURE — C1726 CATH, BAL DIL, NON-VASCULAR: HCPCS | Performed by: SURGERY

## 2023-05-16 PROCEDURE — 25010000002 PROPOFOL 10 MG/ML EMULSION: Performed by: ANESTHESIOLOGY

## 2023-05-16 PROCEDURE — 87081 CULTURE SCREEN ONLY: CPT | Performed by: SURGERY

## 2023-05-16 RX ORDER — PROPOFOL 10 MG/ML
VIAL (ML) INTRAVENOUS AS NEEDED
Status: DISCONTINUED | OUTPATIENT
Start: 2023-05-16 | End: 2023-05-16 | Stop reason: SURG

## 2023-05-16 RX ORDER — LIDOCAINE HYDROCHLORIDE 20 MG/ML
INJECTION, SOLUTION INFILTRATION; PERINEURAL AS NEEDED
Status: DISCONTINUED | OUTPATIENT
Start: 2023-05-16 | End: 2023-05-16 | Stop reason: SURG

## 2023-05-16 RX ORDER — SODIUM CHLORIDE, SODIUM LACTATE, POTASSIUM CHLORIDE, CALCIUM CHLORIDE 600; 310; 30; 20 MG/100ML; MG/100ML; MG/100ML; MG/100ML
30 INJECTION, SOLUTION INTRAVENOUS CONTINUOUS
Status: DISCONTINUED | OUTPATIENT
Start: 2023-05-16 | End: 2023-05-16 | Stop reason: HOSPADM

## 2023-05-16 RX ADMIN — PROPOFOL 200 MG: 10 INJECTION, EMULSION INTRAVENOUS at 10:07

## 2023-05-16 RX ADMIN — LIDOCAINE HYDROCHLORIDE 60 MG: 20 INJECTION, SOLUTION INFILTRATION; PERINEURAL at 10:07

## 2023-05-16 RX ADMIN — PROPOFOL 200 MCG/KG/MIN: 10 INJECTION, EMULSION INTRAVENOUS at 10:07

## 2023-05-16 RX ADMIN — SODIUM CHLORIDE, POTASSIUM CHLORIDE, SODIUM LACTATE AND CALCIUM CHLORIDE 30 ML/HR: 600; 310; 30; 20 INJECTION, SOLUTION INTRAVENOUS at 09:59

## 2023-05-16 NOTE — ANESTHESIA POSTPROCEDURE EVALUATION
Patient: Lizette Connolly    Procedure Summary     Date: 05/16/23 Room / Location:  [facility]    Anesthesia Start: 1003 Anesthesia Stop: 1017    Procedure: ESOPHAGOGASTRODUODENOSCOPY WITH COLD BIOPSIES, BALLOON DILATATION 18-20MM (Esophagus) Diagnosis:       Gastroesophageal reflux disease without esophagitis      Nausea      Dysphagia, unspecified type      (Gastroesophageal reflux disease without esophagitis [K21.9])      (Nausea [R11.0])      (Dysphagia, unspecified type [R13.10])    Surgeons: Shady Betancourt Jr., MD Provider: Jacob Casarez MD    Anesthesia Type: MAC ASA Status: 3          Anesthesia Type: MAC    Vitals  Vitals Value Taken Time   /73 05/16/23 1028   Temp     Pulse 54 05/16/23 1028   Resp 16 05/16/23 1028   SpO2 100 % 05/16/23 1028           Post Anesthesia Care and Evaluation    Patient location during evaluation: PHASE II  Patient participation: complete - patient participated  Level of consciousness: awake and alert  Pain management: adequate    Airway patency: patent  Anesthetic complications: No anesthetic complications  PONV Status: none  Cardiovascular status: acceptable and hemodynamically stable  Respiratory status: acceptable, nonlabored ventilation and spontaneous ventilation  Hydration status: acceptable

## 2023-05-16 NOTE — OP NOTE
Surgeon: Shady Betancourt Jr., M.D.    Preoperative Diagnosis: #1 nausea, dysphagia with solids #2 history of laparoscopic sleeve gastrectomy    Postoperative Diagnosis: Gastritis    Procedure Performed: Transoral esophagogastroduodenoscopy with 18-20 balloon dilatation of pylorus    Indications: 42-year-old female status post laparoscopic sleeve gastrectomy 2020 who has undergone dilatations in the past.  Patient with complaints of dysphagia with solids as well as nausea.    Procedure:     The procedure, indications, preparation and potential, patient were explained to the patient, who indicated understanding and signed the corresponding consent forms.  The patient was identified, taken to the endoscopy suite, and placed on the left side down decubitus position.  The patient underwent a MAC anesthesia and was appropriately monitored through the case by the anesthesia personnel using continuous pulse oximetry, blood pressure, and cardiac monitoring.  A bite block was placed.  After adequate IV sedation and using a transoral technique a lubed flexible endoscope was placed in the hypopharynx and advanced to the second portion of the duodenum.  The pylorus was mildly strictured and the scope had to be advanced with some pressure into the duodenum.  The scope was then withdrawn back into the gastric sleeve.  There was no stricture noted in the gastric sleeve unless dictated below.  No polyps or ulcers were seen unless dictated below.  The scope was then withdrawn back into the esophagus.  The Z line was regular and no erosive esophagitis seen unless dictated below.  Scope was then advanced back down into the antrum.  A 18-20 balloon catheter was then advanced across the pylorus and taken up in sequence and each level held for approximately 1 minute.  The catheter was deflated and removed.  The pylorus dilated up nicely.  The scope was then completely withdrawn after decompressing the stomach.  The patient tolerated the  procedure well and left the endoscopy suite in stable condition.    The sleeve was of normal size without stricture or dilatation.  The pylorus opened up nicely after dilatation with the 18-20 balloon.  No ulcers noted in the gastric sleeve.  Patchy erythema noted in the antrum.  Cold forcep biopsies taken to rule out Helicobacter pylori..  The balloon was kept inflated at 20 mmHg and pulled back across the incisura with slight resistance but without difficulty.  The distal esophagus was noted to have some mild resistance passing the endoscope.  The distal esophagus was also dilated with 18-20 balloon.  Z-line was regular and no erosive esophagitis noted.    Recommendations:     Await biopsy results and follow-up in the office

## 2023-05-16 NOTE — ANESTHESIA PREPROCEDURE EVALUATION
Anesthesia Evaluation     Patient summary reviewed and Nursing notes reviewed   NPO Solid Status: > 8 hours  NPO Liquid Status: > 2 hours           Airway   Mallampati: III  TM distance: >3 FB  Neck ROM: full  Possible difficult intubation  Dental - normal exam     Pulmonary - normal exam   (+) sleep apnea,     ROS comment: No CPAP yet  Cardiovascular - normal exam    ECG reviewed    (+) hypertension less than 2 medications, hyperlipidemia,       Neuro/Psych  (+) headaches, psychiatric history Anxiety and Depression,      ROS Comment: Migraines  GI/Hepatic/Renal/Endo    (+) obesity, morbid obesity, GERD,  renal disease stones,     ROS Comment: Hx gastric sleeve    Musculoskeletal     Abdominal   (+) obese,    Substance History      OB/GYN      Comment: PCOS      Other                        Anesthesia Plan    ASA 3     MAC     intravenous induction     Anesthetic plan, risks, benefits, and alternatives have been provided, discussed and informed consent has been obtained with: patient.        CODE STATUS:

## 2023-05-17 LAB — UREASE TISS QL: NEGATIVE

## 2023-06-05 RX ORDER — DICYCLOMINE HYDROCHLORIDE 10 MG/1
CAPSULE ORAL
Qty: 60 CAPSULE | Refills: 3 | Status: SHIPPED | OUTPATIENT
Start: 2023-06-05

## 2023-06-09 RX ORDER — ONDANSETRON 4 MG/1
4 TABLET, ORALLY DISINTEGRATING ORAL EVERY 8 HOURS PRN
Qty: 60 TABLET | Refills: 11 | Status: SHIPPED | OUTPATIENT
Start: 2023-06-09

## 2023-11-16 RX ORDER — PANTOPRAZOLE SODIUM 20 MG/1
20 TABLET, DELAYED RELEASE ORAL 2 TIMES DAILY
Qty: 60 TABLET | Refills: 1 | Status: SHIPPED | OUTPATIENT
Start: 2023-11-16

## 2024-02-19 DIAGNOSIS — K21.9 GASTROESOPHAGEAL REFLUX DISEASE WITHOUT ESOPHAGITIS: Primary | ICD-10-CM

## 2024-02-19 RX ORDER — PANTOPRAZOLE SODIUM 20 MG/1
20 TABLET, DELAYED RELEASE ORAL 2 TIMES DAILY
Qty: 60 TABLET | Refills: 1 | Status: SHIPPED | OUTPATIENT
Start: 2024-02-19

## 2024-02-19 RX ORDER — PANTOPRAZOLE SODIUM 40 MG/1
40 TABLET, DELAYED RELEASE ORAL DAILY
Qty: 90 TABLET | Refills: 2 | OUTPATIENT
Start: 2024-02-19

## 2024-04-03 RX ORDER — ONDANSETRON 4 MG/1
4 TABLET, ORALLY DISINTEGRATING ORAL EVERY 8 HOURS PRN
Qty: 60 TABLET | Refills: 11 | OUTPATIENT
Start: 2024-04-03

## 2024-04-19 ENCOUNTER — PREP FOR SURGERY (OUTPATIENT)
Dept: OTHER | Facility: HOSPITAL | Age: 44
End: 2024-04-19
Payer: COMMERCIAL

## 2024-04-19 ENCOUNTER — OFFICE VISIT (OUTPATIENT)
Dept: BARIATRICS/WEIGHT MGMT | Facility: CLINIC | Age: 44
End: 2024-04-19
Payer: COMMERCIAL

## 2024-04-19 VITALS
DIASTOLIC BLOOD PRESSURE: 95 MMHG | HEIGHT: 67 IN | WEIGHT: 254 LBS | SYSTOLIC BLOOD PRESSURE: 146 MMHG | HEART RATE: 68 BPM | TEMPERATURE: 97.5 F | BODY MASS INDEX: 39.87 KG/M2

## 2024-04-19 DIAGNOSIS — R11.0 NAUSEA: ICD-10-CM

## 2024-04-19 DIAGNOSIS — K21.9 GASTROESOPHAGEAL REFLUX DISEASE WITHOUT ESOPHAGITIS: ICD-10-CM

## 2024-04-19 DIAGNOSIS — E66.9 OBESITY, CLASS II, BMI 35-39.9: Primary | ICD-10-CM

## 2024-04-19 DIAGNOSIS — Z98.84 S/P LAPAROSCOPIC SLEEVE GASTRECTOMY: ICD-10-CM

## 2024-04-19 DIAGNOSIS — Z98.84 S/P LAPAROSCOPIC SLEEVE GASTRECTOMY: Primary | ICD-10-CM

## 2024-04-19 PROBLEM — I67.848 VASOSPASM OF CEREBRAL ARTERY: Status: ACTIVE | Noted: 2024-01-30

## 2024-04-19 PROBLEM — R93.0 ABNORMAL HEAD CT: Status: ACTIVE | Noted: 2024-01-24

## 2024-04-19 RX ORDER — LANOLIN ALCOHOL/MO/W.PET/CERES
CREAM (GRAM) TOPICAL DAILY
COMMUNITY

## 2024-04-19 RX ORDER — ALBUTEROL SULFATE 90 UG/1
AEROSOL, METERED RESPIRATORY (INHALATION)
COMMUNITY
Start: 2024-02-12

## 2024-04-19 RX ORDER — SODIUM CHLORIDE, SODIUM LACTATE, POTASSIUM CHLORIDE, CALCIUM CHLORIDE 600; 310; 30; 20 MG/100ML; MG/100ML; MG/100ML; MG/100ML
30 INJECTION, SOLUTION INTRAVENOUS CONTINUOUS
OUTPATIENT
Start: 2024-04-19

## 2024-04-19 RX ORDER — ONDANSETRON 4 MG/1
4 TABLET, ORALLY DISINTEGRATING ORAL EVERY 8 HOURS PRN
Qty: 60 TABLET | Refills: 3 | Status: SHIPPED | OUTPATIENT
Start: 2024-04-19

## 2024-04-19 RX ORDER — MONTELUKAST SODIUM 10 MG/1
10 TABLET ORAL DAILY
COMMUNITY
Start: 2023-12-14

## 2024-04-19 RX ORDER — SUMATRIPTAN 100 MG/1
TABLET, FILM COATED ORAL
COMMUNITY
Start: 2024-04-12

## 2024-04-19 RX ORDER — FLUTICASONE PROPIONATE 50 MCG
SPRAY, SUSPENSION (ML) NASAL
COMMUNITY
Start: 2024-02-12

## 2024-04-19 NOTE — PROGRESS NOTES
[facility] BARIATRIC SURGERY  [address]  [facility] [id number] [phone]  [address]  [facility] [address] [phone]  Dept: [phone]  4/19/2024      Lizette Connolly.  [id number]  [id number]  1980  female      Chief Complaint   Patient presents with    Follow-up     Sleeve follow up / Nausea        BH Post-Op Bariatric Surgery:   Lizette Connolly is status post Laparoscopic Sleeve procedure, performed on 05/20/2020     HPI:       Today's weight is 115 kg (254 lb) pounds, today's BMI is Body mass index is 39.77 kg/m²., she has a gain of 1 pounds since the last visit and her  weight loss since surgery is 78 pounds. The patient reports a decreased portion size and loss of appetite.    Lizette Connolly reports n/v daily. Pt reports most everything makes her nauseous. She reports being able to tolerate some foods better. Pt reports issues with solid proteins and heavier proteins like beef and steak. Pt reports taking Zofran prior to every meal and this mildly improves nausea. She does report occ. Vomiting. She denies any pain or difficulty swallowing. Pt denies any foods getting stuck. Pt states she has been nauseous since her surgery in 2020.    Pt has had multiple scopes with last scope in May 2023 with dilatation performed. She does have hx of h. Pylori in April 2021.    Pt had severe HA in Feb. 2024 and was found to have small 1mm cerebral aneurysm. Pt is being followed by neuro for this and will have yearly MRI.     Diet and Exercise: Diet history reviewed and discussed with the patient. Weight loss/gains to date discussed with the patient. The patient states they are eating unknown grams of protein per day. She reports eating 2-3 meals per day, a typical portion size of <1 cup, eating 0-2 snacks per day, drinking 7 or more 8-oz. glasses of water per day, no carbonated beverage consumption and exercising regularly.      Bfast- frozen bfast sandwich/ waffle  Occ. Coffee with creamer  Lunch- 1/4 sandwich/ leftovers/ carrot sticks 6-7  Dinner- home cooked- baked chicken, peas/carrots/ roast, baked sweet potatoes    Supplements: multi.     Review of Systems   Constitutional:  Positive for appetite change. Negative for fatigue and unexpected weight change.   HENT: Negative.     Eyes: Negative.    Respiratory: Negative.     Cardiovascular: Negative.  Negative for leg swelling.   Gastrointestinal:  Positive for nausea and vomiting. Negative for abdominal distention, abdominal pain, constipation and diarrhea.   Genitourinary:  Negative for difficulty urinating, frequency and urgency.   Musculoskeletal:  Negative for back pain.   Skin: Negative.    Psychiatric/Behavioral: Negative.     All other systems reviewed and are negative.      Patient Active Problem List   Diagnosis    Chronic fatigue    GERD (gastroesophageal reflux disease)    Anxiety and depression    Essential hypertension    Vitamin D deficiency    PCOS (polycystic ovarian syndrome)    Hypertriglyceridemia    Migraine    Dietary counseling    Snoring    S/P laparoscopic sleeve gastrectomy    Obesity, Class II, BMI 35-39.9    Nausea    Gastroesophageal reflux disease without esophagitis    Dysphagia    Abnormal CT scan, colon    Abnormal head CT    Vasospasm of cerebral artery       Past Medical History:   Diagnosis Date    Anxiety and depression     GERD (gastroesophageal reflux disease)     Hip pain 01/07/2019    RIGHT    HPV in female     Hypertension     Kidney stones 01/07/2019    Lactose intolerance May 2020    After gadtric sleeve    Migraine     PCOS (polycystic ovarian syndrome)     Sleep apnea     GONE       The following portions of the patient's history were reviewed and updated as appropriate: allergies, current medications, past medical history, past surgical history, and problem list.    Vitals:    04/19/24 1518   BP: 146/95   Pulse: 68   Temp: 97.5 °F (36.4  °C)       Physical Exam  Vitals reviewed.   Constitutional:       Appearance: Normal appearance.   HENT:      Head: Normocephalic and atraumatic.   Eyes:      Pupils: Pupils are equal, round, and reactive to light.   Cardiovascular:      Rate and Rhythm: Normal rate.   Pulmonary:      Effort: Pulmonary effort is normal. No respiratory distress.      Breath sounds: Normal breath sounds.   Abdominal:      General: Abdomen is flat. Bowel sounds are normal.      Palpations: Abdomen is soft.   Musculoskeletal:         General: Normal range of motion.      Cervical back: Normal range of motion and neck supple.   Neurological:      General: No focal deficit present.      Mental Status: She is alert and oriented to person, place, and time.   Psychiatric:         Mood and Affect: Mood normal.         Behavior: Behavior normal.         Assessment:   Post-op, the patient is struggling with daily nausea and struggling to get adequate protein intake.     Encounter Diagnoses   Name Primary?    Obesity, Class II, BMI 35-39.9 Yes    S/P laparoscopic sleeve gastrectomy     Nausea        Plan:   Will get annual bariatric labs.  Will schedule pt for EGD with [facility] to assess for inflammation, irritation, ulcers, h. Pylori, stricture.  Pt to follow-up with [facility] after scope.  Could not tolerate Reglan due to tardive Dyskinesia.   Encouraged patient to be sure to get plenty of lean protein per day through small frequent meals all with a protein source.   Activity restrictions: none.   Recommended patient be sure to get at least 70 grams of protein per day by eating small, frequent meals all with high lean protein choices. Be sure to limit/cut back on daily carbohydrate intake. Discussed with the patient the recommended amount of water per day to intake. Reviewed vitamin requirements. Be sure to do routine exercise consistently throughout the week, including both cardio and strength training.     Instructions / Recommendations: dietary  counseling recommended, recommended a daily protein intake of  grams, vitamin supplement(s) recommended, recommended exercising consistently throughout the week, behavior modifications recommended and instructed to call the office for concerns, questions, or problems.     The patient was instructed to follow up after scope .     The patient was counseled regarding. Total time spent during this encounter today was 30 minutes.

## 2024-05-07 ENCOUNTER — HOSPITAL ENCOUNTER (OUTPATIENT)
Facility: HOSPITAL | Age: 44
Setting detail: HOSPITAL OUTPATIENT SURGERY
Discharge: HOME | End: 2024-05-07
Attending: SURGERY | Admitting: SURGERY
Payer: COMMERCIAL

## 2024-05-07 ENCOUNTER — ANESTHESIA EVENT (OUTPATIENT)
Dept: GASTROENTEROLOGY | Facility: HOSPITAL | Age: 44
End: 2024-05-07
Payer: COMMERCIAL

## 2024-05-07 ENCOUNTER — ANESTHESIA (OUTPATIENT)
Dept: GASTROENTEROLOGY | Facility: HOSPITAL | Age: 44
End: 2024-05-07
Payer: COMMERCIAL

## 2024-05-07 VITALS
OXYGEN SATURATION: 97 % | HEART RATE: 60 BPM | SYSTOLIC BLOOD PRESSURE: 134 MMHG | TEMPERATURE: 97.8 F | BODY MASS INDEX: 39.79 KG/M2 | WEIGHT: 253.5 LBS | DIASTOLIC BLOOD PRESSURE: 78 MMHG | HEIGHT: 67 IN | RESPIRATION RATE: 16 BRPM

## 2024-05-07 DIAGNOSIS — R11.0 NAUSEA: ICD-10-CM

## 2024-05-07 DIAGNOSIS — K21.9 GASTROESOPHAGEAL REFLUX DISEASE, UNSPECIFIED WHETHER ESOPHAGITIS PRESENT: Primary | ICD-10-CM

## 2024-05-07 DIAGNOSIS — K21.9 GASTROESOPHAGEAL REFLUX DISEASE WITHOUT ESOPHAGITIS: ICD-10-CM

## 2024-05-07 DIAGNOSIS — Z98.84 S/P LAPAROSCOPIC SLEEVE GASTRECTOMY: ICD-10-CM

## 2024-05-07 PROCEDURE — 43245 EGD DILATE STRICTURE: CPT | Performed by: SURGERY

## 2024-05-07 PROCEDURE — 81025 URINE PREGNANCY TEST: CPT | Performed by: SURGERY

## 2024-05-07 PROCEDURE — 43239 EGD BIOPSY SINGLE/MULTIPLE: CPT | Performed by: SURGERY

## 2024-05-07 PROCEDURE — 25010000002 PROPOFOL 10 MG/ML EMULSION: Performed by: REGISTERED NURSE

## 2024-05-07 PROCEDURE — 87081 CULTURE SCREEN ONLY: CPT | Performed by: SURGERY

## 2024-05-07 PROCEDURE — 25810000003 LACTATED RINGERS PER 1000 ML: Performed by: PHYSICIAN ASSISTANT

## 2024-05-07 PROCEDURE — C1726 CATH, BAL DIL, NON-VASCULAR: HCPCS | Performed by: SURGERY

## 2024-05-07 RX ORDER — PROPOFOL 10 MG/ML
VIAL (ML) INTRAVENOUS AS NEEDED
Status: DISCONTINUED | OUTPATIENT
Start: 2024-05-07 | End: 2024-05-07 | Stop reason: SURG

## 2024-05-07 RX ORDER — SODIUM CHLORIDE, SODIUM LACTATE, POTASSIUM CHLORIDE, CALCIUM CHLORIDE 600; 310; 30; 20 MG/100ML; MG/100ML; MG/100ML; MG/100ML
30 INJECTION, SOLUTION INTRAVENOUS CONTINUOUS
Status: DISCONTINUED | OUTPATIENT
Start: 2024-05-07 | End: 2024-05-07 | Stop reason: HOSPADM

## 2024-05-07 RX ORDER — LIDOCAINE HYDROCHLORIDE 20 MG/ML
INJECTION, SOLUTION INFILTRATION; PERINEURAL AS NEEDED
Status: DISCONTINUED | OUTPATIENT
Start: 2024-05-07 | End: 2024-05-07 | Stop reason: SURG

## 2024-05-07 RX ORDER — ONDANSETRON 2 MG/ML
4 INJECTION INTRAMUSCULAR; INTRAVENOUS ONCE AS NEEDED
Status: DISCONTINUED | OUTPATIENT
Start: 2024-05-07 | End: 2024-05-07 | Stop reason: HOSPADM

## 2024-05-07 RX ADMIN — SODIUM CHLORIDE, POTASSIUM CHLORIDE, SODIUM LACTATE AND CALCIUM CHLORIDE 30 ML/HR: 600; 310; 30; 20 INJECTION, SOLUTION INTRAVENOUS at 06:55

## 2024-05-07 RX ADMIN — PROPOFOL 20 MG: 10 INJECTION, EMULSION INTRAVENOUS at 07:31

## 2024-05-07 RX ADMIN — PROPOFOL 100 MG: 10 INJECTION, EMULSION INTRAVENOUS at 07:29

## 2024-05-07 RX ADMIN — LIDOCAINE HYDROCHLORIDE 100 MG: 20 INJECTION, SOLUTION INFILTRATION; PERINEURAL at 07:28

## 2024-05-07 RX ADMIN — PROPOFOL 100 MG: 10 INJECTION, EMULSION INTRAVENOUS at 07:28

## 2024-05-08 LAB — UREASE TISS QL: NEGATIVE

## 2024-05-22 RX ORDER — HYOSCYAMINE SULFATE 0.12 MG/1
0.12 TABLET SUBLINGUAL EVERY 4 HOURS PRN
Qty: 120 EACH | Refills: 0 | Status: SHIPPED | OUTPATIENT
Start: 2024-05-22

## 2024-06-28 DIAGNOSIS — K21.9 GASTROESOPHAGEAL REFLUX DISEASE WITHOUT ESOPHAGITIS: ICD-10-CM

## 2024-07-01 RX ORDER — PANTOPRAZOLE SODIUM 20 MG/1
20 TABLET, DELAYED RELEASE ORAL 2 TIMES DAILY
Qty: 60 TABLET | Refills: 1 | Status: SHIPPED | OUTPATIENT
Start: 2024-07-01

## 2024-07-03 ENCOUNTER — HOSPITAL ENCOUNTER (OUTPATIENT)
Dept: GENERAL RADIOLOGY | Facility: HOSPITAL | Age: 44
Discharge: HOME | End: 2024-07-03
Admitting: SURGERY
Payer: COMMERCIAL

## 2024-07-03 PROCEDURE — 74240 X-RAY XM UPR GI TRC 1CNTRST: CPT

## 2024-07-03 RX ADMIN — BARIUM SULFATE 135 ML: 980 POWDER, FOR SUSPENSION ORAL at 10:55

## 2024-07-03 RX ADMIN — ANTACID/ANTIFLATULENT 1 PACKET: 380; 550; 10; 10 GRANULE, EFFERVESCENT ORAL at 10:55

## 2024-07-03 RX ADMIN — BARIUM SULFATE 183 ML: 960 POWDER, FOR SUSPENSION ORAL at 10:55

## 2024-07-11 ENCOUNTER — OFFICE VISIT (OUTPATIENT)
Dept: BARIATRICS/WEIGHT MGMT | Facility: CLINIC | Age: 44
End: 2024-07-11
Payer: COMMERCIAL

## 2024-07-11 VITALS
HEART RATE: 70 BPM | DIASTOLIC BLOOD PRESSURE: 65 MMHG | WEIGHT: 259 LBS | TEMPERATURE: 97.9 F | HEIGHT: 67 IN | SYSTOLIC BLOOD PRESSURE: 131 MMHG | BODY MASS INDEX: 40.65 KG/M2

## 2024-07-11 DIAGNOSIS — Z71.3 DIETARY COUNSELING: ICD-10-CM

## 2024-07-11 DIAGNOSIS — E66.9 OBESITY, CLASS II, BMI 35-39.9: ICD-10-CM

## 2024-07-11 DIAGNOSIS — R11.0 NAUSEA: Primary | ICD-10-CM

## 2024-07-11 DIAGNOSIS — R53.82 CHRONIC FATIGUE: ICD-10-CM

## 2024-07-11 DIAGNOSIS — Z98.84 S/P LAPAROSCOPIC SLEEVE GASTRECTOMY: ICD-10-CM

## 2024-07-11 DIAGNOSIS — I10 ESSENTIAL HYPERTENSION: ICD-10-CM

## 2024-07-11 DIAGNOSIS — K44.9 PARAESOPHAGEAL HERNIA: ICD-10-CM

## 2024-07-11 DIAGNOSIS — K21.9 GASTROESOPHAGEAL REFLUX DISEASE, UNSPECIFIED WHETHER ESOPHAGITIS PRESENT: ICD-10-CM

## 2024-07-11 DIAGNOSIS — R13.10 DYSPHAGIA, UNSPECIFIED TYPE: ICD-10-CM

## 2024-07-11 PROCEDURE — 99214 OFFICE O/P EST MOD 30 MIN: CPT | Performed by: SURGERY

## 2024-07-11 NOTE — PROGRESS NOTES
[facility] BARIATRIC SURGERY  [address]  [facility] [id number] [phone]  [address]  [facility] [address] [phone]  Dept: [phone]  7/11/2024      Lizette Connolly.  [id number]  [id number]  1980  female      Chief Complaint   Patient presents with    Follow-up     Fup UGI       BH Post-Op Bariatric Surgery:   Lizette Connolly is status post Laparoscopic Sleeve procedure, performed on 5/20/20     HPI:   Today's weight is 117 kg (259 lb) pounds, today's BMI is Body mass index is 40.56 kg/m²., a  gain of 4pounds since the last visit and weight loss since surgery is 73 pounds.    Lizette Connolly denies fever chills chest pain shortness of air and reports chronic nausea especially when eating, dysphagia with solids and heartburn.  Patient is on a PPI.  Patient did not tolerate Reglan secondary to side effects with tardive dyskinesia.  She has undergone upper endoscopy with dilatation of the pylorus without long-term relief.  Upper GI revealing small sliding hiatal hernia.  Patient would like to proceed with conversion to gastric bypass to decompress the stomach and had malabsorption to also help with weight loss.     Diet and Exercise: Diet history reviewed and discussed with the patient. Weight loss/gains to date discussed with the patient. The patient states they are eating around unknown grams of protein per day. She reports eating 3 meals per day, a typical portion size of 1 cup, eating 2 snacks per day, drinking 4 or more 8-oz. glasses of water per day, no carbonated beverage consumption and exercising regularly.     Supplements: Bariatric.     Review of Systems   Constitutional:  Positive for fatigue.   Gastrointestinal:  Positive for constipation and nausea.   Musculoskeletal:  Positive for arthralgias.   All other systems reviewed and are negative.        * No active hospital problems. *      Past Medical History:    Diagnosis Date    Anxiety and depression     GERD (gastroesophageal reflux disease)     Hip pain 01/07/2019    RIGHT    HPV in female     Hypertension     Kidney stones 01/07/2019    Lactose intolerance May 2020    After gadtric sleeve    Migraine     PCOS (polycystic ovarian syndrome)     Sleep apnea     GONE       The following portions of the patient's history were reviewed and updated as appropriate: allergies, current medications, past family history, past medical history, past social history, past surgical history, and problem list.    Vitals:    07/11/24 1409   BP: 131/65   Pulse: 70   Temp: 97.9 °F (36.6 °C)       Physical Exam  Constitutional:       Appearance: Normal appearance.   HENT:      Head: Normocephalic and atraumatic.   Eyes:      Conjunctiva/sclera: Conjunctivae normal.   Pulmonary:      Effort: Pulmonary effort is normal.   Abdominal:      General: Abdomen is flat.      Palpations: Abdomen is soft.   Neurological:      Mental Status: She is alert and oriented to person, place, and time.   Psychiatric:         Mood and Affect: Mood normal.         Behavior: Behavior normal.         Thought Content: Thought content normal.         Judgment: Judgment normal.         Assessment:   Post-op, the patient status post sleeve gastrectomy 2020 with chronic nausea, dysphagia with solids as well as heartburn not controlled well with PPI.  Patient would like to proceed with conversion to gastric bypass to decompress the stomach and add a malabsorptive type procedure to help with weight loss.  I went over all the different options including just repairing the hiatal hernia but does not think that this would resolve her symptoms.  She has undergone upper endoscopy as well as upper GI.  Medications not resolving her symptoms.  Also discussed doing small frequent meals which she will continue to do.  Start the process for conversion to gastric bypass questions answered.     Encounter Diagnoses   Name Primary?     Nausea Yes    Gastroesophageal reflux disease, unspecified whether esophagitis present     Dysphagia, unspecified type     S/P laparoscopic sleeve gastrectomy     Paraesophageal hernia     Obesity, Class II, BMI 35-39.9     Dietary counseling     Essential hypertension     Chronic fatigue        Plan:     Encouraged patient to be sure to get plenty of lean protein per day through small meals all with a protein source.   Activity restrictions: none.   Recommended patient be sure to get at least 70 grams of protein per day by eating small  meals all with high lean protein choices. Be sure to limit/cut back on daily carbohydrate intake. Discussed with the patient the recommended amount of water per day to intake. Reviewed vitamin requirements. Be sure to do routine exercise including strength training.    Instructed to call the office for concerns, questions, or problems.     The patient was instructed to follow up in at consult.     The patient was counseled regarding the above procedure. Total time spent on this encounter was  35 minutes including reviewing previous notes, lab results and face to face time spent with the patient.  The majority of time was spent counseling the patient regarding diet and exercise as well as reviewing their medications and their compliance with the procedure.

## 2024-08-27 RX ORDER — ONDANSETRON 4 MG/1
TABLET, ORALLY DISINTEGRATING ORAL
Qty: 60 TABLET | Refills: 3 | Status: SHIPPED | OUTPATIENT
Start: 2024-08-27 | End: 2024-08-29

## 2024-08-28 ENCOUNTER — LAB (OUTPATIENT)
Dept: LAB | Facility: HOSPITAL | Age: 44
End: 2024-08-28
Payer: COMMERCIAL

## 2024-08-28 ENCOUNTER — CONSULT (OUTPATIENT)
Dept: BARIATRICS/WEIGHT MGMT | Facility: CLINIC | Age: 44
End: 2024-08-28
Payer: COMMERCIAL

## 2024-08-28 VITALS
DIASTOLIC BLOOD PRESSURE: 86 MMHG | SYSTOLIC BLOOD PRESSURE: 140 MMHG | BODY MASS INDEX: 40.34 KG/M2 | WEIGHT: 251 LBS | HEIGHT: 66 IN | TEMPERATURE: 97.7 F | HEART RATE: 82 BPM

## 2024-08-28 DIAGNOSIS — K44.9 PARAESOPHAGEAL HERNIA: ICD-10-CM

## 2024-08-28 DIAGNOSIS — I10 ESSENTIAL HYPERTENSION: ICD-10-CM

## 2024-08-28 DIAGNOSIS — Z98.84 S/P LAPAROSCOPIC SLEEVE GASTRECTOMY: ICD-10-CM

## 2024-08-28 DIAGNOSIS — R11.0 NAUSEA: ICD-10-CM

## 2024-08-28 DIAGNOSIS — E66.01 OBESITY, CLASS III, BMI 40-49.9 (MORBID OBESITY): Primary | ICD-10-CM

## 2024-08-28 DIAGNOSIS — E66.9 OBESITY, CLASS II, BMI 35-39.9: ICD-10-CM

## 2024-08-28 PROBLEM — E66.812 OBESITY, CLASS II, BMI 35-39.9: Status: RESOLVED | Noted: 2020-11-06 | Resolved: 2024-08-28

## 2024-08-28 LAB
25(OH)D3 SERPL-MCNC: 61.4 NG/ML (ref 30–100)
ALBUMIN SERPL-MCNC: 4 G/DL (ref 3.5–5.2)
ALBUMIN/GLOB SERPL: 1.7 G/DL
ALP SERPL-CCNC: 119 U/L (ref 39–117)
ALT SERPL W P-5'-P-CCNC: 34 U/L (ref 1–33)
ANION GAP SERPL CALCULATED.3IONS-SCNC: 7 MMOL/L (ref 5–15)
AST SERPL-CCNC: 17 U/L (ref 1–32)
BASOPHILS # BLD AUTO: 0.04 10*3/MM3 (ref 0–0.2)
BASOPHILS NFR BLD AUTO: 0.6 % (ref 0–1.5)
BILIRUB SERPL-MCNC: 0.4 MG/DL (ref 0–1.2)
BUN SERPL-MCNC: 12 MG/DL (ref 6–20)
BUN/CREAT SERPL: 15.8 (ref 7–25)
CALCIUM SPEC-SCNC: 9.2 MG/DL (ref 8.6–10.5)
CHLORIDE SERPL-SCNC: 105 MMOL/L (ref 98–107)
CHOLEST SERPL-MCNC: 188 MG/DL (ref 0–200)
CO2 SERPL-SCNC: 30 MMOL/L (ref 22–29)
CREAT SERPL-MCNC: 0.76 MG/DL (ref 0.57–1)
DEPRECATED RDW RBC AUTO: 41 FL (ref 37–54)
EGFRCR SERPLBLD CKD-EPI 2021: 99.9 ML/MIN/1.73
EOSINOPHIL # BLD AUTO: 0.23 10*3/MM3 (ref 0–0.4)
EOSINOPHIL NFR BLD AUTO: 3.4 % (ref 0.3–6.2)
ERYTHROCYTE [DISTWIDTH] IN BLOOD BY AUTOMATED COUNT: 12.1 % (ref 12.3–15.4)
FERRITIN SERPL-MCNC: 95 NG/ML (ref 13–150)
FOLATE SERPL-MCNC: >20 NG/ML (ref 4.78–24.2)
GLOBULIN UR ELPH-MCNC: 2.4 GM/DL
GLUCOSE SERPL-MCNC: 86 MG/DL (ref 65–99)
HBA1C MFR BLD: 4.9 % (ref 4.8–5.6)
HCT VFR BLD AUTO: 43 % (ref 34–46.6)
HDLC SERPL-MCNC: 58 MG/DL (ref 40–60)
HGB BLD-MCNC: 14.2 G/DL (ref 12–15.9)
IMM GRANULOCYTES # BLD AUTO: 0.02 10*3/MM3 (ref 0–0.05)
IMM GRANULOCYTES NFR BLD AUTO: 0.3 % (ref 0–0.5)
IRON 24H UR-MRATE: 176 MCG/DL (ref 37–145)
LDLC SERPL CALC-MCNC: 103 MG/DL (ref 0–100)
LDLC/HDLC SERPL: 1.7 {RATIO}
LYMPHOCYTES # BLD AUTO: 2.26 10*3/MM3 (ref 0.7–3.1)
LYMPHOCYTES NFR BLD AUTO: 33.8 % (ref 19.6–45.3)
MCH RBC QN AUTO: 30.5 PG (ref 26.6–33)
MCHC RBC AUTO-ENTMCNC: 33 G/DL (ref 31.5–35.7)
MCV RBC AUTO: 92.3 FL (ref 79–97)
MONOCYTES # BLD AUTO: 0.42 10*3/MM3 (ref 0.1–0.9)
MONOCYTES NFR BLD AUTO: 6.3 % (ref 5–12)
NEUTROPHILS NFR BLD AUTO: 3.71 10*3/MM3 (ref 1.7–7)
NEUTROPHILS NFR BLD AUTO: 55.6 % (ref 42.7–76)
NRBC BLD AUTO-RTO: 0 /100 WBC (ref 0–0.2)
PLATELET # BLD AUTO: 284 10*3/MM3 (ref 140–450)
PMV BLD AUTO: 9.8 FL (ref 6–12)
POTASSIUM SERPL-SCNC: 4.5 MMOL/L (ref 3.5–5.2)
PREALB SERPL-MCNC: 30.7 MG/DL (ref 20–40)
PROT SERPL-MCNC: 6.4 G/DL (ref 6–8.5)
RBC # BLD AUTO: 4.66 10*6/MM3 (ref 3.77–5.28)
SODIUM SERPL-SCNC: 142 MMOL/L (ref 136–145)
TRIGL SERPL-MCNC: 157 MG/DL (ref 0–150)
TSH SERPL DL<=0.05 MIU/L-ACNC: 1.48 UIU/ML (ref 0.27–4.2)
VLDLC SERPL-MCNC: 27 MG/DL (ref 5–40)
WBC NRBC COR # BLD AUTO: 6.68 10*3/MM3 (ref 3.4–10.8)

## 2024-08-28 PROCEDURE — 83036 HEMOGLOBIN GLYCOSYLATED A1C: CPT

## 2024-08-28 PROCEDURE — 84425 ASSAY OF VITAMIN B-1: CPT

## 2024-08-28 PROCEDURE — 84134 ASSAY OF PREALBUMIN: CPT

## 2024-08-28 PROCEDURE — 83540 ASSAY OF IRON: CPT

## 2024-08-28 PROCEDURE — 80061 LIPID PANEL: CPT

## 2024-08-28 PROCEDURE — 36415 COLL VENOUS BLD VENIPUNCTURE: CPT

## 2024-08-28 PROCEDURE — 82306 VITAMIN D 25 HYDROXY: CPT

## 2024-08-28 PROCEDURE — 82728 ASSAY OF FERRITIN: CPT

## 2024-08-28 PROCEDURE — 82746 ASSAY OF FOLIC ACID SERUM: CPT

## 2024-08-28 PROCEDURE — 80050 GENERAL HEALTH PANEL: CPT

## 2024-08-28 PROCEDURE — 83921 ORGANIC ACID SINGLE QUANT: CPT

## 2024-08-28 RX ORDER — CEFUROXIME AXETIL 250 MG/1
TABLET ORAL
COMMUNITY
Start: 2024-07-18

## 2024-08-28 RX ORDER — MONTELUKAST SODIUM 4 MG/1
1 TABLET, CHEWABLE ORAL DAILY
Qty: 30 TABLET | Refills: 2 | Status: SHIPPED | OUTPATIENT
Start: 2024-08-28

## 2024-08-28 NOTE — PROGRESS NOTES
"Metabolic and Bariatric Surgery Nutrition Assessment    Patient Name: Lizette Connolly    YOB: 1980   Age: 44 y.o.  Sex: female  MRN: [id number]     ASSESSMENT    Procedure considering: bypass (conversion from sleeve)     Anthropometric Measurements  Estimated body mass index is 40.97 kg/m² as calculated from the following:    Height as of this encounter: 166.7 cm (65.63\").    Weight as of this encounter: 114 kg (251 lb).    Patient Goals and Expectations                        Patient's stated weight goal: 199 lbs  Reasons for desiring weight loss: health     Medical History  Pertinent diagnosis/problems: hypertension, dyslipidemia, PCOS, GERD, anxiety, depression   Past Medical History:   Diagnosis Date    Anxiety and depression     GERD (gastroesophageal reflux disease)     Hip pain 01/07/2019    RIGHT    HPV in female     Hypertension     Kidney stones 01/07/2019    Lactose intolerance May 2020    After gadtric sleeve    Migraine     PCOS (polycystic ovarian syndrome)     Sleep apnea     GONE     Past Surgical History:   Procedure Laterality Date    BARIATRIC SURGERY  May 2020    Gastric sleeve    COLONOSCOPY N/A 08/10/2022    Procedure: COLONOSCOPY TO CECUM AND TI wtih biopsies;  Surgeon: Cosmo Cordoba MD;  Location: [facility] ENDOSCOPY;  Service: Gastroenterology;  Laterality: N/A;  Pre: Abdominal pain, abnormal Ct  Post: diverticulosis, hemorrhoids    ENDOSCOPY  2010    ENDOSCOPY N/A 03/03/2020    Procedure: ESOPHAGOGASTRODUODENOSCOPY WITH BIOPSY;  Surgeon: Shady Betancourt Jr., MD;  Location: [facility] ENDOSCOPY;  Service: General;  Laterality: N/A;  PRE- GERD  POST- GASTRITIS    ENDOSCOPY N/A 04/27/2021    Procedure: ESOPHAGOGASTRODUODENOSCOPY WITH BALLOON DILATATION 18-20MM, BIOPSIES;  Surgeon: Shady Betancourt Jr., MD;  Location: [facility] ENDOSCOPY;  Service: General;  Laterality: N/A;  PRE-H/O SLEEVE, NAUSEA  POST- GASTRITIS    ENDOSCOPY N/A 11/16/2021    Procedure: " ESOPHAGOGASTRODUODENOSCOPY WITH PYLORIC DILATATION, BIOPSIES;  Surgeon: Shady Betancourt Jr., MD;  Location: [facility] ENDOSCOPY;  Service: General;  Laterality: N/A;  PRE- GERD, H/O SLEEVE  POST- GASTRITIS    ENDOSCOPY N/A 5/16/2023    Procedure: ESOPHAGOGASTRODUODENOSCOPY WITH COLD BIOPSIES, BALLOON DILATATION 18-20MM;  Surgeon: Shady Betancourt Jr., MD;  Location: [facility] ENDOSCOPY;  Service: General;  Laterality: N/A;  PRE- DYSPHAGIA, H/O SLEEVE  POST- GASTRITIS    ENDOSCOPY N/A 5/7/2024    Procedure: ESOPHAGOGASTRODUODENOSCOPY WITH BALLOON DILATATION AND BIOPSY;  Surgeon: Shady Betancourt Jr., MD;  Location: [facility] ENDOSCOPY;  Service: General;  Laterality: N/A;  PRE- NAUSEA, H/O SLEEVE  POST- GASTRITIS    GASTRIC SLEEVE LAPAROSCOPIC N/A 05/20/2020    Procedure: GASTRIC SLEEVE LAPAROSCOPIC;  Surgeon: Shady Betancourt Jr., MD;  [facility];  Service: Bariatric;  Laterality: N/A;    LAPAROSCOPIC CHOLECYSTECTOMY  2010    MANDIBLE SURGERY      ORIF ANKLE FRACTURE Right     UPPER GASTROINTESTINAL ENDOSCOPY  2021         Weight History   Highest adult weight: 340 lbs   Lowest adult weight: 230 lbs   Onset of obesity: Always been overweight    Possible triggers or life events that may have led to weight gain:   Persistent nausea      Previous Weight Loss Efforts   Patient has tried to lose weight in the past including:   Herbalife, calorie counting, high protein, low carbohydrate, low fat, prescription medications and exercise.       Patient has lost up to 100 lbs on diets, but was unable to maintain this long term.        Diet History   Patient is eating 2-3 meals and 2-3 snacks daily. Experiencing chronic nausea. Carbohydrate foods help settle stomach.       24 Hour Recall   Breakfast: Scrambled eggs   Lunch: Chicken wrap    Dinner:  cooks; spaghetti/meatballs, chicken/pork, starch, vegetables    Snacks: Crackers, chips, sweets   Beverages: Water        Eating out: 1-2 times per week     Vitamins/supplements: OTC MVI, B12, D, fish oil    Dietary restrictions: NKFA, lactose intolerant (since sleeve)      Patient identifies problem areas to be:   Grazing on high carbohydrate foods, eating out of boredom, inactivity.      Physical Activity   Work-related activity: Nurse 3 days week, otherwise more sedentary    Planned exercise: 1-3 times per week hiking/swimming/yard work    Barriers to activity: None      DIAGNOSIS   .  Nutrition problem statement: Obesity related to multifactorial biochemical, behavioral and environmental contributors to disease as evidenced by BMI 40.97 kg/m².    INTERVENTION    Pre and post op nutrition education and coaching for behavior change completed. Program materials provided. Emphasized the importance of taking in at least 70 g protein and 64 oz fluid daily. Discussed personal habits and lifestyle behaviors that may influence weight loss efforts. Self monitoring strategies such as keeping a food journal were encouraged. Patient verbalized understanding and questions were answered.    Patient will be evaluated by multidisciplinary team before undergoing a review of their candidacy. Additional nutrition counseling available and encouraged both pre and post op. Patient demonstrated a good comprehension of diet requirements and commitment to make healthy changes.     Lizette Connolly appears to be a suitable candidate for bariatric surgery from a nutritional standpoint.    MONITORING & EVALUATION    Short term goals:   Prioritize protein and aim for minimum of 70 grams   Practice mindfulness and portion control with snack foods       Total time spent during this encounter today was 25 minutes and includes preparing for the visit, reviewing tests, counseling and educating the patient/family/caregiver and documenting information in the medical record.    Electronically signed by:  Pema Cardenas RD   08/28/24 09:10 EDT

## 2024-08-28 NOTE — PROGRESS NOTES
[facility] BARIATRIC [facility] BARIATRIC SURGERY  [address]  [facility] [id number] [phone]  [address]  [facility] [address] [phone]  Dept: [phone]  8/28/2024      Lizette Connolly.  [id number]  [id number]  1980  female      Chief Complaint of weight gain; unable to maintain weight loss    History of Present Illness:   Lizette is a 44 y.o. female who presents today for evaluation, education and consultation regarding weight loss surgery. The patient is interested in the gastric bypass.      Diet History:Lizette has been overweight for at least 30 years, has been 35 pounds or more overweight for at least 30 years, has been 100 pounds or more overweight for 15 or more years and started dieting at age 17.  The most weight Lizette lost was 110 pounds with previous bariatric surgery and maintained the weight loss for 6-8 months. Lizette describes her eating habits as snacking between meals and eating more carbs. Lizette Connolly has tried Weight Watchers, Fasting, reduced calorie, exercising, prescription medications, previous weight loss surgery, meal replacement shakes, high protein, low carb, and calorie counting among others with success of losing up to 110 pounds, but in each instance regained the weight.  Her maximum lifetime weight is 340 pounds.    See dietician documentation for complete history.    340lbs before sleeve  230lbs lowest wt    5/20/2020- gastric sleeve with JSO  5/7/24-EGD- patchy erythema antrum and bile  7/3/24- UGI- small HH    Pt reports ongoing nausea since sleeve surgery.    Pt dx with small cerebral aneurysm earlier this year and sees neurology annually for MRI.    Goal wt 199#    Bariatric Surgery Evaluation: The patient is being seen for an initial visit for bariatric surgery evaluation.     Bariatric Co-morbidities:  hypertension and GERD    Patient Active Problem List   Diagnosis    Chronic fatigue    GERD  (gastroesophageal reflux disease)    Anxiety and depression    Obesity, Class III, BMI 40-49.9 (morbid obesity)    Essential hypertension    Vitamin D deficiency    PCOS (polycystic ovarian syndrome)    Hypertriglyceridemia    Migraine    Dietary counseling    Snoring    S/P laparoscopic sleeve gastrectomy    Nausea    Dysphagia    Abnormal CT scan, colon    Abnormal head CT    Vasospasm of cerebral artery    Paraesophageal hernia       Past Medical History:   Diagnosis Date    Anxiety and depression     GERD (gastroesophageal reflux disease)     Hip pain 01/07/2019    RIGHT    HPV in female     Hypertension     Kidney stones 01/07/2019    Lactose intolerance May 2020    After gadtric sleeve    Migraine     PCOS (polycystic ovarian syndrome)     Sleep apnea     GONE       Past Surgical History:   Procedure Laterality Date    BARIATRIC SURGERY  May 2020    Gastric sleeve    COLONOSCOPY N/A 08/10/2022    Procedure: COLONOSCOPY TO CECUM AND TI wtih biopsies;  Surgeon: Cosmo Cordoba MD;  Location: [facility] ENDOSCOPY;  Service: Gastroenterology;  Laterality: N/A;  Pre: Abdominal pain, abnormal Ct  Post: diverticulosis, hemorrhoids    ENDOSCOPY  2010    ENDOSCOPY N/A 03/03/2020    Procedure: ESOPHAGOGASTRODUODENOSCOPY WITH BIOPSY;  Surgeon: Shady Betancourt Jr., MD;  Location: [facility] ENDOSCOPY;  Service: General;  Laterality: N/A;  PRE- GERD  POST- GASTRITIS    ENDOSCOPY N/A 04/27/2021    Procedure: ESOPHAGOGASTRODUODENOSCOPY WITH BALLOON DILATATION 18-20MM, BIOPSIES;  Surgeon: Shady Betancourt Jr., MD;  Location: [facility] ENDOSCOPY;  Service: General;  Laterality: N/A;  PRE-H/O SLEEVE, NAUSEA  POST- GASTRITIS    ENDOSCOPY N/A 11/16/2021    Procedure: ESOPHAGOGASTRODUODENOSCOPY WITH PYLORIC DILATATION, BIOPSIES;  Surgeon: Shady Betancourt Jr., MD;  Location: [facility] ENDOSCOPY;  Service: General;  Laterality: N/A;  PRE- GERD, H/O SLEEVE  POST- GASTRITIS    ENDOSCOPY N/A 5/16/2023    Procedure:  ESOPHAGOGASTRODUODENOSCOPY WITH COLD BIOPSIES, BALLOON DILATATION 18-20MM;  Surgeon: Shady Betancourt Jr., MD;  Location:  [facility]OSCOPY;  Service: General;  Laterality: N/A;  PRE- DYSPHAGIA, H/O SLEEVE  POST- GASTRITIS    ENDOSCOPY N/A 5/7/2024    Procedure: ESOPHAGOGASTRODUODENOSCOPY WITH BALLOON DILATATION AND BIOPSY;  Surgeon: Shady Betancourt Jr., MD;  Location:  [facility]OS[facility];  Service: General;  Laterality: N/A;  PRE- NAUSEA, H/O SLEEVE  POST- GASTRITIS    GASTRIC SLEEVE LAPAROSCOPIC N/A 05/20/2020    Procedure: GASTRIC SLEEVE LAPAROSCOPIC;  Surgeon: Shady Betancourt Jr., MD;  Location:  [facility];  Service: Bariatric;  Laterality: N/A;    LAPAROSCOPIC CHOLECYSTECTOMY  2010    MANDIBLE SURGERY      ORIF ANKLE FRACTURE Right     UPPER GASTROINTESTINAL ENDOSCOPY  2021       Allergies   Allergen Reactions    Prochlorperazine Other (See Comments)     Severe agitation         Current Outpatient Medications:     albuterol sulfate  (90 Base) MCG/ACT inhaler, , Disp: , Rfl:     FLUoxetine (PROzac) 20 MG capsule, Take 3 capsules by mouth Daily., Disp: , Rfl:     fluticasone (FLONASE) 50 MCG/ACT nasal spray, , Disp: , Rfl:     Fremanezumab-vfrm (Ajovy) 225 MG/1.5ML solution auto-injector, Inject 225 mg under the skin into the appropriate area as directed Every 30 (Thirty) Days., Disp: , Rfl:     gabapentin (NEURONTIN) 100 MG capsule, Take 1 capsule by mouth 3 (Three) Times a Day., Disp: , Rfl:     hydrOXYzine (ATARAX) 25 MG tablet, Take 1 tablet by mouth As Needed., Disp: , Rfl:     levonorgestrel (MIRENA) 20 MCG/24HR IUD, 1 each by Intrauterine route 1 (One) Time., Disp: , Rfl:     linaclotide (Linzess) 145 MCG capsule capsule, Take 1 capsule by mouth Every Morning Before Breakfast., Disp: 30 capsule, Rfl: 11    losartan (COZAAR) 50 MG tablet, Take 1 tablet by mouth Daily., Disp: , Rfl:     Magnesium Oxide -Mg Supplement 400 (240 Mg) MG tablet, Take  by mouth Daily., Disp: , Rfl:     montelukast  (SINGULAIR) 10 MG tablet, Take 1 tablet by mouth Daily., Disp: , Rfl:     Omega-3 Fatty Acids (FISH OIL) 1000 MG capsule capsule, Take 1 capsule by mouth Daily With Breakfast. HELD FOR ENDO X 2 WEEKS, Disp: , Rfl:     ondansetron ODT (ZOFRAN-ODT) 4 MG disintegrating tablet, PLACE 1 TABLET BY MOUTH EVERY 8 HOURS AS NEEDED FOR NAUSEA AND/OR VOMITING, Disp: 60 tablet, Rfl: 3    pantoprazole (PROTONIX) 20 MG EC tablet, TAKE 1 TABLET BY MOUTH TWICE A DAY, Disp: 60 tablet, Rfl: 1    SUMAtriptan (IMITREX) 100 MG tablet, TAKE 1 TABLET BY MOUTH AT ONSET OF HEADACHE; MAY REPEAT 1 TABLET IN 2 HOURS IF NEEDED. MAX 2 TABLETS IN 24 HOURS, Disp: , Rfl:     SUMAtriptan Succinate (IMITREX) 6 MG/0.5ML injection, , Disp: , Rfl:     ubrogepant (UBRELVY) 100 MG tablet, Take 1 tablet by mouth As Needed., Disp: , Rfl:     Social History     Socioeconomic History    Marital status:     Number of children: 3   Tobacco Use    Smoking status: Never    Smokeless tobacco: Never   Vaping Use    Vaping status: Never Used   Substance and Sexual Activity    Alcohol use: Not Currently     Comment: OCCASSIONALLY    Drug use: No    Sexual activity: Defer       Family History   Problem Relation Age of Onset    Breast cancer Maternal Grandmother     Hypertension Maternal Grandmother     Cancer Maternal Grandmother         BREAST & BONE    Obesity Mother     Diabetes Mother     Hypertension Mother     Heart attack Mother     Heart disease Mother     Sleep apnea Mother     Hypertension Father     Heart disease Brother     Hypertension Maternal Grandfather     Heart disease Maternal Grandfather     Cancer Maternal Grandfather     Hypertension Paternal Grandmother     Multiple sclerosis Paternal Grandmother     Hypertension Paternal Grandfather     Cancer Paternal Grandfather         LUNG    Malig Hyperthermia Neg Hx          Review of Systems:  Review of Systems   Constitutional:  Positive for fatigue. Negative for unexpected weight change.   HENT:  Negative.     Respiratory: Negative.     Cardiovascular: Negative.    Gastrointestinal:  Positive for constipation and nausea.   Endocrine: Negative.    Genitourinary: Negative.    Musculoskeletal:  Negative for back pain.   Neurological: Negative.    Hematological: Negative.    Psychiatric/Behavioral: Negative.     All other systems reviewed and are negative.      Physical Exam:  Vital Signs:  Weight: 114 kg (251 lb)   Body mass index is 40.97 kg/m².  Temp: 97.7 °F (36.5 °C)   Heart Rate: 82   BP: 140/86     Physical Exam  Vitals reviewed.   Constitutional:       General: She is not in acute distress.     Appearance: Normal appearance. She is morbidly obese.   HENT:      Head: Normocephalic and atraumatic.      Mouth/Throat:      Mouth: Mucous membranes are moist.   Eyes:      General: No scleral icterus.     Pupils: Pupils are equal, round, and reactive to light.   Cardiovascular:      Rate and Rhythm: Normal rate and regular rhythm.      Heart sounds: Normal heart sounds. No murmur heard.     No gallop.   Pulmonary:      Effort: Pulmonary effort is normal. No respiratory distress.      Breath sounds: Normal breath sounds.   Abdominal:      General: Abdomen is flat. Bowel sounds are normal. There is no distension.      Palpations: Abdomen is soft. There is no mass.      Tenderness: There is no abdominal tenderness. There is no guarding.   Musculoskeletal:         General: Normal range of motion.      Cervical back: Normal range of motion and neck supple.   Skin:     General: Skin is warm and dry.   Neurological:      General: No focal deficit present.      Mental Status: She is alert and oriented to person, place, and time.   Psychiatric:         Mood and Affect: Mood normal.         Behavior: Behavior normal.            Assessment:         Lizette Connolly is a 44 y.o. year old female with medically complicated severe obesity. Weight: 114 kg (251 lb), Body mass index is 40.97 kg/m². and weight related problems  including hypertension and GERD.    I explained in detail, potential surgical options of interest to the patient including the RNY gastric bypass, sleeve gastrectomy, and gastric band while considering the patient's medical history. At this time, the patient expressed interest in the gastric bypass.  All of those procedures can be performed laparoscopically but there is a chance to convert to open if any technical challenges or complications do occur.  Bariatric surgery is not cosmetic surgery but rather a tool to help a patient make a life-long commitment lifestyle changes including diet, exercise, behavior changes, and taking supplemental vitamins and minerals. The risks, benefits, alternatives, and potential complications of all of the procedures were explained in detail including but not limited to failure to lose weight or gain weight and change in body image, metabolic complications. The patient was informed that surgery is a tool and requires lifestyle change including dietary modification to be successful. The patient was made aware of the need for vitamin supplementation after surgery due to the risk of deficiencies including but not limited to calcium, thiamine, vitamin B12, folate, iron, and anemia.    The patient was advised to start a high protein, low fat and low carbohydrate diet. The patient was given individualized information by our dietician along with handouts. The patient was encouraged to start routine exercise including but not limited to 150 minutes per week. The patient received a resistance band along with a handout of exercises.     The patient was given information regarding the MARIANNA educational video. MARIANNA is an internet based educational video which explains the surgical procedure and answers basic questions regarding the procedure. The patient was provided with instructions and a password to watch the video.    Due to the patient's BMI, history and co-morbidities they are at a high  risk for surgery and will obtain the following:  -The patient has been advised that a letter of medical support and a history and physical must be obtained from her primary care physician. The patient was given a copy of a sample form, that will suffice as their letter to take to their primary are provider.  -A referral for pre-operative psychological evaluation was ordered for the patient to evaluate candidacy as well as provide mental health support, should it be warranted before or after surgery      -Pre-operative testing will be ordered to include: Bariatric Lab panel will be drawn.    -Previous results of testing were reviewed including CXR-1/24/24.     Lizette Connolly will obtain a pre-operative clearance from a cardiologist prior to surgery.     Lizette Connolly will obtain a pre-operative clearance from her neurologist prior to surgery given new dx of cerebral aneurysm.    -Lizette Connolly had EGD with JSO 5/7/24.    Lizette Connolly was screened for sleep apnea in our office today and based on their results 2/8 low risk for JOSE. The risks, as they relate to chronic hypercapnia r/t untreated JOSE were discussed with the patient and they verbalized understanding.     The patient understands the surgical procedures and the different surgical options that are available.  She understands the lifestyle changes that would be required after surgery and has agreed to participate in a pre-operative and postoperative weight management program.  She also expressed understanding of possible risks, had several questions answered and desires to proceed.    I think she is a good candidate for this surgery, and is interested in a gastric bypass.    Encounter Diagnoses   Name Primary?    Obesity, Class III, BMI 40-49.9 (morbid obesity) Yes    S/P laparoscopic sleeve gastrectomy     Essential hypertension     Nausea     Paraesophageal hernia        Plan:  Discussed with pt trying a month of Colestid to see if this helps  with bile reflux/nausea.    The consultation plan was reviewed with the patient.  Patient will have evaluations and follow up with bariatric dieticians and a psychologist before undergoing a multidisciplinary review of her candidacy.  We also discussed the weight loss requirement and rationale, as well as other program requirements to ensure the safest approach to surgery. We spent time discussing different surgical procedures and plan of care throughout their lifespan to ensure long term success in achieving and maintaining a healthier weight. Patient will proceed with preoperative lab work, radiology, and endoscopy after obtaining agreed upon clearances and letter of support from their primary care provider.       Total encounter exceeded 60 minutes including reviewing their chart/outside medical records/previous visits, face to face time obtaining medical history and physical, reviewing surgical options and answering any questions, discussing pre-operative plan and requirements along with care coordination.         Danielle Strong PA-C  8/28/2024

## 2024-08-29 RX ORDER — ONDANSETRON 8 MG/1
8 TABLET, FILM COATED ORAL EVERY 8 HOURS PRN
Qty: 30 TABLET | Refills: 0 | Status: SHIPPED | OUTPATIENT
Start: 2024-08-29

## 2024-08-31 DIAGNOSIS — K21.9 GASTROESOPHAGEAL REFLUX DISEASE WITHOUT ESOPHAGITIS: ICD-10-CM

## 2024-09-01 LAB — METHYLMALONATE SERPL-SCNC: 159 NMOL/L (ref 0–378)

## 2024-09-02 RX ORDER — PANTOPRAZOLE SODIUM 20 MG/1
20 TABLET, DELAYED RELEASE ORAL 2 TIMES DAILY
Qty: 60 TABLET | Refills: 1 | Status: SHIPPED | OUTPATIENT
Start: 2024-09-02

## 2024-09-03 LAB — VIT B1 BLD-SCNC: 175.2 NMOL/L (ref 66.5–200)

## 2024-09-23 ENCOUNTER — TELEPHONE (OUTPATIENT)
Dept: BARIATRICS/WEIGHT MGMT | Facility: CLINIC | Age: 44
End: 2024-09-23
Payer: COMMERCIAL

## 2024-10-01 ENCOUNTER — OFFICE VISIT (OUTPATIENT)
Age: 44
End: 2024-10-01
Payer: COMMERCIAL

## 2024-10-01 VITALS
HEART RATE: 65 BPM | BODY MASS INDEX: 40.34 KG/M2 | HEIGHT: 67 IN | SYSTOLIC BLOOD PRESSURE: 134 MMHG | DIASTOLIC BLOOD PRESSURE: 76 MMHG | OXYGEN SATURATION: 100 % | WEIGHT: 257 LBS

## 2024-10-01 DIAGNOSIS — Z01.810 PRE-OPERATIVE CARDIOVASCULAR EXAMINATION: Primary | ICD-10-CM

## 2024-10-01 DIAGNOSIS — I10 ESSENTIAL HYPERTENSION: ICD-10-CM

## 2024-10-01 DIAGNOSIS — E66.01 OBESITY, CLASS III, BMI 40-49.9 (MORBID OBESITY): ICD-10-CM

## 2024-10-01 NOTE — PROGRESS NOTES
Subjective:     Encounter Date:10/01/2024      Patient ID: Lizette Connolly is a 44 y.o. female.    Chief Complaint:  ***    HPI:   44 y.o. female with hypertension    Pre-operative gastric sleeve      Mother- aortic aneurysm, mitral valve stenosis  Father- AF    The following portions of the patient's history were reviewed and updated as appropriate: allergies, current medications, past family history, past medical history, past social history, past surgical history and problem list.     REVIEW OF SYSTEMS:   All systems reviewed.  Pertinent positives identified in HPI.  All other systems are negative.    Past Medical History:   Diagnosis Date    Anxiety and depression     GERD (gastroesophageal reflux disease)     Hip pain 01/07/2019    RIGHT    HPV in female     Hypertension     Kidney stones 01/07/2019    Lactose intolerance May 2020    After gadtric sleeve    Migraine     PCOS (polycystic ovarian syndrome)     Sleep apnea     GONE       Family History   Problem Relation Age of Onset    Breast cancer Maternal Grandmother     Hypertension Maternal Grandmother     Cancer Maternal Grandmother         BREAST & BONE    Obesity Mother     Diabetes Mother     Hypertension Mother     Heart attack Mother     Heart disease Mother     Sleep apnea Mother     Hypertension Father     Heart disease Brother     Hypertension Maternal Grandfather     Heart disease Maternal Grandfather     Cancer Maternal Grandfather     Hypertension Paternal Grandmother     Multiple sclerosis Paternal Grandmother     Hypertension Paternal Grandfather     Cancer Paternal Grandfather         LUNG    Malig Hyperthermia Neg Hx        Social History     Socioeconomic History    Marital status:     Number of children: 3   Tobacco Use    Smoking status: Never    Smokeless tobacco: Never   Vaping Use    Vaping status: Never Used   Substance and Sexual Activity    Alcohol use: Not Currently     Comment: OCCASSIONALLY    Drug use: No    Sexual  activity: Defer       Allergies   Allergen Reactions    Prochlorperazine Other (See Comments)     Severe agitation       Past Surgical History:   Procedure Laterality Date    BARIATRIC SURGERY  May 2020    Gastric sleeve    COLONOSCOPY N/A 08/10/2022    Procedure: COLONOSCOPY TO CECUM AND TI wtih biopsies;  Surgeon: Cosmo Cordoba MD;  Location: [facility] ENDOSCOPY;  Service: Gastroenterology;  Laterality: N/A;  Pre: Abdominal pain, abnormal Ct  Post: diverticulosis, hemorrhoids    ENDOSCOPY  2010    ENDOSCOPY N/A 03/03/2020    Procedure: ESOPHAGOGASTRODUODENOSCOPY WITH BIOPSY;  Surgeon: Shady Betancourt Jr., MD;  Location: [facility] ENDOSCOPY;  Service: General;  Laterality: N/A;  PRE- GERD  POST- GASTRITIS    ENDOSCOPY N/A 04/27/2021    Procedure: ESOPHAGOGASTRODUODENOSCOPY WITH BALLOON DILATATION 18-20MM, BIOPSIES;  Surgeon: Shady Betancourt Jr., MD;  Location: [facility] ENDOSCOPY;  Service: General;  Laterality: N/A;  PRE-H/O SLEEVE, NAUSEA  POST- GASTRITIS    ENDOSCOPY N/A 11/16/2021    Procedure: ESOPHAGOGASTRODUODENOSCOPY WITH PYLORIC DILATATION, BIOPSIES;  Surgeon: Shady Betancourt Jr., MD;  Location: [facility] ENDOSCOPY;  Service: General;  Laterality: N/A;  PRE- GERD, H/O SLEEVE  POST- GASTRITIS    ENDOSCOPY N/A 5/16/2023    Procedure: ESOPHAGOGASTRODUODENOSCOPY WITH COLD BIOPSIES, BALLOON DILATATION 18-20MM;  Surgeon: Shady Betancourt Jr., MD;  Location: [facility] ENDOSCOPY;  Service: General;  Laterality: N/A;  PRE- DYSPHAGIA, H/O SLEEVE  POST- GASTRITIS    ENDOSCOPY N/A 5/7/2024    Procedure: ESOPHAGOGASTRODUODENOSCOPY WITH BALLOON DILATATION AND BIOPSY;  Surgeon: Shady Betancourt Jr., MD;  Location: [facility] ENDOSCOPY;  Service: General;  Laterality: N/A;  PRE- NAUSEA, H/O SLEEVE  POST- GASTRITIS    GASTRIC SLEEVE LAPAROSCOPIC N/A 05/20/2020    Procedure: GASTRIC SLEEVE LAPAROSCOPIC;  Surgeon: Shady Betancourt Jr., MD;  Location: [facility];  Service: Bariatric;  Laterality: N/A;     LAPAROSCOPIC CHOLECYSTECTOMY  2010    MANDIBLE SURGERY      ORIF ANKLE FRACTURE Right     UPPER GASTROINTESTINAL ENDOSCOPY  2021       Procedures       Objective:         Vitals:    10/01/24 1308   BP: 134/76   Pulse: 65   SpO2: 100%       PHYSICAL EXAM:  GEN: well appearing, in NAD   HEENT: NCAT, EOMI, moist mucus membranes   Respiratory: CTAB, no wheezes, rales or rhonchi  CV: normal rate, regular rhythm, normal S1, S2, no murmurs, rubs, gallops, +2 radial pulses b/l  GI: soft, nontender, nondistended  MSK: no edema  Skin: no rash, warm, dry  Heme/Lymph: no bruising or bleeding  Neuro: Alert and Oriented x 3, grossly normal motor function        Assessment:         No diagnosis found.       Plan:           KERRY Lucas, thank you very much for referring this kind patient to me. Please call me with any questions or concerns. I will see the patient again in the office {followup:[id number]}         Manish Garcia MD, [facility]  10/01/24  [facility]    Outpatient Encounter Medications as of 10/1/2024   Medication Sig Dispense Refill    albuterol sulfate  (90 Base) MCG/ACT inhaler       colestipol (Colestid) 1 g tablet Take 1 tablet by mouth Daily. 30 tablet 2    FLUoxetine (PROzac) 20 MG capsule Take 3 capsules by mouth Daily.      fluticasone (FLONASE) 50 MCG/ACT nasal spray       Fremanezumab-vfrm (Ajovy) 225 MG/1.5ML solution auto-injector Inject 225 mg under the skin into the appropriate area as directed Every 30 (Thirty) Days.      gabapentin (NEURONTIN) 100 MG capsule Take 1 capsule by mouth 3 (Three) Times a Day.      hydrOXYzine (ATARAX) 25 MG tablet Take 1 tablet by mouth As Needed.      levonorgestrel (MIRENA) 20 MCG/24HR IUD 1 each by Intrauterine route 1 (One) Time.      linaclotide (Linzess) 145 MCG capsule capsule Take 1 capsule by mouth Every Morning Before Breakfast. 30 capsule 11    losartan (COZAAR) 50 MG tablet Take 1 tablet by mouth Daily.      Magnesium Oxide -Mg Supplement  400 (240 Mg) MG tablet Take  by mouth Daily.      montelukast (SINGULAIR) 10 MG tablet Take 1 tablet by mouth Daily.      Omega-3 Fatty Acids (FISH OIL) 1000 MG capsule capsule Take 1 capsule by mouth Daily With Breakfast. HELD FOR ENDO X 2 WEEKS      ondansetron (Zofran) 8 MG tablet Take 1 tablet by mouth Every 8 (Eight) Hours As Needed for Nausea or Vomiting. 30 tablet 0    pantoprazole (PROTONIX) 20 MG EC tablet TAKE 1 TABLET BY MOUTH 2 TIMES A DAY 60 tablet 1    SUMAtriptan (IMITREX) 100 MG tablet TAKE 1 TABLET BY MOUTH AT ONSET OF HEADACHE; MAY REPEAT 1 TABLET IN 2 HOURS IF NEEDED. MAX 2 TABLETS IN 24 HOURS      SUMAtriptan Succinate (IMITREX) 6 MG/0.5ML injection       ubrogepant (UBRELVY) 100 MG tablet Take 1 tablet by mouth As Needed.       No facility-administered encounter medications on file as of 10/1/2024.

## 2024-10-01 NOTE — PROGRESS NOTES
Subjective:     Encounter Date:10/01/2024      Patient ID: Lizette Connolly is a 44 y.o. female.    Chief Complaint:  Preoperative cardiovascular examination    HPI:   44 y.o. female with hypertension, hyperlipidemia, anxiety, GERD who presents for preoperative cardiovascular examination prior to gastric sleeve.  She notes that she typically tries to hike and walks her dogs.  She notes that she can go up 2 flights of stairs without stopping though may be winded at the top.  She denies any chest pain with activity.    The following portions of the patient's history were reviewed and updated as appropriate: allergies, current medications, past family history, past medical history, past social history, past surgical history and problem list.     REVIEW OF SYSTEMS:   All systems reviewed.  Pertinent positives identified in HPI.  All other systems are negative.    Past Medical History:   Diagnosis Date    Anxiety and depression     GERD (gastroesophageal reflux disease)     Hip pain 01/07/2019    RIGHT    HPV in female     Hypertension     Kidney stones 01/07/2019    Lactose intolerance May 2020    After gadtric sleeve    Migraine     PCOS (polycystic ovarian syndrome)     Sleep apnea     GONE       Family History   Problem Relation Age of Onset    Breast cancer Maternal Grandmother     Hypertension Maternal Grandmother     Cancer Maternal Grandmother         BREAST & BONE    Obesity Mother     Diabetes Mother     Hypertension Mother     Heart attack Mother     Heart disease Mother     Sleep apnea Mother     Hypertension Father     Heart disease Brother     Hypertension Maternal Grandfather     Heart disease Maternal Grandfather     Cancer Maternal Grandfather     Hypertension Paternal Grandmother     Multiple sclerosis Paternal Grandmother     Hypertension Paternal Grandfather     Cancer Paternal Grandfather         LUNG    Malig Hyperthermia Neg Hx        Social History     Socioeconomic History    Marital status:      Number of children: 3   Tobacco Use    Smoking status: Never    Smokeless tobacco: Never   Vaping Use    Vaping status: Never Used   Substance and Sexual Activity    Alcohol use: Not Currently     Comment: OCCASSIONALLY    Drug use: No    Sexual activity: Defer       Allergies   Allergen Reactions    Prochlorperazine Other (See Comments)     Severe agitation       Past Surgical History:   Procedure Laterality Date    BARIATRIC SURGERY  May 2020    Gastric sleeve    COLONOSCOPY N/A 08/10/2022    Procedure: COLONOSCOPY TO CECUM AND TI wtih biopsies;  Surgeon: Cosmo Cordoba MD;  Location: [facility] ENDOSCOPY;  Service: Gastroenterology;  Laterality: N/A;  Pre: Abdominal pain, abnormal Ct  Post: diverticulosis, hemorrhoids    ENDOSCOPY  2010    ENDOSCOPY N/A 03/03/2020    Procedure: ESOPHAGOGASTRODUODENOSCOPY WITH BIOPSY;  Surgeon: Shady Betancorut Jr., MD;  Location: [facility] ENDOSCOPY;  Service: General;  Laterality: N/A;  PRE- GERD  POST- GASTRITIS    ENDOSCOPY N/A 04/27/2021    Procedure: ESOPHAGOGASTRODUODENOSCOPY WITH BALLOON DILATATION 18-20MM, BIOPSIES;  Surgeon: Shady Betancourt Jr., MD;  Location: [facility] ENDOSCOPY;  Service: General;  Laterality: N/A;  PRE-H/O SLEEVE, NAUSEA  POST- GASTRITIS    ENDOSCOPY N/A 11/16/2021    Procedure: ESOPHAGOGASTRODUODENOSCOPY WITH PYLORIC DILATATION, BIOPSIES;  Surgeon: Shady Betancourt Jr., MD;  Location: [facility] ENDOSCOPY;  Service: General;  Laterality: N/A;  PRE- GERD, H/O SLEEVE  POST- GASTRITIS    ENDOSCOPY N/A 5/16/2023    Procedure: ESOPHAGOGASTRODUODENOSCOPY WITH COLD BIOPSIES, BALLOON DILATATION 18-20MM;  Surgeon: Shady Betancourt Jr., MD;  Location: [facility] ENDOSCOPY;  Service: General;  Laterality: N/A;  PRE- DYSPHAGIA, H/O SLEEVE  POST- GASTRITIS    ENDOSCOPY N/A 5/7/2024    Procedure: ESOPHAGOGASTRODUODENOSCOPY WITH BALLOON DILATATION AND BIOPSY;  Surgeon: Shady Betancourt Jr., MD;  Location: [facility] ENDOSCOPY;  Service: General;   Laterality: N/A;  PRE- NAUSEA, H/O SLEEVE  POST- GASTRITIS    GASTRIC SLEEVE LAPAROSCOPIC N/A 05/20/2020    Procedure: GASTRIC SLEEVE LAPAROSCOPIC;  Surgeon: Shady Betancourt Jr., MD;  [facility] [place];  Service: Bariatric;  Laterality: N/A;    LAPAROSCOPIC CHOLECYSTECTOMY  2010    MANDIBLE SURGERY      ORIF ANKLE FRACTURE Right     UPPER GASTROINTESTINAL ENDOSCOPY  2021         ECG 12 Lead    Date/Time: 10/1/2024 1:27 PM  Performed by: Manish Foster MD    Authorized by: Manish Foster MD  Comparison: compared with previous ECG from 1/24/2024  Similar to previous ECG  Rhythm: sinus rhythm  Rate: normal  Conduction: 1st degree AV block  ST Segments: ST segments normal  T Waves: T waves normal  QRS axis: normal  Other: no other findings    Clinical impression: non-specific ECG             Objective:         Vitals:    10/01/24 1308   BP: 134/76   Pulse: 65   SpO2: 100%       PHYSICAL EXAM:  GEN: well appearing, in NAD   HEENT: NCAT, EOMI, moist mucus membranes   Respiratory: CTAB, no wheezes, rales or rhonchi  CV: normal rate, regular rhythm, normal S1, S2, no murmurs, rubs, gallops, +2 radial pulses b/l  GI: soft, nontender, nondistended  MSK: no edema  Skin: no rash, warm, dry  Heme/Lymph: no bruising or bleeding  Neuro: Alert and Oriented x 3, grossly normal motor function        Assessment:         (Z01.810) Pre-operative cardiovascular examination    (E66.01) Obesity, Class III, BMI 40-49.9 (morbid obesity)    (I10) Essential hypertension    44 y.o. female with hypertension, hyperlipidemia, anxiety, GERD who presents for preoperative cardiovascular examination prior to gastric sleeve.       Plan:       #Preoperative cardiovascular examination  Her exam is benign and she has no murmurs.  Her EKG reveals a first-degree AV delay but is otherwise normal.  She states that she can complete greater than 4 METS without any limitations.  She is at intermediate risk for this intermediate risk procedure and can proceed  without any further cardiac testing.    KERRY Lucas, thank you very much for referring this kind patient to me. Please call me with any questions or concerns. I will see the patient again in the office as needed         Manish Garcia MD, [facility], [facility]  10/01/24  [facility]    Outpatient Encounter Medications as of 10/1/2024   Medication Sig Dispense Refill    albuterol sulfate  (90 Base) MCG/ACT inhaler       colestipol (Colestid) 1 g tablet Take 1 tablet by mouth Daily. 30 tablet 2    FLUoxetine (PROzac) 20 MG capsule Take 3 capsules by mouth Daily.      fluticasone (FLONASE) 50 MCG/ACT nasal spray       Fremanezumab-vfrm (Ajovy) 225 MG/1.5ML solution auto-injector Inject 225 mg under the skin into the appropriate area as directed Every 30 (Thirty) Days.      gabapentin (NEURONTIN) 100 MG capsule Take 1 capsule by mouth 3 (Three) Times a Day.      hydrOXYzine (ATARAX) 25 MG tablet Take 1 tablet by mouth As Needed.      levonorgestrel (MIRENA) 20 MCG/24HR IUD 1 each by Intrauterine route 1 (One) Time.      linaclotide (Linzess) 145 MCG capsule capsule Take 1 capsule by mouth Every Morning Before Breakfast. 30 capsule 11    losartan (COZAAR) 50 MG tablet Take 1 tablet by mouth Daily.      Magnesium Oxide -Mg Supplement 400 (240 Mg) MG tablet Take  by mouth Daily.      montelukast (SINGULAIR) 10 MG tablet Take 1 tablet by mouth Daily.      Omega-3 Fatty Acids (FISH OIL) 1000 MG capsule capsule Take 1 capsule by mouth Daily With Breakfast. HELD FOR ENDO X 2 WEEKS      ondansetron (Zofran) 8 MG tablet Take 1 tablet by mouth Every 8 (Eight) Hours As Needed for Nausea or Vomiting. 30 tablet 0    pantoprazole (PROTONIX) 20 MG EC tablet TAKE 1 TABLET BY MOUTH 2 TIMES A DAY 60 tablet 1    SUMAtriptan (IMITREX) 100 MG tablet TAKE 1 TABLET BY MOUTH AT ONSET OF HEADACHE; MAY REPEAT 1 TABLET IN 2 HOURS IF NEEDED. MAX 2 TABLETS IN 24 HOURS      SUMAtriptan Succinate (IMITREX) 6 MG/0.5ML injection        ubrogepant (UBRELVY) 100 MG tablet Take 1 tablet by mouth As Needed.       No facility-administered encounter medications on file as of 10/1/2024.

## 2024-10-30 DIAGNOSIS — K21.9 GASTROESOPHAGEAL REFLUX DISEASE WITHOUT ESOPHAGITIS: ICD-10-CM

## 2024-11-04 RX ORDER — PANTOPRAZOLE SODIUM 20 MG/1
20 TABLET, DELAYED RELEASE ORAL 2 TIMES DAILY
Qty: 60 TABLET | Refills: 1 | Status: SHIPPED | OUTPATIENT
Start: 2024-11-04

## 2024-11-22 ENCOUNTER — PREP FOR SURGERY (OUTPATIENT)
Dept: OTHER | Facility: HOSPITAL | Age: 44
End: 2024-11-22
Payer: COMMERCIAL

## 2024-11-22 DIAGNOSIS — E66.01 OBESITY, CLASS III, BMI 40-49.9 (MORBID OBESITY): Primary | ICD-10-CM

## 2024-11-22 RX ORDER — SODIUM CHLORIDE 0.9 % (FLUSH) 0.9 %
1-10 SYRINGE (ML) INJECTION AS NEEDED
OUTPATIENT
Start: 2024-11-22

## 2024-11-22 RX ORDER — PANTOPRAZOLE SODIUM 40 MG/10ML
40 INJECTION, POWDER, LYOPHILIZED, FOR SOLUTION INTRAVENOUS ONCE
OUTPATIENT
Start: 2024-11-22 | End: 2024-11-22

## 2024-11-22 RX ORDER — CHLORHEXIDINE GLUCONATE ORAL RINSE 1.2 MG/ML
15 SOLUTION DENTAL SEE ADMIN INSTRUCTIONS
OUTPATIENT
Start: 2024-11-22

## 2024-11-22 RX ORDER — SODIUM CHLORIDE 9 MG/ML
40 INJECTION, SOLUTION INTRAVENOUS AS NEEDED
OUTPATIENT
Start: 2024-11-22

## 2024-11-22 RX ORDER — SCOLOPAMINE TRANSDERMAL SYSTEM 1 MG/1
1 PATCH, EXTENDED RELEASE TRANSDERMAL CONTINUOUS
OUTPATIENT
Start: 2024-11-22 | End: 2024-11-25

## 2024-11-22 RX ORDER — SODIUM CHLORIDE, SODIUM LACTATE, POTASSIUM CHLORIDE, CALCIUM CHLORIDE 600; 310; 30; 20 MG/100ML; MG/100ML; MG/100ML; MG/100ML
100 INJECTION, SOLUTION INTRAVENOUS CONTINUOUS
OUTPATIENT
Start: 2024-11-22 | End: 2024-11-23

## 2024-11-22 RX ORDER — SODIUM CHLORIDE 0.9 % (FLUSH) 0.9 %
10 SYRINGE (ML) INJECTION EVERY 12 HOURS SCHEDULED
OUTPATIENT
Start: 2024-11-22

## 2024-11-22 RX ORDER — METOCLOPRAMIDE HYDROCHLORIDE 5 MG/ML
10 INJECTION INTRAMUSCULAR; INTRAVENOUS ONCE
OUTPATIENT
Start: 2024-11-22 | End: 2024-11-22

## 2024-11-22 RX ORDER — GABAPENTIN 300 MG/1
600 CAPSULE ORAL ONCE
OUTPATIENT
Start: 2024-11-22 | End: 2024-11-22

## 2024-11-26 RX ORDER — MONTELUKAST SODIUM 4 MG/1
1 TABLET, CHEWABLE ORAL DAILY
Qty: 30 TABLET | Refills: 2 | Status: SHIPPED | OUTPATIENT
Start: 2024-11-26

## 2024-11-26 RX ORDER — ONDANSETRON 8 MG/1
TABLET, FILM COATED ORAL
Qty: 30 TABLET | Refills: 0 | Status: SHIPPED | OUTPATIENT
Start: 2024-11-26

## 2024-11-27 ENCOUNTER — CONSULT (OUTPATIENT)
Dept: BARIATRICS/WEIGHT MGMT | Facility: CLINIC | Age: 44
End: 2024-11-27
Payer: COMMERCIAL

## 2024-11-27 VITALS
HEART RATE: 82 BPM | WEIGHT: 259 LBS | HEIGHT: 66 IN | SYSTOLIC BLOOD PRESSURE: 150 MMHG | DIASTOLIC BLOOD PRESSURE: 90 MMHG | BODY MASS INDEX: 41.62 KG/M2 | TEMPERATURE: 97.3 F

## 2024-11-27 DIAGNOSIS — I10 ESSENTIAL HYPERTENSION: ICD-10-CM

## 2024-11-27 DIAGNOSIS — R13.10 DYSPHAGIA, UNSPECIFIED TYPE: ICD-10-CM

## 2024-11-27 DIAGNOSIS — F32.A ANXIETY AND DEPRESSION: ICD-10-CM

## 2024-11-27 DIAGNOSIS — E28.2 PCOS (POLYCYSTIC OVARIAN SYNDROME): ICD-10-CM

## 2024-11-27 DIAGNOSIS — F41.9 ANXIETY AND DEPRESSION: ICD-10-CM

## 2024-11-27 DIAGNOSIS — Z98.84 S/P LAPAROSCOPIC SLEEVE GASTRECTOMY: ICD-10-CM

## 2024-11-27 DIAGNOSIS — E66.01 OBESITY, CLASS III, BMI 40-49.9 (MORBID OBESITY): Primary | ICD-10-CM

## 2024-11-27 DIAGNOSIS — Z71.3 DIETARY COUNSELING: ICD-10-CM

## 2024-11-27 DIAGNOSIS — K21.9 GASTROESOPHAGEAL REFLUX DISEASE, UNSPECIFIED WHETHER ESOPHAGITIS PRESENT: ICD-10-CM

## 2024-11-27 DIAGNOSIS — K44.9 PARAESOPHAGEAL HERNIA: ICD-10-CM

## 2024-11-27 RX ORDER — ENOXAPARIN SODIUM 100 MG/ML
40 INJECTION SUBCUTANEOUS EVERY 12 HOURS SCHEDULED
Qty: 11.2 ML | Refills: 0 | Status: SHIPPED | OUTPATIENT
Start: 2024-11-27 | End: 2024-12-11

## 2024-11-27 NOTE — PATIENT INSTRUCTIONS
Bariatric Manual    You were provided a manual specific to the procedure that you have chosen.  Please refer to that with any questions or call the office at [phone]

## 2024-11-27 NOTE — H&P
Bariatric Consult:  Referred by Trena Ferro APRN    Lizette Connolly is here today for consult on Consult (Consult [facility])      History of Present Illness:     Lizette Connolly is a 44 y.o. female with morbid obesity with co-morbidities including sleep apnea, hypertension, GERD, and polycystic ovarian disease who presents for surgical consultation for the above procedure. Lizette has completed the initial intake visit and has been examined by our nurse practitioner, dietician, psychologist and underwent the extensive educational teaching process under the guidance of our bariatric coordinator and myself. Lizette also has seen or if has not yet will see the educational video MARIANNA on the surgical procedure if available. Lizette attended today more educational teaching from our bariatric coordinator and myself. Lizette has had an extensive medical workup including a visit with their primary care physician, EKG, chest radiograph, blood work, EGD or UGI and possibly further testing. These have been reviewed by me and discussed with the patient. Lizette is now ready to proceed with surgery. Lizette presently denies nausea, vomiting, fever, chills, chest pain, shortness of air, melena, hematochezia, hemetemesis, dysuria, frequency, hematuria.     Past Medical History:   Diagnosis Date    Anxiety and depression     GERD (gastroesophageal reflux disease)     Hip pain 01/07/2019    RIGHT    HPV in female     Hypertension     Kidney stones 01/07/2019    Lactose intolerance May 2020    After gadtric sleeve    Migraine     PCOS (polycystic ovarian syndrome)     Sleep apnea     GONE       Encounter Diagnoses   Name Primary?    Obesity, Class III, BMI 40-49.9 (morbid obesity) Yes    Essential hypertension     Gastroesophageal reflux disease, unspecified whether esophagitis present     Dysphagia, unspecified type     S/P laparoscopic sleeve gastrectomy     PCOS (polycystic ovarian syndrome)     Dietary counseling     Anxiety and  depression     Paraesophageal hernia        Past Surgical History:   Procedure Laterality Date    BARIATRIC SURGERY  May 2020    Gastric sleeve    COLONOSCOPY N/A 08/10/2022    Procedure: COLONOSCOPY TO CECUM AND TI wtih biopsies;  Surgeon: Cosmo Cordoba MD;  Location: [facility] ENDOSCOPY;  Service: Gastroenterology;  Laterality: N/A;  Pre: Abdominal pain, abnormal Ct  Post: diverticulosis, hemorrhoids    ENDOSCOPY  2010    ENDOSCOPY N/A 03/03/2020    Procedure: ESOPHAGOGASTRODUODENOSCOPY WITH BIOPSY;  Surgeon: Shady Betancourt Jr., MD;  Location: [facility] ENDOSCOPY;  Service: General;  Laterality: N/A;  PRE- GERD  POST- GASTRITIS    ENDOSCOPY N/A 04/27/2021    Procedure: ESOPHAGOGASTRODUODENOSCOPY WITH BALLOON DILATATION 18-20MM, BIOPSIES;  Surgeon: Shady Betancourt Jr., MD;  Location: [facility] ENDOSCOPY;  Service: General;  Laterality: N/A;  PRE-H/O SLEEVE, NAUSEA  POST- GASTRITIS    ENDOSCOPY N/A 11/16/2021    Procedure: ESOPHAGOGASTRODUODENOSCOPY WITH PYLORIC DILATATION, BIOPSIES;  Surgeon: Shady Betancourt Jr., MD;  Location: [facility] ENDOSCOPY;  Service: General;  Laterality: N/A;  PRE- GERD, H/O SLEEVE  POST- GASTRITIS    ENDOSCOPY N/A 5/16/2023    Procedure: ESOPHAGOGASTRODUODENOSCOPY WITH COLD BIOPSIES, BALLOON DILATATION 18-20MM;  Surgeon: Shady Betancourt Jr., MD;  Location: [facility] ENDOSCOPY;  Service: General;  Laterality: N/A;  PRE- DYSPHAGIA, H/O SLEEVE  POST- GASTRITIS    ENDOSCOPY N/A 5/7/2024    Procedure: ESOPHAGOGASTRODUODENOSCOPY WITH BALLOON DILATATION AND BIOPSY;  Surgeon: Shady Betancourt Jr., MD;  Location: [facility] ENDOSCOPY;  Service: General;  Laterality: N/A;  PRE- NAUSEA, H/O SLEEVE  POST- GASTRITIS    GASTRIC SLEEVE LAPAROSCOPIC N/A 05/20/2020    Procedure: GASTRIC SLEEVE LAPAROSCOPIC;  Surgeon: Shady Betancourt Jr., MD;  [place] [facility] [place] [facility];  Service: Bariatric;  Laterality: N/A;    LAPAROSCOPIC CHOLECYSTECTOMY  2010    MANDIBLE SURGERY      ORIF ANKLE FRACTURE Right      UPPER GASTROINTESTINAL ENDOSCOPY  2021         * No active hospital problems. *      Allergies   Allergen Reactions    Prochlorperazine Other (See Comments)     Severe agitation         Current Outpatient Medications:     albuterol sulfate  (90 Base) MCG/ACT inhaler, , Disp: , Rfl:     colestipol (COLESTID) 1 g tablet, TAKE 1 TABLET BY MOUTH DAILY, Disp: 30 tablet, Rfl: 2    FLUoxetine (PROzac) 20 MG capsule, Take 3 capsules by mouth Daily., Disp: , Rfl:     fluticasone (FLONASE) 50 MCG/ACT nasal spray, , Disp: , Rfl:     Fremanezumab-vfrm (Ajovy) 225 MG/1.5ML solution auto-injector, Inject 225 mg under the skin into the appropriate area as directed Every 30 (Thirty) Days., Disp: , Rfl:     gabapentin (NEURONTIN) 100 MG capsule, Take 1 capsule by mouth 3 (Three) Times a Day., Disp: , Rfl:     hydrOXYzine (ATARAX) 25 MG tablet, Take 1 tablet by mouth As Needed., Disp: , Rfl:     hyoscyamine (LEVSIN) 0.125 MG SL tablet, DISSOLVE 1 TABLET UNDER THE TONGUE EVERY 4 HOURS AS NEEDED FOR SPASMS, Disp: 30 tablet, Rfl: 0    levonorgestrel (MIRENA) 20 MCG/24HR IUD, 1 each by Intrauterine route 1 (One) Time., Disp: , Rfl:     linaclotide (Linzess) 145 MCG capsule capsule, Take 1 capsule by mouth Every Morning Before Breakfast., Disp: 30 capsule, Rfl: 11    losartan (COZAAR) 50 MG tablet, Take 1 tablet by mouth Daily., Disp: , Rfl:     Magnesium Oxide -Mg Supplement 400 (240 Mg) MG tablet, Take  by mouth Daily., Disp: , Rfl:     montelukast (SINGULAIR) 10 MG tablet, Take 1 tablet by mouth Daily., Disp: , Rfl:     Omega-3 Fatty Acids (FISH OIL) 1000 MG capsule capsule, Take 1 capsule by mouth Daily With Breakfast. HELD FOR ENDO X 2 WEEKS, Disp: , Rfl:     ondansetron (ZOFRAN) 8 MG tablet, TAKE 1 TABLET BY MOUTH EVERY 8 HOURS AS NEEDED FOR NAUSEA AND/OR VOMITING, Disp: 30 tablet, Rfl: 0    pantoprazole (PROTONIX) 20 MG EC tablet, TAKE 1 TABLET BY MOUTH 2 TIMES A DAY, Disp: 60 tablet, Rfl: 1    SUMAtriptan (IMITREX) 100  MG tablet, TAKE 1 TABLET BY MOUTH AT ONSET OF HEADACHE; MAY REPEAT 1 TABLET IN 2 HOURS IF NEEDED. MAX 2 TABLETS IN 24 HOURS, Disp: , Rfl:     SUMAtriptan Succinate (IMITREX) 6 MG/0.5ML injection, , Disp: , Rfl:     ubrogepant (UBRELVY) 100 MG tablet, Take 1 tablet by mouth As Needed., Disp: , Rfl:     Enoxaparin Sodium (LOVENOX) 40 MG/0.4ML solution prefilled syringe syringe, Inject 0.4 mL under the skin into the appropriate area as directed Every 12 (Twelve) Hours for 14 days. Start after surgery unless instructed otherwise, Disp: 11.2 mL, Rfl: 0    folic acid-vit B6-vit B12 (FOLBEE) 2.5-25-1 MG tablet tablet, Take 1 tablet by mouth Daily., Disp: 40 tablet, Rfl: 0    Social History     Socioeconomic History    Marital status:     Number of children: 3   Tobacco Use    Smoking status: Never    Smokeless tobacco: Never   Vaping Use    Vaping status: Never Used   Substance and Sexual Activity    Alcohol use: Not Currently     Comment: OCCASSIONALLY    Drug use: No    Sexual activity: Defer       Family History   Problem Relation Age of Onset    Breast cancer Maternal Grandmother     Hypertension Maternal Grandmother     Cancer Maternal Grandmother         BREAST & BONE    Obesity Mother     Diabetes Mother     Hypertension Mother     Heart attack Mother     Heart disease Mother     Sleep apnea Mother     Hypertension Father     Heart disease Brother     Hypertension Maternal Grandfather     Heart disease Maternal Grandfather     Cancer Maternal Grandfather     Hypertension Paternal Grandmother     Multiple sclerosis Paternal Grandmother     Hypertension Paternal Grandfather     Cancer Paternal Grandfather         LUNG    Malig Hyperthermia Neg Hx        Review of Systems:  Review of Systems   Constitutional:  Positive for fatigue.   Gastrointestinal:  Positive for nausea and vomiting.   Musculoskeletal:  Positive for arthralgias.   All other systems reviewed and are negative.      Physical Exam:  Vital  Signs:  Weight: 117 kg (259 lb)   Body mass index is 42.28 kg/m².  Temp: 97.3 °F (36.3 °C)   Heart Rate: 82   BP: 150/90     Physical Exam  Vitals reviewed.   HENT:      Head: Normocephalic and atraumatic.      Mouth/Throat:      Mouth: Mucous membranes are moist.      Pharynx: Oropharynx is clear.   Eyes:      General: No scleral icterus.     Extraocular Movements: Extraocular movements intact.      Conjunctiva/sclera: Conjunctivae normal.      Pupils: Pupils are equal, round, and reactive to light.   Neck:      Thyroid: No thyromegaly.   Cardiovascular:      Rate and Rhythm: Normal rate.   Pulmonary:      Effort: Pulmonary effort is normal. No respiratory distress.      Breath sounds: Normal breath sounds. No stridor. No wheezing or rhonchi.   Abdominal:      General: Bowel sounds are normal.      Palpations: Abdomen is soft.      Tenderness: There is no abdominal tenderness. There is no right CVA tenderness, left CVA tenderness, guarding or rebound.      Hernia: No hernia is present.   Musculoskeletal:         General: Normal range of motion.      Cervical back: Normal range of motion and neck supple.   Lymphadenopathy:      Cervical: No cervical adenopathy.   Skin:     General: Skin is warm and dry.      Findings: No erythema.   Neurological:      Mental Status: She is alert and oriented to person, place, and time.   Psychiatric:         Mood and Affect: Mood normal.         Behavior: Behavior normal.         Thought Content: Thought content normal.         Judgment: Judgment normal.         Assessment:    Lizette Connolly is a 44 y.o. year old female with medically complicated severe obesity with a BMI of Body mass index is 42.28 kg/m². and multiple co-morbidities listed in the encounter diagnosis.    I think she is an appropriate candidate for this surgery, and is ready to proceed.    Plan/Discussion/Summary:  No hiatal hernia per me but small sliding hiatal hernia per upper GI.  Patient does take PPI.   Patient with chronic nausea status post multiple dilatations.  Patient with dysphagia as well.  H. pylori negative.  Patient status post gastric sleeve without hiatal hernia repair by me May 2020.  Patient understands that they are at an increased risk for complications because of this being a revisional surgery/conversion  to another procedure.  The patient understands the increased risk of gastric injury/leak, bleeding, etc because of the scarring and previous surgery.  Also increased risk of other complications that are listed because of increased OR time.    The patient has returned to the office for a surgical consultation and has requested to proceed with the Shannan-en-Y  gastric bypass.  I have had the opportunity to obtain the history, examine the patient and review the patient's chart.  The procedure could be done laparoscopically and or robotically.    The patient understands that surgery is a tool and that weight loss is not guaranteed but only seen in the context of appropriate use, regular follow up, exercise and making appropriate food choices.      I have personally discussed the potential complications of the laparoscopic gastric bypass with this patient.  The patient is well aware of the potential complications of the surgery that include but not limited to bleeding, infections, deep venous thrombosis, pulmonary embolism, pulmonary complications such as pneumonia, cardiac events, hernias, small bowel obstruction, damage to the spleen or other organs, bowel injury, disfiguring scars, failure to lose weight, need for additional surgery, conversion to an open procedure and death.  I also discussed the risks that apply in particular to the gastric bypass such as the leaking of stomach and/or intestinal contents at the staple or suture line, the development of an intra-abdominal abscess,  strictures, ulcers, and vitamin/mineral deficiencies.  The patient was strongly advised to avoid smoking and the use of  non-steroidal anti-inflammatory drugs such as ibuprofen, Motrin and Advil in the postoperative period and understands the increased risk of ulcer formation, perforation, death if they did not stop the use of these medications/substances.     The risks, benefits, potential complications and alternative therapies were discussed at great lengths as outlined in our extensive consent forms, online consent, and educational teaching processes.      The patient has confirmed participation in the program's extensive educational activities.      All questions and concerns were answered to the patient's satisfaction.  The patient now wishes to proceed with surgery.    The patient agreed to a postoperative course of anticoagulant therapy.    I instructed patient that the surgery could be laparoscopically and/or robotically.    I instructed patient to start on a H2 blocker or proton pump inhibitor if not already on one of these medications.    I explained in detail the procedures that are in the consent.  All of these procedures have a chance to convert to open if any technical challenges or complications do occur.  Bariatric surgery is not cosmetic surgery but rather a tool to help a patient make a life-long commitment lifestyle change including diet, exercise, behavior changes, and taking supplemental vitamins and minerals.    Problems after surgery may require more operations to correct them.    The risks, benefits, alternatives, and potential complications of all of the procedures were explained in detail including, but not limited to death, anesthesia and medication adverse effect, deep venous thrombosis, pulmonary embolism, trocar site/incisional hernia, wound infection, abdominal infection, bleeding, failure to lose weight, gain weight, a change in body image, metabolic complications with vitamin deficiences and anemia.    Weight loss expectations were discussed with the patient in detail. The weight loss operations most  commonly performed are the sleeve gastrectomy and the Shannan-en-Y gastric bypass. These operations result in weight losses up to approximately 25-35% of initial body weight 12 to 24 months after surgery with the gastric bypass usually the higher percent of weight loss but depends on patient using the tool.    For the gastric bypass and loop duodenal switch (MARLEY-S) the risks include but not limited to the following early complications:  Anastomotic leak/peritonitis, Shannan/Alimentary/biliopancreatic limb obstruction, severe & minor wound infection/seroma, and nausea/vomiting.  Late complications can include but are not limited to malnutrition, vitamin deficiencies, frequent loose stools,  stomal stenosis, marginal ulcer, bowel obstruction, intussusception, internal, and incisional hernia.    Regarding the gastric sleeve, there is higher risk of dysphagia and reflux leading to possible Baptiste's esophagus compared to a gastric bypass, as well as risk of internal visceral/organ injury, splenectomy, bleeding, infection, leak (which could require further intervention possible conversion to Shannan-en-Y gastric bypass), stenosis and possibility of regaining weight.    Lizette was counseled regarding diagnostic results, instructions for management, risk factor reductions, prognosis, patient and family education, impressions, risks and benefits of treatment options and importance of compliance with treatment. Total time of the encounter was over 45 minutes counseling the patient regarding the procedure as above and reviewing as well as ordering labs, medications and the procedure.  The chart was also reviewed prior to seeing the patient reviewing previous testing, studies and labs.    Lizette understands the surgical procedures and the different surgical options that are available.  She understands the lifestyle changes that are required after surgery and has agreed to follow the guidelines outlined in the weight management  program.  She also expressed understanding of the risks involved and had all of female questions answered and desires to proceed.      Shady Betancourt MD  11/27/2024

## 2024-11-27 NOTE — H&P (VIEW-ONLY)
Bariatric Consult:  Referred by Trena Ferro APRN    Lizette Connolly is here today for consult on Consult (Consult [facility])      History of Present Illness:     Lizette Connolly is a 44 y.o. female with morbid obesity with co-morbidities including sleep apnea, hypertension, GERD, and polycystic ovarian disease who presents for surgical consultation for the above procedure. Lizette has completed the initial intake visit and has been examined by our nurse practitioner, dietician, psychologist and underwent the extensive educational teaching process under the guidance of our bariatric coordinator and myself. Lizette also has seen or if has not yet will see the educational video MARIANNA on the surgical procedure if available. Lizette attended today more educational teaching from our bariatric coordinator and myself. Lizette has had an extensive medical workup including a visit with their primary care physician, EKG, chest radiograph, blood work, EGD or UGI and possibly further testing. These have been reviewed by me and discussed with the patient. Lizette is now ready to proceed with surgery. Lizette presently denies nausea, vomiting, fever, chills, chest pain, shortness of air, melena, hematochezia, hemetemesis, dysuria, frequency, hematuria.     Past Medical History:   Diagnosis Date    Anxiety and depression     GERD (gastroesophageal reflux disease)     Hip pain 01/07/2019    RIGHT    HPV in female     Hypertension     Kidney stones 01/07/2019    Lactose intolerance May 2020    After gadtric sleeve    Migraine     PCOS (polycystic ovarian syndrome)     Sleep apnea     GONE       Encounter Diagnoses   Name Primary?    Obesity, Class III, BMI 40-49.9 (morbid obesity) Yes    Essential hypertension     Gastroesophageal reflux disease, unspecified whether esophagitis present     Dysphagia, unspecified type     S/P laparoscopic sleeve gastrectomy     PCOS (polycystic ovarian syndrome)     Dietary counseling     Anxiety and  depression     Paraesophageal hernia        Past Surgical History:   Procedure Laterality Date    BARIATRIC SURGERY  May 2020    Gastric sleeve    COLONOSCOPY N/A 08/10/2022    Procedure: COLONOSCOPY TO CECUM AND TI wtih biopsies;  Surgeon: Cosmo Cordoba MD;  Location: [facility] ENDOSCOPY;  Service: Gastroenterology;  Laterality: N/A;  Pre: Abdominal pain, abnormal Ct  Post: diverticulosis, hemorrhoids    ENDOSCOPY  2010    ENDOSCOPY N/A 03/03/2020    Procedure: ESOPHAGOGASTRODUODENOSCOPY WITH BIOPSY;  Surgeon: Shady Betancourt Jr., MD;  Location: [facility] ENDOSCOPY;  Service: General;  Laterality: N/A;  PRE- GERD  POST- GASTRITIS    ENDOSCOPY N/A 04/27/2021    Procedure: ESOPHAGOGASTRODUODENOSCOPY WITH BALLOON DILATATION 18-20MM, BIOPSIES;  Surgeon: Shady Betancourt Jr., MD;  Location: [facility] ENDOSCOPY;  Service: General;  Laterality: N/A;  PRE-H/O SLEEVE, NAUSEA  POST- GASTRITIS    ENDOSCOPY N/A 11/16/2021    Procedure: ESOPHAGOGASTRODUODENOSCOPY WITH PYLORIC DILATATION, BIOPSIES;  Surgeon: Shady Betancourt Jr., MD;  Location: [facility] ENDOSCOPY;  Service: General;  Laterality: N/A;  PRE- GERD, H/O SLEEVE  POST- GASTRITIS    ENDOSCOPY N/A 5/16/2023    Procedure: ESOPHAGOGASTRODUODENOSCOPY WITH COLD BIOPSIES, BALLOON DILATATION 18-20MM;  Surgeon: Shady Betancourt Jr., MD;  Location: [facility] ENDOSCOPY;  Service: General;  Laterality: N/A;  PRE- DYSPHAGIA, H/O SLEEVE  POST- GASTRITIS    ENDOSCOPY N/A 5/7/2024    Procedure: ESOPHAGOGASTRODUODENOSCOPY WITH BALLOON DILATATION AND BIOPSY;  Surgeon: Shady Betancourt Jr., MD;  Location: [facility] ENDOSCOPY;  Service: General;  Laterality: N/A;  PRE- NAUSEA, H/O SLEEVE  POST- GASTRITIS    GASTRIC SLEEVE LAPAROSCOPIC N/A 05/20/2020    Procedure: GASTRIC SLEEVE LAPAROSCOPIC;  Surgeon: Shady Betancourt Jr., MD;  Location: [facility] [place] [facility];  Service: Bariatric;  Laterality: N/A;    LAPAROSCOPIC CHOLECYSTECTOMY  2010    MANDIBLE SURGERY      ORIF ANKLE FRACTURE Right      UPPER GASTROINTESTINAL ENDOSCOPY  2021         * No active hospital problems. *      Allergies   Allergen Reactions    Prochlorperazine Other (See Comments)     Severe agitation         Current Outpatient Medications:     albuterol sulfate  (90 Base) MCG/ACT inhaler, , Disp: , Rfl:     colestipol (COLESTID) 1 g tablet, TAKE 1 TABLET BY MOUTH DAILY, Disp: 30 tablet, Rfl: 2    FLUoxetine (PROzac) 20 MG capsule, Take 3 capsules by mouth Daily., Disp: , Rfl:     fluticasone (FLONASE) 50 MCG/ACT nasal spray, , Disp: , Rfl:     Fremanezumab-vfrm (Ajovy) 225 MG/1.5ML solution auto-injector, Inject 225 mg under the skin into the appropriate area as directed Every 30 (Thirty) Days., Disp: , Rfl:     gabapentin (NEURONTIN) 100 MG capsule, Take 1 capsule by mouth 3 (Three) Times a Day., Disp: , Rfl:     hydrOXYzine (ATARAX) 25 MG tablet, Take 1 tablet by mouth As Needed., Disp: , Rfl:     hyoscyamine (LEVSIN) 0.125 MG SL tablet, DISSOLVE 1 TABLET UNDER THE TONGUE EVERY 4 HOURS AS NEEDED FOR SPASMS, Disp: 30 tablet, Rfl: 0    levonorgestrel (MIRENA) 20 MCG/24HR IUD, 1 each by Intrauterine route 1 (One) Time., Disp: , Rfl:     linaclotide (Linzess) 145 MCG capsule capsule, Take 1 capsule by mouth Every Morning Before Breakfast., Disp: 30 capsule, Rfl: 11    losartan (COZAAR) 50 MG tablet, Take 1 tablet by mouth Daily., Disp: , Rfl:     Magnesium Oxide -Mg Supplement 400 (240 Mg) MG tablet, Take  by mouth Daily., Disp: , Rfl:     montelukast (SINGULAIR) 10 MG tablet, Take 1 tablet by mouth Daily., Disp: , Rfl:     Omega-3 Fatty Acids (FISH OIL) 1000 MG capsule capsule, Take 1 capsule by mouth Daily With Breakfast. HELD FOR ENDO X 2 WEEKS, Disp: , Rfl:     ondansetron (ZOFRAN) 8 MG tablet, TAKE 1 TABLET BY MOUTH EVERY 8 HOURS AS NEEDED FOR NAUSEA AND/OR VOMITING, Disp: 30 tablet, Rfl: 0    pantoprazole (PROTONIX) 20 MG EC tablet, TAKE 1 TABLET BY MOUTH 2 TIMES A DAY, Disp: 60 tablet, Rfl: 1    SUMAtriptan (IMITREX) 100  MG tablet, TAKE 1 TABLET BY MOUTH AT ONSET OF HEADACHE; MAY REPEAT 1 TABLET IN 2 HOURS IF NEEDED. MAX 2 TABLETS IN 24 HOURS, Disp: , Rfl:     SUMAtriptan Succinate (IMITREX) 6 MG/0.5ML injection, , Disp: , Rfl:     ubrogepant (UBRELVY) 100 MG tablet, Take 1 tablet by mouth As Needed., Disp: , Rfl:     Enoxaparin Sodium (LOVENOX) 40 MG/0.4ML solution prefilled syringe syringe, Inject 0.4 mL under the skin into the appropriate area as directed Every 12 (Twelve) Hours for 14 days. Start after surgery unless instructed otherwise, Disp: 11.2 mL, Rfl: 0    folic acid-vit B6-vit B12 (FOLBEE) 2.5-25-1 MG tablet tablet, Take 1 tablet by mouth Daily., Disp: 40 tablet, Rfl: 0    Social History     Socioeconomic History    Marital status:     Number of children: 3   Tobacco Use    Smoking status: Never    Smokeless tobacco: Never   Vaping Use    Vaping status: Never Used   Substance and Sexual Activity    Alcohol use: Not Currently     Comment: OCCASSIONALLY    Drug use: No    Sexual activity: Defer       Family History   Problem Relation Age of Onset    Breast cancer Maternal Grandmother     Hypertension Maternal Grandmother     Cancer Maternal Grandmother         BREAST & BONE    Obesity Mother     Diabetes Mother     Hypertension Mother     Heart attack Mother     Heart disease Mother     Sleep apnea Mother     Hypertension Father     Heart disease Brother     Hypertension Maternal Grandfather     Heart disease Maternal Grandfather     Cancer Maternal Grandfather     Hypertension Paternal Grandmother     Multiple sclerosis Paternal Grandmother     Hypertension Paternal Grandfather     Cancer Paternal Grandfather         LUNG    Malig Hyperthermia Neg Hx        Review of Systems:  Review of Systems   Constitutional:  Positive for fatigue.   Gastrointestinal:  Positive for nausea and vomiting.   Musculoskeletal:  Positive for arthralgias.   All other systems reviewed and are negative.      Physical Exam:  Vital  Signs:  Weight: 117 kg (259 lb)   Body mass index is 42.28 kg/m².  Temp: 97.3 °F (36.3 °C)   Heart Rate: 82   BP: 150/90     Physical Exam  Vitals reviewed.   HENT:      Head: Normocephalic and atraumatic.      Mouth/Throat:      Mouth: Mucous membranes are moist.      Pharynx: Oropharynx is clear.   Eyes:      General: No scleral icterus.     Extraocular Movements: Extraocular movements intact.      Conjunctiva/sclera: Conjunctivae normal.      Pupils: Pupils are equal, round, and reactive to light.   Neck:      Thyroid: No thyromegaly.   Cardiovascular:      Rate and Rhythm: Normal rate.   Pulmonary:      Effort: Pulmonary effort is normal. No respiratory distress.      Breath sounds: Normal breath sounds. No stridor. No wheezing or rhonchi.   Abdominal:      General: Bowel sounds are normal.      Palpations: Abdomen is soft.      Tenderness: There is no abdominal tenderness. There is no right CVA tenderness, left CVA tenderness, guarding or rebound.      Hernia: No hernia is present.   Musculoskeletal:         General: Normal range of motion.      Cervical back: Normal range of motion and neck supple.   Lymphadenopathy:      Cervical: No cervical adenopathy.   Skin:     General: Skin is warm and dry.      Findings: No erythema.   Neurological:      Mental Status: She is alert and oriented to person, place, and time.   Psychiatric:         Mood and Affect: Mood normal.         Behavior: Behavior normal.         Thought Content: Thought content normal.         Judgment: Judgment normal.         Assessment:    Lizette Connolly is a 44 y.o. year old female with medically complicated severe obesity with a BMI of Body mass index is 42.28 kg/m². and multiple co-morbidities listed in the encounter diagnosis.    I think she is an appropriate candidate for this surgery, and is ready to proceed.    Plan/Discussion/Summary:  No hiatal hernia per me but small sliding hiatal hernia per upper GI.  Patient does take PPI.   Patient with chronic nausea status post multiple dilatations.  Patient with dysphagia as well.  H. pylori negative.  Patient status post gastric sleeve without hiatal hernia repair by me May 2020.  Patient understands that they are at an increased risk for complications because of this being a revisional surgery/conversion  to another procedure.  The patient understands the increased risk of gastric injury/leak, bleeding, etc because of the scarring and previous surgery.  Also increased risk of other complications that are listed because of increased OR time.    The patient has returned to the office for a surgical consultation and has requested to proceed with the Shannan-en-Y  gastric bypass.  I have had the opportunity to obtain the history, examine the patient and review the patient's chart.  The procedure could be done laparoscopically and or robotically.    The patient understands that surgery is a tool and that weight loss is not guaranteed but only seen in the context of appropriate use, regular follow up, exercise and making appropriate food choices.      I have personally discussed the potential complications of the laparoscopic gastric bypass with this patient.  The patient is well aware of the potential complications of the surgery that include but not limited to bleeding, infections, deep venous thrombosis, pulmonary embolism, pulmonary complications such as pneumonia, cardiac events, hernias, small bowel obstruction, damage to the spleen or other organs, bowel injury, disfiguring scars, failure to lose weight, need for additional surgery, conversion to an open procedure and death.  I also discussed the risks that apply in particular to the gastric bypass such as the leaking of stomach and/or intestinal contents at the staple or suture line, the development of an intra-abdominal abscess,  strictures, ulcers, and vitamin/mineral deficiencies.  The patient was strongly advised to avoid smoking and the use of  non-steroidal anti-inflammatory drugs such as ibuprofen, Motrin and Advil in the postoperative period and understands the increased risk of ulcer formation, perforation, death if they did not stop the use of these medications/substances.     The risks, benefits, potential complications and alternative therapies were discussed at great lengths as outlined in our extensive consent forms, online consent, and educational teaching processes.      The patient has confirmed participation in the program's extensive educational activities.      All questions and concerns were answered to the patient's satisfaction.  The patient now wishes to proceed with surgery.    The patient agreed to a postoperative course of anticoagulant therapy.    I instructed patient that the surgery could be laparoscopically and/or robotically.    I instructed patient to start on a H2 blocker or proton pump inhibitor if not already on one of these medications.    I explained in detail the procedures that are in the consent.  All of these procedures have a chance to convert to open if any technical challenges or complications do occur.  Bariatric surgery is not cosmetic surgery but rather a tool to help a patient make a life-long commitment lifestyle change including diet, exercise, behavior changes, and taking supplemental vitamins and minerals.    Problems after surgery may require more operations to correct them.    The risks, benefits, alternatives, and potential complications of all of the procedures were explained in detail including, but not limited to death, anesthesia and medication adverse effect, deep venous thrombosis, pulmonary embolism, trocar site/incisional hernia, wound infection, abdominal infection, bleeding, failure to lose weight, gain weight, a change in body image, metabolic complications with vitamin deficiences and anemia.    Weight loss expectations were discussed with the patient in detail. The weight loss operations most  commonly performed are the sleeve gastrectomy and the Shannan-en-Y gastric bypass. These operations result in weight losses up to approximately 25-35% of initial body weight 12 to 24 months after surgery with the gastric bypass usually the higher percent of weight loss but depends on patient using the tool.    For the gastric bypass and loop duodenal switch (MARLEY-S) the risks include but not limited to the following early complications:  Anastomotic leak/peritonitis, Shannan/Alimentary/biliopancreatic limb obstruction, severe & minor wound infection/seroma, and nausea/vomiting.  Late complications can include but are not limited to malnutrition, vitamin deficiencies, frequent loose stools,  stomal stenosis, marginal ulcer, bowel obstruction, intussusception, internal, and incisional hernia.    Regarding the gastric sleeve, there is higher risk of dysphagia and reflux leading to possible Baptiste's esophagus compared to a gastric bypass, as well as risk of internal visceral/organ injury, splenectomy, bleeding, infection, leak (which could require further intervention possible conversion to Shannan-en-Y gastric bypass), stenosis and possibility of regaining weight.    Lizette was counseled regarding diagnostic results, instructions for management, risk factor reductions, prognosis, patient and family education, impressions, risks and benefits of treatment options and importance of compliance with treatment. Total time of the encounter was over 45 minutes counseling the patient regarding the procedure as above and reviewing as well as ordering labs, medications and the procedure.  The chart was also reviewed prior to seeing the patient reviewing previous testing, studies and labs.    Lizette understands the surgical procedures and the different surgical options that are available.  She understands the lifestyle changes that are required after surgery and has agreed to follow the guidelines outlined in the weight management  program.  She also expressed understanding of the risks involved and had all of female questions answered and desires to proceed.      Shady Betancourt MD  11/27/2024

## 2024-12-06 ENCOUNTER — PRE-ADMISSION TESTING (OUTPATIENT)
Dept: PREADMISSION TESTING | Facility: HOSPITAL | Age: 44
End: 2024-12-06
Payer: COMMERCIAL

## 2024-12-06 VITALS
OXYGEN SATURATION: 100 % | BODY MASS INDEX: 42.28 KG/M2 | SYSTOLIC BLOOD PRESSURE: 129 MMHG | HEART RATE: 79 BPM | RESPIRATION RATE: 16 BRPM | DIASTOLIC BLOOD PRESSURE: 75 MMHG | HEIGHT: 66 IN | TEMPERATURE: 96.7 F

## 2024-12-06 LAB
ALBUMIN SERPL-MCNC: 3.9 G/DL (ref 3.5–5.2)
ALBUMIN/GLOB SERPL: 1.3 G/DL
ALP SERPL-CCNC: 95 U/L (ref 39–117)
ALT SERPL W P-5'-P-CCNC: 20 U/L (ref 1–33)
ANION GAP SERPL CALCULATED.3IONS-SCNC: 8 MMOL/L (ref 5–15)
AST SERPL-CCNC: 20 U/L (ref 1–32)
BILIRUB SERPL-MCNC: 0.3 MG/DL (ref 0–1.2)
BUN SERPL-MCNC: 28 MG/DL (ref 6–20)
BUN/CREAT SERPL: 31.5 (ref 7–25)
CALCIUM SPEC-SCNC: 9.3 MG/DL (ref 8.6–10.5)
CHLORIDE SERPL-SCNC: 101 MMOL/L (ref 98–107)
CO2 SERPL-SCNC: 28 MMOL/L (ref 22–29)
CREAT SERPL-MCNC: 0.89 MG/DL (ref 0.57–1)
DEPRECATED RDW RBC AUTO: 40.5 FL (ref 37–54)
EGFRCR SERPLBLD CKD-EPI 2021: 82.6 ML/MIN/1.73
ERYTHROCYTE [DISTWIDTH] IN BLOOD BY AUTOMATED COUNT: 11.8 % (ref 12.3–15.4)
GLOBULIN UR ELPH-MCNC: 2.9 GM/DL
GLUCOSE SERPL-MCNC: 85 MG/DL (ref 65–99)
HCG SERPL QL: NEGATIVE
HCT VFR BLD AUTO: 44.2 % (ref 34–46.6)
HGB BLD-MCNC: 14.5 G/DL (ref 12–15.9)
MCH RBC QN AUTO: 30.7 PG (ref 26.6–33)
MCHC RBC AUTO-ENTMCNC: 32.8 G/DL (ref 31.5–35.7)
MCV RBC AUTO: 93.4 FL (ref 79–97)
PLATELET # BLD AUTO: 330 10*3/MM3 (ref 140–450)
PMV BLD AUTO: 10.2 FL (ref 6–12)
POTASSIUM SERPL-SCNC: 4.5 MMOL/L (ref 3.5–5.2)
PROT SERPL-MCNC: 6.8 G/DL (ref 6–8.5)
RBC # BLD AUTO: 4.73 10*6/MM3 (ref 3.77–5.28)
SODIUM SERPL-SCNC: 137 MMOL/L (ref 136–145)
WBC NRBC COR # BLD AUTO: 8.24 10*3/MM3 (ref 3.4–10.8)

## 2024-12-06 PROCEDURE — 80053 COMPREHEN METABOLIC PANEL: CPT | Performed by: SURGERY

## 2024-12-06 PROCEDURE — 85027 COMPLETE CBC AUTOMATED: CPT | Performed by: SURGERY

## 2024-12-06 PROCEDURE — 84703 CHORIONIC GONADOTROPIN ASSAY: CPT | Performed by: SURGERY

## 2024-12-06 NOTE — DISCHARGE INSTRUCTIONS
Take only the following medications the morning of surgery: NONE    HOLD LOSARTAN 24 HOURS PRIOR TO SURGERY      Do not take Bariatric Vitamins, Folic Acid, Actigall (if applicable) or Lovenox Injections (if applicable) the morning of surgery.  If you have a history of blood clots or have a BMI greater than 50, Dr. Betancourt may order Lovenox for after surgery. Do not take Lovenox blood thinner before surgery.      General Instructions:    Liquids only the day before surgery.  Drink one 20 ounce Gatorade G2 the evening before surgery.  Nothing red in color.   The morning of surgery have another 20 ounce Gatorade G2.  Again, nothing red in color.  Your drink must be completed 2 hours before your arrival time.   Patients who avoid smoking, chewing tobacco and alcohol for 4 weeks prior to surgery have a reduced risk of post-operative complications.  Quit smoking as many days before surgery as you can.  Do not smoke, use chewing tobacco or drink alcohol the day of surgery.   Bring any papers given to you in the doctor's office.  Wear clean comfortable clothes.  Do not wear contact lenses, false eyelashes or make-up.  Bring a case for your glasses.   Bring crutches or walker if applicable.  Remove all piercings.  Leave jewelry and any other valuables at home.  Remove fingernail polish, gel overlays or any artificial nails.  Hair extensions with metal clips must be removed prior to surgery.  The Pre-Admission Testing nurse will instruct you to bring medications if unable to obtain an accurate list in Pre-Admission Testing.    If you were given a blood bank ID arm band remember to bring it with you the day of surgery.    Preventing a Surgical Site Infection:  For 2 to 3 days before surgery, avoid shaving with a razor because the razor can irritate skin and make it easier to develop an infection.    Any areas of open skin can increase the risk of a post-operative wound infection by allowing bacteria to enter and travel  throughout the body.  Notify your surgeon if you have any skin wounds / rashes even if it is not near the expected surgical site.  The area will need assessed to determine if surgery should be delayed until it is healed.  2 days prior to surgery, take a shower using a fresh bar of anti-bacterial soap (such as Dial).  Use a clean washcloth and dry with a clean towel.    The day prior to surgery, take a shower using a fresh bar of anti-bacterial soap (such as Dial).  Use a clean washcloth and dry with a clean towel.  Sleep in a clean bed with clean clothing.  Do not allow pets to sleep with you.  The morning of surgery shower using a fresh bar of anti-bacterial soap (such as Dial).  Use a clean washcloth and dry with a clean towel.  Follow the Chlorhexidine instructions below.    CHLORHEXIDINE CLOTH INSTRUCTIONS  The morning of surgery follow these instructions using the Chlorhexidine cloths you've been given.  These steps reduce bacteria on the body.  Do not use the cloths near your eyes, ears mouth, genitalia or on open wounds.  Throw the cloths away after use but do not try to flush them down a toilet.    Open and remove one cloth at a time from the package.    Leave the cloth unfolded and begin the bathing.  Massage the skin with the cloths using gentle pressure to remove bacteria.  Do not scrub harshly.   Follow the steps below with one 2% CHG cloth per area (6 total cloths).  One cloth for neck, shoulders and chest.  One cloth for both arms, hands, fingers and underarms (do underarms last).  One cloth for the abdomen followed by groin.  One cloth for right leg and foot including between the toes.  One cloth for left leg and foot including between the toes.  The last cloth is to be used for the back of the neck, back and buttocks.    Allow the CHG to air dry 3 minutes on the skin which will give it time to work and decrease the chance of irritation.  The skin may feel sticky until it is dry.  Do not rinse with  water or any other liquid or you will lose the beneficial effects of the CHG.  If mild skin irritation occurs, do rinse the skin to remove the CHG.  Report this to the nurse at time of admission.  Do not apply lotions, creams, ointments, deodorants or perfumes after using the cloths. Dress in clean clothes before coming to the hospital.    Ask your surgeon if you will be receiving antibiotics prior to surgery.  Make sure you, your family, and all healthcare providers clean their hands with soap and water or an alcohol based hand  before caring for you or your wound.      Day of surgery:  Your arrival time is approximately two hours before your scheduled surgery time.  Please note if you have an early arrival time the surgery doors do not open before 5:00 AM.  Upon arrival, a Pre-op nurse and Anesthesiologist will review your health history, obtain vital signs, and answer questions you may have.  A Pre-op nurse will start an IV and you may receive medication in preparation for surgery, including something to help you relax.  If applicable, we do ask that you have your C-PAP/BI-PAP machine available. It can be utilized the night of surgery.     Please be aware that surgery does come with discomfort.  We want to make every effort to control your discomfort so please discuss any uncontrolled symptoms with your nurse.   Your doctor will most likely have prescribed pain medications.      If you are going home after surgery you will receive individualized written care instructions before being discharged.  A responsible adult must drive you to and from the hospital on the day of your surgery and ideally stay with you through the night.   Discharge prescriptions can be filled by the hospital pharmacy during regular pharmacy hours.  If you are having surgery late in the day/evening your prescription may be e-prescribed to your pharmacy.  Please verify your pharmacy hours or chose a 24 hour pharmacy to avoid not  having access to your prescription because your pharmacy has closed for the day.    If you are staying overnight following surgery, you will be transported to your hospital room following the recovery period.  [facility] has all private rooms.    If you have any questions please call Pre-Admission Testing at [phone].  Deductibles and co-payments are collected on the day of service. Please be prepared to pay the required co-pay, deductible or deposit on the day of service as defined by your plan.    Call your surgeon immediately if you experience any of the following symptoms:  Sore Throat  Shortness of Breath or difficulty breathing  Cough  Chills  Body soreness or muscle pain  Headache  Fever  New loss of taste or smell  Do not arrive for your surgery ill.  Your procedure will need to be rescheduled to another time.  You will need to call your physician before the day of surgery to avoid any unnecessary exposure to hospital staff as well as other patients.

## 2024-12-09 RX ORDER — ONDANSETRON 8 MG/1
TABLET, FILM COATED ORAL
Qty: 30 TABLET | Refills: 0 | Status: SHIPPED | OUTPATIENT
Start: 2024-12-09 | End: 2024-12-12 | Stop reason: HOSPADM

## 2024-12-11 ENCOUNTER — ANESTHESIA EVENT (OUTPATIENT)
Dept: PERIOP | Facility: HOSPITAL | Age: 44
End: 2024-12-11
Payer: COMMERCIAL

## 2024-12-11 ENCOUNTER — HOSPITAL ENCOUNTER (OUTPATIENT)
Facility: HOSPITAL | Age: 44
LOS: 1 days | Discharge: HOME | End: 2024-12-12
Attending: SURGERY | Admitting: SURGERY
Payer: COMMERCIAL

## 2024-12-11 ENCOUNTER — ANESTHESIA (OUTPATIENT)
Dept: PERIOP | Facility: HOSPITAL | Age: 44
End: 2024-12-11
Payer: COMMERCIAL

## 2024-12-11 DIAGNOSIS — E66.01 OBESITY, CLASS III, BMI 40-49.9 (MORBID OBESITY): ICD-10-CM

## 2024-12-11 PROCEDURE — 25810000003 SODIUM CHLORIDE PER 500 ML: Performed by: SURGERY

## 2024-12-11 PROCEDURE — 25010000002 DEXAMETHASONE SODIUM PHOSPHATE 20 MG/5ML SOLUTION: Performed by: NURSE ANESTHETIST, CERTIFIED REGISTERED

## 2024-12-11 PROCEDURE — 25010000002 SUGAMMADEX 200 MG/2ML SOLUTION: Performed by: NURSE ANESTHETIST, CERTIFIED REGISTERED

## 2024-12-11 PROCEDURE — 25010000002 LABETALOL 5 MG/ML SOLUTION: Performed by: SURGERY

## 2024-12-11 PROCEDURE — 25010000002 FENTANYL CITRATE (PF) 50 MCG/ML SOLUTION: Performed by: NURSE ANESTHETIST, CERTIFIED REGISTERED

## 2024-12-11 PROCEDURE — 25010000002 ROPIVACAINE PER 1 MG: Performed by: SURGERY

## 2024-12-11 PROCEDURE — 25010000002 MAGNESIUM SULFATE PER 500 MG OF MAGNESIUM: Performed by: NURSE ANESTHETIST, CERTIFIED REGISTERED

## 2024-12-11 PROCEDURE — 25810000003 LACTATED RINGERS PER 1000 ML: Performed by: SURGERY

## 2024-12-11 PROCEDURE — 25010000002 HYDROMORPHONE PER 4 MG: Performed by: NURSE ANESTHETIST, CERTIFIED REGISTERED

## 2024-12-11 PROCEDURE — 43644 LAP GASTRIC BYPASS/ROUX-EN-Y: CPT | Performed by: SURGERY

## 2024-12-11 PROCEDURE — 43644 LAP GASTRIC BYPASS/ROUX-EN-Y: CPT | Performed by: NURSE PRACTITIONER

## 2024-12-11 PROCEDURE — 25010000002 LIDOCAINE PF 2% 2 % SOLUTION: Performed by: NURSE ANESTHETIST, CERTIFIED REGISTERED

## 2024-12-11 PROCEDURE — 25010000002 EPINEPHRINE 1 MG/ML SOLUTION 30 ML VIAL: Performed by: SURGERY

## 2024-12-11 PROCEDURE — 25010000002 HYDROMORPHONE PER 4 MG: Performed by: SURGERY

## 2024-12-11 PROCEDURE — 25810000003 LACTATED RINGERS PER 1000 ML: Performed by: ANESTHESIOLOGY

## 2024-12-11 PROCEDURE — 25010000002 KETOROLAC TROMETHAMINE PER 15 MG: Performed by: SURGERY

## 2024-12-11 PROCEDURE — 25010000002 ACETAMINOPHEN 10 MG/ML SOLUTION: Performed by: NURSE ANESTHETIST, CERTIFIED REGISTERED

## 2024-12-11 PROCEDURE — 25810000003 LACTATED RINGERS SOLUTION: Performed by: SURGERY

## 2024-12-11 PROCEDURE — 25010000002 CLONIDINE PER 1 MG: Performed by: SURGERY

## 2024-12-11 PROCEDURE — 25010000002 THIAMINE PER 100 MG: Performed by: SURGERY

## 2024-12-11 PROCEDURE — 25010000002 PROPOFOL 10 MG/ML EMULSION: Performed by: NURSE ANESTHETIST, CERTIFIED REGISTERED

## 2024-12-11 PROCEDURE — 25010000002 ESMOLOL 100 MG/10ML SOLUTION: Performed by: NURSE ANESTHETIST, CERTIFIED REGISTERED

## 2024-12-11 PROCEDURE — 25010000002 METOCLOPRAMIDE PER 10 MG: Performed by: SURGERY

## 2024-12-11 PROCEDURE — 25010000002 ONDANSETRON PER 1 MG: Performed by: NURSE ANESTHETIST, CERTIFIED REGISTERED

## 2024-12-11 PROCEDURE — 25010000002 CEFAZOLIN 3 G RECONSTITUTED SOLUTION 1 EACH VIAL: Performed by: SURGERY

## 2024-12-11 DEVICE — STAPLER 60 RELOAD WHITE
Type: IMPLANTABLE DEVICE | Site: ABDOMEN | Status: FUNCTIONAL
Brand: SUREFORM

## 2024-12-11 RX ORDER — OXYCODONE AND ACETAMINOPHEN 7.5; 325 MG/1; MG/1
1 TABLET ORAL EVERY 4 HOURS PRN
Status: DISCONTINUED | OUTPATIENT
Start: 2024-12-11 | End: 2024-12-11 | Stop reason: HOSPADM

## 2024-12-11 RX ORDER — LOSARTAN POTASSIUM 100 MG/1
100 TABLET ORAL DAILY
Status: DISCONTINUED | OUTPATIENT
Start: 2024-12-11 | End: 2024-12-12 | Stop reason: HOSPADM

## 2024-12-11 RX ORDER — EPHEDRINE SULFATE 50 MG/ML
5 INJECTION, SOLUTION INTRAVENOUS ONCE AS NEEDED
Status: DISCONTINUED | OUTPATIENT
Start: 2024-12-11 | End: 2024-12-11 | Stop reason: HOSPADM

## 2024-12-11 RX ORDER — FENTANYL CITRATE 50 UG/ML
50 INJECTION, SOLUTION INTRAMUSCULAR; INTRAVENOUS ONCE AS NEEDED
Status: DISCONTINUED | OUTPATIENT
Start: 2024-12-11 | End: 2024-12-11 | Stop reason: HOSPADM

## 2024-12-11 RX ORDER — HYDROCODONE BITARTRATE AND ACETAMINOPHEN 5; 325 MG/1; MG/1
1 TABLET ORAL ONCE AS NEEDED
Status: DISCONTINUED | OUTPATIENT
Start: 2024-12-11 | End: 2024-12-11 | Stop reason: HOSPADM

## 2024-12-11 RX ORDER — SODIUM CHLORIDE 0.9 % (FLUSH) 0.9 %
3-10 SYRINGE (ML) INJECTION AS NEEDED
Status: DISCONTINUED | OUTPATIENT
Start: 2024-12-11 | End: 2024-12-11 | Stop reason: HOSPADM

## 2024-12-11 RX ORDER — CHLORHEXIDINE GLUCONATE ORAL RINSE 1.2 MG/ML
15 SOLUTION DENTAL SEE ADMIN INSTRUCTIONS
Status: COMPLETED | OUTPATIENT
Start: 2024-12-11 | End: 2024-12-11

## 2024-12-11 RX ORDER — MIRTAZAPINE 15 MG/1
15 TABLET, ORALLY DISINTEGRATING ORAL NIGHTLY PRN
Status: DISCONTINUED | OUTPATIENT
Start: 2024-12-11 | End: 2024-12-12 | Stop reason: HOSPADM

## 2024-12-11 RX ORDER — LABETALOL 200 MG/1
200 TABLET, FILM COATED ORAL
Status: DISCONTINUED | OUTPATIENT
Start: 2024-12-11 | End: 2024-12-12 | Stop reason: HOSPADM

## 2024-12-11 RX ORDER — DROPERIDOL 2.5 MG/ML
1.25 INJECTION, SOLUTION INTRAMUSCULAR; INTRAVENOUS
Status: DISCONTINUED | OUTPATIENT
Start: 2024-12-11 | End: 2024-12-12 | Stop reason: HOSPADM

## 2024-12-11 RX ORDER — GABAPENTIN 300 MG/1
300 CAPSULE ORAL EVERY 8 HOURS PRN
Status: DISCONTINUED | OUTPATIENT
Start: 2024-12-11 | End: 2024-12-12 | Stop reason: HOSPADM

## 2024-12-11 RX ORDER — NALOXONE HCL 0.4 MG/ML
0.2 VIAL (ML) INJECTION AS NEEDED
Status: DISCONTINUED | OUTPATIENT
Start: 2024-12-11 | End: 2024-12-11 | Stop reason: HOSPADM

## 2024-12-11 RX ORDER — DROPERIDOL 2.5 MG/ML
0.62 INJECTION, SOLUTION INTRAMUSCULAR; INTRAVENOUS
Status: DISCONTINUED | OUTPATIENT
Start: 2024-12-11 | End: 2024-12-11 | Stop reason: HOSPADM

## 2024-12-11 RX ORDER — LABETALOL HYDROCHLORIDE 5 MG/ML
10 INJECTION, SOLUTION INTRAVENOUS
Status: DISCONTINUED | OUTPATIENT
Start: 2024-12-11 | End: 2024-12-12 | Stop reason: HOSPADM

## 2024-12-11 RX ORDER — MAGNESIUM SULFATE HEPTAHYDRATE 500 MG/ML
INJECTION, SOLUTION INTRAMUSCULAR; INTRAVENOUS AS NEEDED
Status: DISCONTINUED | OUTPATIENT
Start: 2024-12-11 | End: 2024-12-11 | Stop reason: SURG

## 2024-12-11 RX ORDER — ONDANSETRON 2 MG/ML
INJECTION INTRAMUSCULAR; INTRAVENOUS AS NEEDED
Status: DISCONTINUED | OUTPATIENT
Start: 2024-12-11 | End: 2024-12-11 | Stop reason: SURG

## 2024-12-11 RX ORDER — THIAMINE HYDROCHLORIDE 100 MG/ML
200 INJECTION, SOLUTION INTRAMUSCULAR; INTRAVENOUS ONCE
Status: COMPLETED | OUTPATIENT
Start: 2024-12-12 | End: 2024-12-11

## 2024-12-11 RX ORDER — SODIUM CHLORIDE 0.9 % (FLUSH) 0.9 %
10 SYRINGE (ML) INJECTION EVERY 12 HOURS SCHEDULED
Status: DISCONTINUED | OUTPATIENT
Start: 2024-12-11 | End: 2024-12-11 | Stop reason: HOSPADM

## 2024-12-11 RX ORDER — DIPHENHYDRAMINE HYDROCHLORIDE 50 MG/ML
12.5 INJECTION INTRAMUSCULAR; INTRAVENOUS
Status: DISCONTINUED | OUTPATIENT
Start: 2024-12-11 | End: 2024-12-11 | Stop reason: HOSPADM

## 2024-12-11 RX ORDER — SODIUM CHLORIDE 9 MG/ML
40 INJECTION, SOLUTION INTRAVENOUS AS NEEDED
Status: DISCONTINUED | OUTPATIENT
Start: 2024-12-11 | End: 2024-12-11 | Stop reason: HOSPADM

## 2024-12-11 RX ORDER — SODIUM CHLORIDE, SODIUM LACTATE, POTASSIUM CHLORIDE, CALCIUM CHLORIDE 600; 310; 30; 20 MG/100ML; MG/100ML; MG/100ML; MG/100ML
100 INJECTION, SOLUTION INTRAVENOUS CONTINUOUS
Status: DISCONTINUED | OUTPATIENT
Start: 2024-12-11 | End: 2024-12-11

## 2024-12-11 RX ORDER — FAMOTIDINE 10 MG/ML
20 INJECTION, SOLUTION INTRAVENOUS EVERY 12 HOURS SCHEDULED
Status: DISCONTINUED | OUTPATIENT
Start: 2024-12-11 | End: 2024-12-12 | Stop reason: HOSPADM

## 2024-12-11 RX ORDER — LIDOCAINE HYDROCHLORIDE 10 MG/ML
0.5 INJECTION, SOLUTION INFILTRATION; PERINEURAL ONCE AS NEEDED
Status: DISCONTINUED | OUTPATIENT
Start: 2024-12-11 | End: 2024-12-11 | Stop reason: HOSPADM

## 2024-12-11 RX ORDER — ONDANSETRON 2 MG/ML
4 INJECTION INTRAMUSCULAR; INTRAVENOUS ONCE AS NEEDED
Status: DISCONTINUED | OUTPATIENT
Start: 2024-12-11 | End: 2024-12-11 | Stop reason: HOSPADM

## 2024-12-11 RX ORDER — LABETALOL HYDROCHLORIDE 5 MG/ML
5 INJECTION, SOLUTION INTRAVENOUS
Status: DISCONTINUED | OUTPATIENT
Start: 2024-12-11 | End: 2024-12-11 | Stop reason: HOSPADM

## 2024-12-11 RX ORDER — SODIUM CHLORIDE, SODIUM LACTATE, POTASSIUM CHLORIDE, CALCIUM CHLORIDE 600; 310; 30; 20 MG/100ML; MG/100ML; MG/100ML; MG/100ML
150 INJECTION, SOLUTION INTRAVENOUS CONTINUOUS
Status: DISCONTINUED | OUTPATIENT
Start: 2024-12-11 | End: 2024-12-12 | Stop reason: HOSPADM

## 2024-12-11 RX ORDER — METOCLOPRAMIDE HYDROCHLORIDE 5 MG/ML
10 INJECTION INTRAMUSCULAR; INTRAVENOUS EVERY 6 HOURS
Status: DISCONTINUED | OUTPATIENT
Start: 2024-12-11 | End: 2024-12-12 | Stop reason: HOSPADM

## 2024-12-11 RX ORDER — TRAMADOL HYDROCHLORIDE 50 MG/1
50 TABLET ORAL EVERY 4 HOURS PRN
Status: DISCONTINUED | OUTPATIENT
Start: 2024-12-11 | End: 2024-12-12 | Stop reason: HOSPADM

## 2024-12-11 RX ORDER — FENTANYL CITRATE 50 UG/ML
INJECTION, SOLUTION INTRAMUSCULAR; INTRAVENOUS AS NEEDED
Status: DISCONTINUED | OUTPATIENT
Start: 2024-12-11 | End: 2024-12-11 | Stop reason: SURG

## 2024-12-11 RX ORDER — DIPHENHYDRAMINE HYDROCHLORIDE 50 MG/ML
25 INJECTION INTRAMUSCULAR; INTRAVENOUS EVERY 4 HOURS PRN
Status: DISCONTINUED | OUTPATIENT
Start: 2024-12-11 | End: 2024-12-12 | Stop reason: HOSPADM

## 2024-12-11 RX ORDER — HYDRALAZINE HYDROCHLORIDE 20 MG/ML
5 INJECTION INTRAMUSCULAR; INTRAVENOUS
Status: DISCONTINUED | OUTPATIENT
Start: 2024-12-11 | End: 2024-12-11 | Stop reason: HOSPADM

## 2024-12-11 RX ORDER — IPRATROPIUM BROMIDE AND ALBUTEROL SULFATE 2.5; .5 MG/3ML; MG/3ML
3 SOLUTION RESPIRATORY (INHALATION) ONCE AS NEEDED
Status: DISCONTINUED | OUTPATIENT
Start: 2024-12-11 | End: 2024-12-11 | Stop reason: HOSPADM

## 2024-12-11 RX ORDER — PROMETHAZINE HYDROCHLORIDE 25 MG/1
25 SUPPOSITORY RECTAL ONCE AS NEEDED
Status: DISCONTINUED | OUTPATIENT
Start: 2024-12-11 | End: 2024-12-11 | Stop reason: HOSPADM

## 2024-12-11 RX ORDER — CLONIDINE HYDROCHLORIDE 0.1 MG/1
0.1 TABLET ORAL EVERY 6 HOURS PRN
Status: DISCONTINUED | OUTPATIENT
Start: 2024-12-11 | End: 2024-12-12

## 2024-12-11 RX ORDER — ACETAMINOPHEN 10 MG/ML
INJECTION, SOLUTION INTRAVENOUS AS NEEDED
Status: DISCONTINUED | OUTPATIENT
Start: 2024-12-11 | End: 2024-12-11 | Stop reason: SURG

## 2024-12-11 RX ORDER — SCOLOPAMINE TRANSDERMAL SYSTEM 1 MG/1
1 PATCH, EXTENDED RELEASE TRANSDERMAL ONCE
Status: DISCONTINUED | OUTPATIENT
Start: 2024-12-11 | End: 2024-12-12 | Stop reason: HOSPADM

## 2024-12-11 RX ORDER — CYANOCOBALAMIN 1000 UG/ML
1000 INJECTION, SOLUTION INTRAMUSCULAR; SUBCUTANEOUS ONCE
Status: COMPLETED | OUTPATIENT
Start: 2024-12-12 | End: 2024-12-12

## 2024-12-11 RX ORDER — GABAPENTIN 300 MG/1
600 CAPSULE ORAL ONCE
Status: COMPLETED | OUTPATIENT
Start: 2024-12-11 | End: 2024-12-11

## 2024-12-11 RX ORDER — ONDANSETRON 4 MG/1
4 TABLET, ORALLY DISINTEGRATING ORAL EVERY 6 HOURS PRN
Status: DISCONTINUED | OUTPATIENT
Start: 2024-12-11 | End: 2024-12-12 | Stop reason: HOSPADM

## 2024-12-11 RX ORDER — HYDROMORPHONE HYDROCHLORIDE 1 MG/ML
0.5 INJECTION, SOLUTION INTRAMUSCULAR; INTRAVENOUS; SUBCUTANEOUS
Status: DISCONTINUED | OUTPATIENT
Start: 2024-12-11 | End: 2024-12-11 | Stop reason: HOSPADM

## 2024-12-11 RX ORDER — SODIUM CHLORIDE 0.9 % (FLUSH) 0.9 %
3 SYRINGE (ML) INJECTION EVERY 12 HOURS SCHEDULED
Status: DISCONTINUED | OUTPATIENT
Start: 2024-12-11 | End: 2024-12-11 | Stop reason: HOSPADM

## 2024-12-11 RX ORDER — ONDANSETRON 2 MG/ML
4 INJECTION INTRAMUSCULAR; INTRAVENOUS EVERY 6 HOURS PRN
Status: DISCONTINUED | OUTPATIENT
Start: 2024-12-11 | End: 2024-12-12 | Stop reason: HOSPADM

## 2024-12-11 RX ORDER — PROPOFOL 10 MG/ML
VIAL (ML) INTRAVENOUS AS NEEDED
Status: DISCONTINUED | OUTPATIENT
Start: 2024-12-11 | End: 2024-12-11 | Stop reason: SURG

## 2024-12-11 RX ORDER — HYDROMORPHONE HYDROCHLORIDE 2 MG/1
2 TABLET ORAL EVERY 4 HOURS PRN
Status: DISCONTINUED | OUTPATIENT
Start: 2024-12-11 | End: 2024-12-12 | Stop reason: HOSPADM

## 2024-12-11 RX ORDER — TRANEXAMIC ACID 100 MG/ML
INJECTION, SOLUTION INTRAVENOUS AS NEEDED
Status: DISCONTINUED | OUTPATIENT
Start: 2024-12-11 | End: 2024-12-11 | Stop reason: SURG

## 2024-12-11 RX ORDER — FLUMAZENIL 0.1 MG/ML
0.2 INJECTION INTRAVENOUS AS NEEDED
Status: DISCONTINUED | OUTPATIENT
Start: 2024-12-11 | End: 2024-12-11 | Stop reason: HOSPADM

## 2024-12-11 RX ORDER — ACETAMINOPHEN 160 MG/5ML
975 SOLUTION ORAL EVERY 6 HOURS
Status: DISCONTINUED | OUTPATIENT
Start: 2024-12-11 | End: 2024-12-12 | Stop reason: HOSPADM

## 2024-12-11 RX ORDER — ESMOLOL HYDROCHLORIDE 10 MG/ML
INJECTION INTRAVENOUS AS NEEDED
Status: DISCONTINUED | OUTPATIENT
Start: 2024-12-11 | End: 2024-12-11 | Stop reason: SURG

## 2024-12-11 RX ORDER — ROCURONIUM BROMIDE 10 MG/ML
INJECTION, SOLUTION INTRAVENOUS AS NEEDED
Status: DISCONTINUED | OUTPATIENT
Start: 2024-12-11 | End: 2024-12-11 | Stop reason: SURG

## 2024-12-11 RX ORDER — FENTANYL CITRATE 50 UG/ML
50 INJECTION, SOLUTION INTRAMUSCULAR; INTRAVENOUS
Status: DISCONTINUED | OUTPATIENT
Start: 2024-12-11 | End: 2024-12-11 | Stop reason: HOSPADM

## 2024-12-11 RX ORDER — ALBUTEROL SULFATE 0.83 MG/ML
2.5 SOLUTION RESPIRATORY (INHALATION) EVERY 4 HOURS PRN
Status: DISCONTINUED | OUTPATIENT
Start: 2024-12-11 | End: 2024-12-12 | Stop reason: HOSPADM

## 2024-12-11 RX ORDER — LIDOCAINE HYDROCHLORIDE 20 MG/ML
INJECTION, SOLUTION EPIDURAL; INFILTRATION; INTRACAUDAL; PERINEURAL AS NEEDED
Status: DISCONTINUED | OUTPATIENT
Start: 2024-12-11 | End: 2024-12-11 | Stop reason: SURG

## 2024-12-11 RX ORDER — ATROPINE SULFATE 0.4 MG/ML
0.4 INJECTION, SOLUTION INTRAMUSCULAR; INTRAVENOUS; SUBCUTANEOUS ONCE AS NEEDED
Status: DISCONTINUED | OUTPATIENT
Start: 2024-12-11 | End: 2024-12-11 | Stop reason: HOSPADM

## 2024-12-11 RX ORDER — METOCLOPRAMIDE HYDROCHLORIDE 5 MG/ML
10 INJECTION INTRAMUSCULAR; INTRAVENOUS ONCE
Status: COMPLETED | OUTPATIENT
Start: 2024-12-11 | End: 2024-12-11

## 2024-12-11 RX ORDER — MIDAZOLAM HYDROCHLORIDE 1 MG/ML
1 INJECTION, SOLUTION INTRAMUSCULAR; INTRAVENOUS
Status: DISCONTINUED | OUTPATIENT
Start: 2024-12-11 | End: 2024-12-11 | Stop reason: HOSPADM

## 2024-12-11 RX ORDER — ENOXAPARIN SODIUM 100 MG/ML
40 INJECTION SUBCUTANEOUS
Status: DISCONTINUED | OUTPATIENT
Start: 2024-12-12 | End: 2024-12-12 | Stop reason: HOSPADM

## 2024-12-11 RX ORDER — SODIUM CHLORIDE, SODIUM LACTATE, POTASSIUM CHLORIDE, CALCIUM CHLORIDE 600; 310; 30; 20 MG/100ML; MG/100ML; MG/100ML; MG/100ML
9 INJECTION, SOLUTION INTRAVENOUS CONTINUOUS
Status: DISCONTINUED | OUTPATIENT
Start: 2024-12-11 | End: 2024-12-11

## 2024-12-11 RX ORDER — PROMETHAZINE HYDROCHLORIDE 25 MG/1
25 TABLET ORAL ONCE AS NEEDED
Status: DISCONTINUED | OUTPATIENT
Start: 2024-12-11 | End: 2024-12-11 | Stop reason: HOSPADM

## 2024-12-11 RX ORDER — KETAMINE HCL IN NACL, ISO-OSM 100MG/10ML
SYRINGE (ML) INJECTION AS NEEDED
Status: DISCONTINUED | OUTPATIENT
Start: 2024-12-11 | End: 2024-12-11 | Stop reason: SURG

## 2024-12-11 RX ORDER — SODIUM CHLORIDE 9 MG/ML
INJECTION, SOLUTION INTRAVENOUS AS NEEDED
Status: DISCONTINUED | OUTPATIENT
Start: 2024-12-11 | End: 2024-12-11 | Stop reason: HOSPADM

## 2024-12-11 RX ORDER — SCOLOPAMINE TRANSDERMAL SYSTEM 1 MG/1
1 PATCH, EXTENDED RELEASE TRANSDERMAL CONTINUOUS
Status: DISCONTINUED | OUTPATIENT
Start: 2024-12-11 | End: 2024-12-12 | Stop reason: HOSPADM

## 2024-12-11 RX ORDER — NALOXONE HCL 0.4 MG/ML
0.1 VIAL (ML) INJECTION
Status: DISCONTINUED | OUTPATIENT
Start: 2024-12-11 | End: 2024-12-12 | Stop reason: HOSPADM

## 2024-12-11 RX ORDER — HYDRALAZINE HYDROCHLORIDE 20 MG/ML
10 INJECTION INTRAMUSCULAR; INTRAVENOUS
Status: DISCONTINUED | OUTPATIENT
Start: 2024-12-11 | End: 2024-12-12 | Stop reason: HOSPADM

## 2024-12-11 RX ORDER — SODIUM CHLORIDE 0.9 % (FLUSH) 0.9 %
1-10 SYRINGE (ML) INJECTION AS NEEDED
Status: DISCONTINUED | OUTPATIENT
Start: 2024-12-11 | End: 2024-12-11 | Stop reason: HOSPADM

## 2024-12-11 RX ORDER — FOLIC ACID 1 MG/1
1 TABLET ORAL ONCE
Status: COMPLETED | OUTPATIENT
Start: 2024-12-12 | End: 2024-12-11

## 2024-12-11 RX ORDER — PANTOPRAZOLE SODIUM 40 MG/10ML
40 INJECTION, POWDER, LYOPHILIZED, FOR SOLUTION INTRAVENOUS ONCE
Status: COMPLETED | OUTPATIENT
Start: 2024-12-11 | End: 2024-12-11

## 2024-12-11 RX ORDER — LORAZEPAM 1 MG/1
1 TABLET ORAL EVERY 12 HOURS PRN
Status: DISCONTINUED | OUTPATIENT
Start: 2024-12-11 | End: 2024-12-12 | Stop reason: HOSPADM

## 2024-12-11 RX ORDER — FAMOTIDINE 10 MG/ML
20 INJECTION, SOLUTION INTRAVENOUS ONCE
Status: DISCONTINUED | OUTPATIENT
Start: 2024-12-11 | End: 2024-12-11

## 2024-12-11 RX ORDER — DEXAMETHASONE SODIUM PHOSPHATE 4 MG/ML
INJECTION, SOLUTION INTRA-ARTICULAR; INTRALESIONAL; INTRAMUSCULAR; INTRAVENOUS; SOFT TISSUE AS NEEDED
Status: DISCONTINUED | OUTPATIENT
Start: 2024-12-11 | End: 2024-12-11 | Stop reason: SURG

## 2024-12-11 RX ORDER — HYDROMORPHONE HYDROCHLORIDE 1 MG/ML
0.5 INJECTION, SOLUTION INTRAMUSCULAR; INTRAVENOUS; SUBCUTANEOUS
Status: DISCONTINUED | OUTPATIENT
Start: 2024-12-11 | End: 2024-12-12 | Stop reason: HOSPADM

## 2024-12-11 RX ORDER — DEXMEDETOMIDINE HYDROCHLORIDE 100 UG/ML
INJECTION, SOLUTION INTRAVENOUS AS NEEDED
Status: DISCONTINUED | OUTPATIENT
Start: 2024-12-11 | End: 2024-12-11 | Stop reason: SURG

## 2024-12-11 RX ORDER — ACETAMINOPHEN 500 MG
1000 TABLET ORAL EVERY 6 HOURS
Status: DISCONTINUED | OUTPATIENT
Start: 2024-12-11 | End: 2024-12-12 | Stop reason: HOSPADM

## 2024-12-11 RX ADMIN — HYOSCYAMINE SULFATE 125 MCG: 0.12 TABLET SUBLINGUAL at 21:57

## 2024-12-11 RX ADMIN — ACETAMINOPHEN 1000 MG: 1000 INJECTION INTRAVENOUS at 13:58

## 2024-12-11 RX ADMIN — METOCLOPRAMIDE 10 MG: 5 INJECTION, SOLUTION INTRAMUSCULAR; INTRAVENOUS at 18:03

## 2024-12-11 RX ADMIN — PROPOFOL 50 MG: 10 INJECTION, EMULSION INTRAVENOUS at 13:08

## 2024-12-11 RX ADMIN — HYDROMORPHONE HYDROCHLORIDE 0.5 MG: 1 INJECTION, SOLUTION INTRAMUSCULAR; INTRAVENOUS; SUBCUTANEOUS at 20:15

## 2024-12-11 RX ADMIN — TRANEXAMIC ACID 1000 MG: 100 INJECTION, SOLUTION INTRAVENOUS at 13:55

## 2024-12-11 RX ADMIN — THIAMINE HYDROCHLORIDE 200 MG: 100 INJECTION, SOLUTION INTRAMUSCULAR; INTRAVENOUS at 23:25

## 2024-12-11 RX ADMIN — Medication 20 MG: at 12:53

## 2024-12-11 RX ADMIN — SODIUM CHLORIDE, POTASSIUM CHLORIDE, SODIUM LACTATE AND CALCIUM CHLORIDE: 600; 310; 30; 20 INJECTION, SOLUTION INTRAVENOUS at 14:22

## 2024-12-11 RX ADMIN — DEXMEDETOMIDINE HCL 4 MCG: 100 INJECTION INTRAVENOUS at 14:16

## 2024-12-11 RX ADMIN — HYDROMORPHONE HYDROCHLORIDE 0.5 MG: 1 INJECTION, SOLUTION INTRAMUSCULAR; INTRAVENOUS; SUBCUTANEOUS at 14:25

## 2024-12-11 RX ADMIN — DEXMEDETOMIDINE HCL 4 MCG: 100 INJECTION INTRAVENOUS at 14:18

## 2024-12-11 RX ADMIN — MIRTAZAPINE 15 MG: 15 TABLET, ORALLY DISINTEGRATING ORAL at 23:25

## 2024-12-11 RX ADMIN — PROPOFOL 160 MG: 10 INJECTION, EMULSION INTRAVENOUS at 12:50

## 2024-12-11 RX ADMIN — LORAZEPAM 1 MG: 1 TABLET ORAL at 19:04

## 2024-12-11 RX ADMIN — ROCURONIUM BROMIDE 50 MG: 10 INJECTION, SOLUTION INTRAVENOUS at 12:50

## 2024-12-11 RX ADMIN — FENTANYL CITRATE 50 MCG: 50 INJECTION, SOLUTION INTRAMUSCULAR; INTRAVENOUS at 14:16

## 2024-12-11 RX ADMIN — DEXMEDETOMIDINE HCL 4 MCG: 100 INJECTION INTRAVENOUS at 14:21

## 2024-12-11 RX ADMIN — DEXAMETHASONE SODIUM PHOSPHATE 8 MG: 4 INJECTION, SOLUTION INTRAMUSCULAR; INTRAVENOUS at 12:55

## 2024-12-11 RX ADMIN — FENTANYL CITRATE 50 MCG: 50 INJECTION, SOLUTION INTRAMUSCULAR; INTRAVENOUS at 13:08

## 2024-12-11 RX ADMIN — SODIUM CHLORIDE, POTASSIUM CHLORIDE, SODIUM LACTATE AND CALCIUM CHLORIDE 500 ML: 600; 310; 30; 20 INJECTION, SOLUTION INTRAVENOUS at 09:23

## 2024-12-11 RX ADMIN — LIDOCAINE HYDROCHLORIDE 100 MG: 20 INJECTION, SOLUTION EPIDURAL; INFILTRATION; INTRACAUDAL; PERINEURAL at 12:50

## 2024-12-11 RX ADMIN — LABETALOL HYDROCHLORIDE 10 MG: 5 INJECTION, SOLUTION INTRAVENOUS at 21:49

## 2024-12-11 RX ADMIN — HYDROMORPHONE HYDROCHLORIDE 0.5 MG: 1 INJECTION, SOLUTION INTRAMUSCULAR; INTRAVENOUS; SUBCUTANEOUS at 15:40

## 2024-12-11 RX ADMIN — PROPOFOL 150 MCG/KG/MIN: 10 INJECTION, EMULSION INTRAVENOUS at 12:51

## 2024-12-11 RX ADMIN — FOLIC ACID 1 MG: 1 TABLET ORAL at 23:25

## 2024-12-11 RX ADMIN — ROCURONIUM BROMIDE 25 MG: 10 INJECTION, SOLUTION INTRAVENOUS at 13:53

## 2024-12-11 RX ADMIN — LOSARTAN POTASSIUM 100 MG: 100 TABLET, FILM COATED ORAL at 18:04

## 2024-12-11 RX ADMIN — METOCLOPRAMIDE 10 MG: 5 INJECTION, SOLUTION INTRAMUSCULAR; INTRAVENOUS at 09:22

## 2024-12-11 RX ADMIN — ACETAMINOPHEN 1000 MG: 500 TABLET, FILM COATED ORAL at 20:10

## 2024-12-11 RX ADMIN — ROCURONIUM BROMIDE 25 MG: 10 INJECTION, SOLUTION INTRAVENOUS at 13:16

## 2024-12-11 RX ADMIN — HYOSCYAMINE SULFATE 125 MCG: 0.12 TABLET SUBLINGUAL at 16:16

## 2024-12-11 RX ADMIN — HYDROMORPHONE HYDROCHLORIDE 0.5 MG: 1 INJECTION, SOLUTION INTRAMUSCULAR; INTRAVENOUS; SUBCUTANEOUS at 14:56

## 2024-12-11 RX ADMIN — ESMOLOL HYDROCHLORIDE 10 MG: 10 INJECTION, SOLUTION INTRAVENOUS at 13:23

## 2024-12-11 RX ADMIN — MAGNESIUM SULFATE HEPTAHYDRATE 2 G: 500 INJECTION, SOLUTION INTRAMUSCULAR; INTRAVENOUS at 13:02

## 2024-12-11 RX ADMIN — DEXMEDETOMIDINE HCL 4 MCG: 100 INJECTION INTRAVENOUS at 14:11

## 2024-12-11 RX ADMIN — HYDROMORPHONE HYDROCHLORIDE 0.5 MG: 1 INJECTION, SOLUTION INTRAMUSCULAR; INTRAVENOUS; SUBCUTANEOUS at 17:59

## 2024-12-11 RX ADMIN — PANTOPRAZOLE SODIUM 40 MG: 40 INJECTION, POWDER, FOR SOLUTION INTRAVENOUS at 09:22

## 2024-12-11 RX ADMIN — DEXMEDETOMIDINE HCL 4 MCG: 100 INJECTION INTRAVENOUS at 13:58

## 2024-12-11 RX ADMIN — 0.12% CHLORHEXIDINE GLUCONATE 15 ML: 1.2 RINSE ORAL at 09:34

## 2024-12-11 RX ADMIN — SODIUM CHLORIDE 3 G: 900 INJECTION INTRAVENOUS at 12:38

## 2024-12-11 RX ADMIN — FAMOTIDINE 20 MG: 10 INJECTION INTRAVENOUS at 20:10

## 2024-12-11 RX ADMIN — ONDANSETRON 4 MG: 2 INJECTION INTRAMUSCULAR; INTRAVENOUS at 12:55

## 2024-12-11 RX ADMIN — SODIUM CHLORIDE, SODIUM LACTATE, POTASSIUM CHLORIDE, CALCIUM CHLORIDE 150 ML/HR: 20; 30; 600; 310 INJECTION, SOLUTION INTRAVENOUS at 23:24

## 2024-12-11 RX ADMIN — TRAMADOL HYDROCHLORIDE 50 MG: 50 TABLET, COATED ORAL at 21:57

## 2024-12-11 RX ADMIN — SUGAMMADEX 300 MG: 100 INJECTION, SOLUTION INTRAVENOUS at 14:07

## 2024-12-11 RX ADMIN — SCOPOLAMINE 1 PATCH: 1.5 PATCH, EXTENDED RELEASE TRANSDERMAL at 09:22

## 2024-12-11 RX ADMIN — ONDANSETRON 4 MG: 2 INJECTION INTRAMUSCULAR; INTRAVENOUS at 14:14

## 2024-12-11 RX ADMIN — SODIUM CHLORIDE, POTASSIUM CHLORIDE, SODIUM LACTATE AND CALCIUM CHLORIDE 9 ML/HR: 600; 310; 30; 20 INJECTION, SOLUTION INTRAVENOUS at 15:31

## 2024-12-11 RX ADMIN — LABETALOL HYDROCHLORIDE 200 MG: 200 TABLET, FILM COATED ORAL at 19:04

## 2024-12-11 RX ADMIN — GABAPENTIN 600 MG: 300 CAPSULE ORAL at 09:22

## 2024-12-11 NOTE — OP NOTE
PREOPERATIVE DIAGNOSIS:  Morbid obesity with multiple comorbidities as referenced in the most recent history and physical.  Paraesophageal hernia.  History of sleeve gastrectomy    POSTOPERATIVE DIAGNOSES:  Morbid obesity with multiple comorbidities as referenced in the most recent history and physical.  Paraesophageal hernia.  History of sleeve gastrectomy    PROCEDURES PERFORMED:  1.  Robotic antecolic antegastric [place] Loop Shannan-en-Y divided gastric bypass conversion from gastric sleeve.  2.  Robotic paraesophageal hernia repair  3.  Robotic lysis of adhesions    SURGEON: Shady Betancourt Jr., MD    ASSISTANT: JORDY Osorio      Approximately 25 minutes was spent taking down the extensive adhesions from previous bariatric surgery. This is a conversion procedure therefore the complexity of the case was increased.  Patient had extensive adhesions from previous bariatric surgery increasing the difficulty of this procedure as well as the length of time of the procedure..    Surgery assisted and facilitated by a certified physician assistant, who directly resulted in a decreased operative time, anesthetic time, wound exposure, and possibly of an operative wound infection, thereby decreasing patient morbidity and ultimately total expenditures.      ANESTHESIA: General endotracheal and laparoscopic TAP block with Ropivacaine mixture    ESTIMATED BLOOD LOSS:  Less than 25 mL unless dictated below.    SPECIMENS:  None.    DRAINS:  none    COUNTS:  Correct.    COMPLICATIONS:  None.      INDICATIONS:   This patient presents for elective Robotic Shannan-en-Y divided gastric bypass. The patient has undergone our extensive preoperative evaluation, teaching and consent process.       Description of Procedure: The patient was brought to the operating room and placed supine upon the operating room table. SCD were placed.  The patient underwent uneventful general endotracheal anesthesia per the anesthesiology staff. The  abdomen was prepped with ChloraPrep and draped in the usual sterile fashion.  Anesthesia staff passed a 36-Turkish bougie into the stomach to decompress the stomach and then was pulled back to the esophagus.      An 8 mm transverse incision was made just to the left of midline.  The peritoneal cavity was entered under direct camera visualization using a 5  mm 0° laparoscope and an Optiview trocar.  The abdomen was then insufflated to a pressure of 8-12 mmHg with CO2 gas with AirSeal.  Exploratory laparoscopy revealed no evidence of injury from the entrance technique and no significant abnormalities unless addendum dictated below.  An angled laparoscope was then used.  Under direct camera visualization two 8 mm trocars were placed in the left lateral midabdominal position.  A 12 mm trocar was placed in the right abdomen.  A Mahsa retractor was placed through an epigastric incision and used to elevate the left lobe of the liver.  The patient was then placed in slight reverse Trendelenburg.     Next the 30 degree 8 mm scope was brought into the 8 mm port just to the left of the umbilicus after the corresponding trocar was docked to the da Jarocho robot.  Targeting then took place and then the rest of the trochars were docked to the robot and angled into position.  Instruments were brought in through the trochars in place for laparoscopic view.       I then took control of the surgical console.  Adhesions from previous gastric sleeve surgery were taken down using the vessel sealer.  The Mahsa retractor was completely freed up if needed after taking down adhesions.    I then chose an area along the lesser curve of the stomach approximately 4-6 centimeters distally to the GE junction and began dissection with the vessel sealer.  I either entered the retrogastric space using careful dissection with the vessel sealer or using pars flaccida with a white load 60 mm Sureform stapler.  I then placed a  60 mm white or blue  load in this area for a horizontal staple line to start forming the gastric pouch.  The cartridges size depended on tissue thickness.  An extra load was used if the pouch was not completely transected horizontally.  This depended on previous sleeve size.    I divided the omentum with the vessel sealer starting with the area near the transverse colon and worked my way superiorly to make way for the Shannan limb.     I then identified the ligament of Treitz and ran the bowel distally 75 cm and brought this loop up to the gastric pouch.  With the proximal biliary limb on the patient left side and the distal alimentary limb on the right side, I performed a linear gastrojejunostomy by making a gastrotomy in the gastric pouch on the posterior side near the staple line in the midline of this region and then also made an enterotomy in the jejunum.  I used a 60 mm Sureform stapler with a white load and made the stoma 30 mm.  I was then able to inspect the staple line and there was no bleeding.  I then closed the enterotomy in two layers with a running 2-0 absorbable Vicryl suture x 2.      I used a white load and fired it across the jejunum just to the left of the gastrojejunostomy and then at that point the limb on the patient's left side was the biliopancreatic limb.      After this I ran the Shannan limb distally 150 cm and then performed a side-to-side anastomosis with two white loads using the 60 mm Sureform stapler going proximally and distally.  The common enterotomy was then closed with 2-0 Vicryl suture in a running fashion in 2 layers or 60 mm Sureform stapler with a white load.    I then closed the mesenteric defect at the jejunojejunostomy with a running 2-0 nonabsorbable silk suture and I also closed Wheatley space with a running 2-0 nonabsorbable silk suture.      At this point I performed a leak test.  The anastomosis was submerged under saline and the area was insufflated with air.  No air bubbles were seen unless  dictated below.  The bougie was also passed across the stoma without difficulty.  The irrigation fluid was suctioned out.    Sealant was sprayed over the gastrojejunostomy, jejunojejunostomy and also the mesentery between the distal biliary limb and the gastrojejunostomy.  The liver retractor was removed.    Then the abdomen was desufflated and all the trochars were removed under direct visualization.  No bleeding was noted.  All incisions were infiltrated with the local anesthetic.  All the skin incisions were closed with 4-0 Monocryl and surgical glue.    The patient was noted to have a paraesophageal hernia.  The phrenoesophageal membrane was opened up with vessel sealer and the right and left crura were cleaned off using sharp and blunt dissection.  The peritoneum overlying the right raphael was incised and the plane along the hernia sac was developed.  The dissection then extended anteriorly and laterally to the left raphael.  The base of the crural confluence was dissected free of adhesions to the hernia sac.  The hernia sac was carefully dissected into the mediastinum with caudal traction.  The interfaces between the pleura, pericardium, spine, and aorta were developed as a dissection was carried cephalically to the top of the hernia sac.  Sac contents were completely reduced back into the abdominal cavity.  Careful attention was made not to injure the vagus nerve.  The esophagus was identified and dissected circumferentially and along its previous stomach course in order to reduce tension, allowing the gastroesophageal junction to rest comfortably within the abdominal cavity. The retroesophageal window was developed and the esophagus was retracted caudally.  The crural pillars were then approximated with 0 V Loc nonabsorbable suture around the esophagus

## 2024-12-11 NOTE — ANESTHESIA PROCEDURE NOTES
Airway  Date/Time: 12/11/2024 12:52 PM  End Time:12/11/2024 12:53 PM  Airway not difficult    General Information and Staff    Patient location during procedure: OR  Anesthesiologist: Wade Lazcano MD  CRNA/CAA: Mariam Ybarra CRNA    Indications and Patient Condition  Indications for airway management: airway protection    Preoxygenated: yes  MILS maintained throughout  Mask difficulty assessment: 1 - vent by mask    Final Airway Details  Final airway type: endotracheal airway      Successful airway: ETT  Cuffed: yes   Successful intubation technique: direct laryngoscopy  Facilitating devices/methods: intubating stylet  Endotracheal tube insertion site: oral  Blade: Yvrose  Blade size: 3  Cormack-Lehane Classification: grade I - full view of glottis  Placement verified by: chest auscultation and capnometry   Cuff volume (mL): 8  Measured from: lips  ETT/EBT  to lips (cm): 22  Number of attempts at approach: 1  Assessment: lips, teeth, and gum same as pre-op and atraumatic intubation

## 2024-12-11 NOTE — ANESTHESIA PREPROCEDURE EVALUATION
Anesthesia Evaluation     NPO Solid Status: > 8 hours  NPO Liquid Status: > 4 hours           Airway   Mallampati: II  No difficulty expected  Dental      Pulmonary    (+) ,sleep apnea  Cardiovascular   Exercise tolerance: good (4-7 METS)    (+) hypertension, hyperlipidemia      Neuro/Psych  (+) headaches, psychiatric history  GI/Hepatic/Renal/Endo    (+) morbid obesity, hiatal hernia, GERD, renal disease-    Musculoskeletal     Abdominal    Substance History      OB/GYN          Other                    Anesthesia Plan    ASA 3     general   total IV anesthesia  intravenous induction     Anesthetic plan, risks, benefits, and alternatives have been provided, discussed and informed consent has been obtained with: patient.    CODE STATUS:

## 2024-12-11 NOTE — PLAN OF CARE
Goal Outcome Evaluation:              Outcome Evaluation: VSS. IV fluids infusing. Lap sites with glue. Ambulated in hallway. Voided. SCDs on. Levsin given for gas discomfort.

## 2024-12-11 NOTE — ANESTHESIA POSTPROCEDURE EVALUATION
Patient: Lizette Connolly    Procedure Summary       Date: 12/11/24 Room / Location:  [facility]  [facility]  [facility]    Anesthesia Start: 1242 Anesthesia Stop: 1422    Procedure: Shannan-en-Y GASTRIC BYPASS LAPAROSCOPIC AND ROBOTIC CONVERSION, Paraesophageal Hernia Repair, Lysis of Adhesions (Abdomen) Diagnosis:       Obesity, Class III, BMI 40-49.9 (morbid obesity)      (Obesity, Class III, BMI 40-49.9 (morbid obesity) [E66.01])    Surgeons: Shady Betancourt Jr., MD Provider: Wade Lazcano MD    Anesthesia Type: general ASA Status: 3            Anesthesia Type: general    Vitals  Vitals Value Taken Time   /102 12/11/24 1510   Temp 36.4 °C (97.6 °F) 12/11/24 1419   Pulse 54 12/11/24 1515   Resp 14 12/11/24 1500   SpO2 100 % 12/11/24 1515   Vitals shown include unfiled device data.        Post Anesthesia Care and Evaluation    Patient location during evaluation: bedside  Patient participation: complete - patient participated  Level of consciousness: awake and alert  Pain management: adequate    Airway patency: patent  Anesthetic complications: No anesthetic complications    Cardiovascular status: acceptable  Respiratory status: acceptable  Hydration status: acceptable    Comments: /99   Pulse 54   Temp 36.4 °C (97.6 °F) (Oral)   Resp 14   SpO2 100%     
97

## 2024-12-12 VITALS
HEIGHT: 67 IN | OXYGEN SATURATION: 100 % | DIASTOLIC BLOOD PRESSURE: 82 MMHG | BODY MASS INDEX: 39.41 KG/M2 | TEMPERATURE: 96.8 F | SYSTOLIC BLOOD PRESSURE: 133 MMHG | HEART RATE: 68 BPM | RESPIRATION RATE: 16 BRPM | WEIGHT: 251.1 LBS

## 2024-12-12 LAB
ALBUMIN SERPL-MCNC: 3.1 G/DL (ref 3.5–5.2)
ALBUMIN/GLOB SERPL: 1.1 G/DL
ALP SERPL-CCNC: 67 U/L (ref 39–117)
ALT SERPL W P-5'-P-CCNC: 21 U/L (ref 1–33)
ANION GAP SERPL CALCULATED.3IONS-SCNC: 9.8 MMOL/L (ref 5–15)
AST SERPL-CCNC: 34 U/L (ref 1–32)
BASOPHILS # BLD AUTO: 0.03 10*3/MM3 (ref 0–0.2)
BASOPHILS NFR BLD AUTO: 0.2 % (ref 0–1.5)
BILIRUB SERPL-MCNC: 0.2 MG/DL (ref 0–1.2)
BUN SERPL-MCNC: 9 MG/DL (ref 6–20)
BUN/CREAT SERPL: 10.7 (ref 7–25)
CALCIUM SPEC-SCNC: 8.6 MG/DL (ref 8.6–10.5)
CHLORIDE SERPL-SCNC: 106 MMOL/L (ref 98–107)
CO2 SERPL-SCNC: 23.2 MMOL/L (ref 22–29)
CREAT SERPL-MCNC: 0.84 MG/DL (ref 0.57–1)
DEPRECATED RDW RBC AUTO: 40.4 FL (ref 37–54)
EGFRCR SERPLBLD CKD-EPI 2021: 88.6 ML/MIN/1.73
EOSINOPHIL # BLD AUTO: 0 10*3/MM3 (ref 0–0.4)
EOSINOPHIL NFR BLD AUTO: 0 % (ref 0.3–6.2)
ERYTHROCYTE [DISTWIDTH] IN BLOOD BY AUTOMATED COUNT: 12 % (ref 12.3–15.4)
GLOBULIN UR ELPH-MCNC: 2.9 GM/DL
GLUCOSE SERPL-MCNC: 104 MG/DL (ref 65–99)
HCT VFR BLD AUTO: 39.1 % (ref 34–46.6)
HGB BLD-MCNC: 13.3 G/DL (ref 12–15.9)
IMM GRANULOCYTES # BLD AUTO: 0.07 10*3/MM3 (ref 0–0.05)
IMM GRANULOCYTES NFR BLD AUTO: 0.5 % (ref 0–0.5)
LYMPHOCYTES # BLD AUTO: 1.75 10*3/MM3 (ref 0.7–3.1)
LYMPHOCYTES NFR BLD AUTO: 13.1 % (ref 19.6–45.3)
MAGNESIUM SERPL-MCNC: 2 MG/DL (ref 1.6–2.6)
MCH RBC QN AUTO: 30.9 PG (ref 26.6–33)
MCHC RBC AUTO-ENTMCNC: 34 G/DL (ref 31.5–35.7)
MCV RBC AUTO: 90.7 FL (ref 79–97)
MONOCYTES # BLD AUTO: 0.83 10*3/MM3 (ref 0.1–0.9)
MONOCYTES NFR BLD AUTO: 6.2 % (ref 5–12)
NEUTROPHILS NFR BLD AUTO: 10.65 10*3/MM3 (ref 1.7–7)
NEUTROPHILS NFR BLD AUTO: 80 % (ref 42.7–76)
NRBC BLD AUTO-RTO: 0 /100 WBC (ref 0–0.2)
PHOSPHATE SERPL-MCNC: 4.2 MG/DL (ref 2.5–4.5)
PLATELET # BLD AUTO: 299 10*3/MM3 (ref 140–450)
PMV BLD AUTO: 10.8 FL (ref 6–12)
POTASSIUM SERPL-SCNC: 4.7 MMOL/L (ref 3.5–5.2)
PROT SERPL-MCNC: 6 G/DL (ref 6–8.5)
RBC # BLD AUTO: 4.31 10*6/MM3 (ref 3.77–5.28)
SODIUM SERPL-SCNC: 139 MMOL/L (ref 136–145)
WBC NRBC COR # BLD AUTO: 13.33 10*3/MM3 (ref 3.4–10.8)

## 2024-12-12 PROCEDURE — 84100 ASSAY OF PHOSPHORUS: CPT | Performed by: SURGERY

## 2024-12-12 PROCEDURE — 25010000002 CYANOCOBALAMIN PER 1000 MCG: Performed by: SURGERY

## 2024-12-12 PROCEDURE — 85025 COMPLETE CBC W/AUTO DIFF WBC: CPT | Performed by: SURGERY

## 2024-12-12 PROCEDURE — 25010000002 HYDRALAZINE PER 20 MG: Performed by: SURGERY

## 2024-12-12 PROCEDURE — 25010000002 ENOXAPARIN PER 10 MG: Performed by: SURGERY

## 2024-12-12 PROCEDURE — 80053 COMPREHEN METABOLIC PANEL: CPT | Performed by: SURGERY

## 2024-12-12 PROCEDURE — 25810000003 LACTATED RINGERS PER 1000 ML: Performed by: SURGERY

## 2024-12-12 PROCEDURE — 83735 ASSAY OF MAGNESIUM: CPT | Performed by: SURGERY

## 2024-12-12 RX ORDER — ONDANSETRON 4 MG/1
4 TABLET, ORALLY DISINTEGRATING ORAL EVERY 4 HOURS PRN
Qty: 30 TABLET | Refills: 0 | Status: SHIPPED | OUTPATIENT
Start: 2024-12-12

## 2024-12-12 RX ORDER — SUCRALFATE 1 G/1
1 TABLET ORAL 3 TIMES DAILY
Qty: 90 TABLET | Refills: 1 | Status: SHIPPED | OUTPATIENT
Start: 2024-12-12

## 2024-12-12 RX ORDER — TRAMADOL HYDROCHLORIDE 50 MG/1
50 TABLET ORAL EVERY 6 HOURS PRN
Qty: 12 TABLET | Refills: 0 | Status: SHIPPED | OUTPATIENT
Start: 2024-12-12 | End: 2024-12-16 | Stop reason: SDUPTHER

## 2024-12-12 RX ORDER — CLONIDINE HYDROCHLORIDE 0.1 MG/1
0.1 TABLET ORAL EVERY 6 HOURS PRN
Status: DISCONTINUED | OUTPATIENT
Start: 2024-12-12 | End: 2024-12-12 | Stop reason: HOSPADM

## 2024-12-12 RX ORDER — ENOXAPARIN SODIUM 100 MG/ML
40 INJECTION SUBCUTANEOUS EVERY 12 HOURS SCHEDULED
Qty: 11.2 ML | Refills: 0 | Status: SHIPPED | OUTPATIENT
Start: 2024-12-12 | End: 2024-12-26

## 2024-12-12 RX ADMIN — LOSARTAN POTASSIUM 100 MG: 100 TABLET, FILM COATED ORAL at 09:37

## 2024-12-12 RX ADMIN — ACETAMINOPHEN 1000 MG: 500 TABLET, FILM COATED ORAL at 09:37

## 2024-12-12 RX ADMIN — CYANOCOBALAMIN 1000 MCG: 1000 INJECTION, SOLUTION INTRAMUSCULAR; SUBCUTANEOUS at 09:38

## 2024-12-12 RX ADMIN — ENOXAPARIN SODIUM 40 MG: 100 INJECTION SUBCUTANEOUS at 09:38

## 2024-12-12 RX ADMIN — ACETAMINOPHEN 1000 MG: 500 TABLET, FILM COATED ORAL at 02:51

## 2024-12-12 RX ADMIN — SODIUM CHLORIDE, SODIUM LACTATE, POTASSIUM CHLORIDE, CALCIUM CHLORIDE 150 ML/HR: 20; 30; 600; 310 INJECTION, SOLUTION INTRAVENOUS at 06:04

## 2024-12-12 RX ADMIN — FAMOTIDINE 20 MG: 10 INJECTION INTRAVENOUS at 09:37

## 2024-12-12 RX ADMIN — TRAMADOL HYDROCHLORIDE 50 MG: 50 TABLET, COATED ORAL at 05:04

## 2024-12-12 RX ADMIN — HYOSCYAMINE SULFATE 125 MCG: 0.12 TABLET SUBLINGUAL at 05:04

## 2024-12-12 RX ADMIN — HYDROMORPHONE HYDROCHLORIDE 2 MG: 2 TABLET ORAL at 00:08

## 2024-12-12 RX ADMIN — TRAMADOL HYDROCHLORIDE 50 MG: 50 TABLET, COATED ORAL at 09:37

## 2024-12-12 RX ADMIN — HYDROMORPHONE HYDROCHLORIDE 2 MG: 2 TABLET ORAL at 06:04

## 2024-12-12 RX ADMIN — HYDRALAZINE HYDROCHLORIDE 10 MG: 20 INJECTION, SOLUTION INTRAMUSCULAR; INTRAVENOUS at 00:22

## 2024-12-12 NOTE — CONSULTS
A Consult H&P    Patient Care Team:  Trena Ferro APRN as PCP - General (Nurse Practitioner)    Requesting Physician: Dr. Betancourt    Reason for Consult: Postoperative management of hypertension    History of Present Illness    This is a 44-year-old female with history of hypertension, hyperlipidemia, anxiety and depression along with obesity who underwent gastric bypass surgery yesterday.  I have been asked to see her in the postoperative setting for management of her hypertension.  She takes losartan.  Blood pressure was elevated postoperatively but now much better controlled today.  Her only complaint is that of abdominal soreness.  She denies any chest pain or shortness of breath.    Past Medical History:   Diagnosis Date    Anxiety and depression     Chronic nausea     S/P GASTRIC SLEEVE    GERD (gastroesophageal reflux disease)     Hip pain 01/07/2019    RIGHT    HPV in female     Hypertension     Kidney stones 01/07/2019    Lactose intolerance May 2020    After gadtric sleeve    Migraine     PCOS (polycystic ovarian syndrome)     Sleep apnea     GONE    Vasospasm      Past Surgical History:   Procedure Laterality Date    COLONOSCOPY N/A 08/10/2022    Procedure: COLONOSCOPY TO CECUM AND TI wtih biopsies;  Surgeon: Cosmo Cordoba MD;  Location: [facility];  Service: Gastroenterology;  Laterality: N/A;  Pre: Abdominal pain, abnormal Ct  Post: diverticulosis, hemorrhoids    ENDOSCOPY  2010    ENDOSCOPY N/A 03/03/2020    Procedure: ESOPHAGOGASTRODUODENOSCOPY WITH BIOPSY;  Surgeon: Shady Betancourt Jr., MD;  Location: [facility];  Service: General;  Laterality: N/A;  PRE- GERD  POST- GASTRITIS    ENDOSCOPY N/A 04/27/2021    Procedure: ESOPHAGOGASTRODUODENOSCOPY WITH BALLOON DILATATION 18-20MM, BIOPSIES;  Surgeon: Shady Betancourt Jr., MD;  Location: [facility] END[facility]COPY;  Service: General;  Laterality: N/A;  PRE-H/O SLEEVE, NAUSEA  POST- GASTRITIS    ENDOSCOPY N/A 11/16/2021    Procedure:  ESOPHAGOGASTRODUODENOSCOPY WITH PYLORIC DILATATION, BIOPSIES;  Surgeon: Shady Betancourt Jr., MD;  Location:  [facility];  Service: General;  Laterality: N/A;  PRE- GERD, H/O SLEEVE  POST- GASTRITIS    ENDOSCOPY N/A 05/16/2023    Procedure: ESOPHAGOGASTRODUODENOSCOPY WITH COLD BIOPSIES, BALLOON DILATATION 18-20MM;  Surgeon: Shady Betancourt Jr., MD;  Location:  [facility];  Service: General;  Laterality: N/A;  PRE- DYSPHAGIA, H/O SLEEVE  POST- GASTRITIS    ENDOSCOPY N/A 05/07/2024    Procedure: ESOPHAGOGASTRODUODENOSCOPY WITH BALLOON DILATATION AND BIOPSY;  Surgeon: Shady Betancourt Jr., MD;  Location:  [facility];  Service: General;  Laterality: N/A;  PRE- NAUSEA, H/O SLEEVE  POST- GASTRITIS    GASTRIC SLEEVE LAPAROSCOPIC N/A 05/20/2020    Procedure: GASTRIC SLEEVE LAPAROSCOPIC;  Surgeon: Shady Betancourt Jr., MD;  [place] [facility] [place] [facility] [place]  Service: Bariatric;  Laterality: N/A;    LAPAROSCOPIC CHOLECYSTECTOMY  2010    MANDIBLE SURGERY      ORIF ANKLE FRACTURE Right     UPPER GASTROINTESTINAL ENDOSCOPY  2021     Family History   Problem Relation Age of Onset    Breast cancer Maternal Grandmother     Hypertension Maternal Grandmother     Cancer Maternal Grandmother         BREAST & BONE    Obesity Mother     Diabetes Mother     Hypertension Mother     Heart attack Mother     Heart disease Mother     Sleep apnea Mother     Hypertension Father     Heart disease Brother     Hypertension Maternal Grandfather     Heart disease Maternal Grandfather     Cancer Maternal Grandfather     Hypertension Paternal Grandmother     Multiple sclerosis Paternal Grandmother     Hypertension Paternal Grandfather     Cancer Paternal Grandfather         LUNG    Malig Hyperthermia Neg Hx      Social History     Tobacco Use    Smoking status: Never    Smokeless tobacco: Never   Vaping Use    Vaping status: Never Used   Substance Use Topics    Alcohol use: Not Currently    Drug use: No     Medications Prior to  Admission   Medication Sig Dispense Refill Last Dose/Taking    albuterol sulfate  (90 Base) MCG/ACT inhaler    Past Month    colestipol (COLESTID) 1 g tablet TAKE 1 TABLET BY MOUTH DAILY (Patient taking differently: Take 1 tablet by mouth Daily. prn) 30 tablet 2 Past Month    FLUoxetine (PROzac) 20 MG capsule Take 3 capsules by mouth Daily.   12/10/2024    fluticasone (FLONASE) 50 MCG/ACT nasal spray    12/10/2024    folic acid-vit B6-vit B12 (FOLBEE) 2.5-25-1 MG tablet tablet Take 1 tablet by mouth Daily. 40 tablet 0 Past Month    Fremanezumab-vfrm (Ajovy) 225 MG/1.5ML solution auto-injector Inject 225 mg under the skin into the appropriate area as directed Every 30 (Thirty) Days.   Past Month    gabapentin (NEURONTIN) 100 MG capsule Take 1 capsule by mouth 3 (Three) Times a Day.   12/10/2024    hydrOXYzine (ATARAX) 25 MG tablet Take 1 tablet by mouth As Needed.   12/10/2024    hyoscyamine (LEVSIN) 0.125 MG SL tablet DISSOLVE 1 TABLET UNDER THE TONGUE EVERY 4 HOURS AS NEEDED FOR SPASMS 30 tablet 0 Past Month    linaclotide (Linzess) 145 MCG capsule capsule Take 1 capsule by mouth Every Morning Before Breakfast. (Patient taking differently: Take 1 capsule by mouth Every Morning Before Breakfast. prn) 30 capsule 11 Past Month    Magnesium Oxide -Mg Supplement 400 (240 Mg) MG tablet Take  by mouth Daily.   Past Month    montelukast (SINGULAIR) 10 MG tablet Take 1 tablet by mouth Daily.   12/10/2024    ondansetron (ZOFRAN) 8 MG tablet TAKE 1 TABLET BY MOUTH EVERY 8 HOURS AS NEEDED FOR NAUSEA AND/OR VOMITING 30 tablet 0 Past Month    pantoprazole (PROTONIX) 20 MG EC tablet TAKE 1 TABLET BY MOUTH 2 TIMES A DAY 60 tablet 1 12/10/2024    SUMAtriptan (IMITREX) 100 MG tablet TAKE 1 TABLET BY MOUTH AT ONSET OF HEADACHE; MAY REPEAT 1 TABLET IN 2 HOURS IF NEEDED. MAX 2 TABLETS IN 24 HOURS   Past Month    SUMAtriptan Succinate (IMITREX) 6 MG/0.5ML injection    Past Month    ubrogepant (UBRELVY) 100 MG tablet Take 1 tablet  by mouth As Needed.   Past Month    levonorgestrel (MIRENA) 20 MCG/24HR IUD 1 each by Intrauterine route 1 (One) Time.   More than a month    losartan (COZAAR) 100 MG tablet Take 1 tablet by mouth Daily. Hold 24 prior to surgey   2024    Omega-3 Fatty Acids (FISH OIL) 1000 MG capsule capsule Take 1 capsule by mouth Daily With Breakfast. HELD FOR ENDO X 2 WEEKS   More than a month    [DISCONTINUED] Enoxaparin Sodium (LOVENOX) 40 MG/0.4ML solution prefilled syringe syringe Inject 0.4 mL under the skin into the appropriate area as directed Every 12 (Twelve) Hours for 14 days. Start after surgery unless instructed otherwise 11.2 mL 0      Allergies:  Prochlorperazine    Review of Systems   Constitutional:  Negative for chills and fever.   HENT:  Negative for congestion and sore throat.    Eyes:  Negative for visual disturbance.   Respiratory:  Negative for cough, chest tightness, shortness of breath and wheezing.    Cardiovascular:  Negative for chest pain, palpitations and leg swelling.   Gastrointestinal:  Negative for abdominal distention, abdominal pain, diarrhea, nausea and vomiting.   Endocrine: Negative for polydipsia and polyuria.   Genitourinary:  Negative for difficulty urinating, dysuria, frequency and urgency.   Musculoskeletal:  Negative for arthralgias and myalgias.   Skin:  Negative for color change and rash.   Neurological:  Negative for dizziness and light-headedness.        PHYSICAL EXAM    Vital Signs  tMax 24 hrs:  Temp (24hrs), Av.8 °F (36.6 °C), Min:97.3 °F (36.3 °C), Max:98.1 °F (36.7 °C)    Vitals Ranges:  Temp:  [97.3 °F (36.3 °C)-98.1 °F (36.7 °C)] 98 °F (36.7 °C)  Heart Rate:  [52-93] 52  Resp:  [14-20] 16  BP: (129-182)/() 129/84    Physical Exam  Vitals and nursing note reviewed.   Constitutional:       General: She is not in acute distress.  HENT:      Head: Normocephalic and atraumatic.   Eyes:      Extraocular Movements: Extraocular movements intact.      Pupils: Pupils are  equal, round, and reactive to light.   Cardiovascular:      Rate and Rhythm: Normal rate and regular rhythm.   Pulmonary:      Effort: Pulmonary effort is normal. No respiratory distress.      Breath sounds: Normal breath sounds.   Abdominal:      General: There is no distension.      Palpations: Abdomen is soft.      Tenderness: There is no abdominal tenderness.   Musculoskeletal:         General: No swelling or deformity.      Cervical back: Normal range of motion.   Skin:     General: Skin is warm and dry.   Neurological:      General: No focal deficit present.      Mental Status: She is alert and oriented to person, place, and time.         Results Review:  Results from last 7 days   Lab Units 12/12/24  0448   WBC 10*3/mm3 13.33*   HEMOGLOBIN g/dL 13.3   HEMATOCRIT % 39.1   PLATELETS 10*3/mm3 299     Results from last 7 days   Lab Units 12/12/24  0448   SODIUM mmol/L 139   POTASSIUM mmol/L 4.7   CHLORIDE mmol/L 106   CO2 mmol/L 23.2   BUN mg/dL 9   CREATININE mg/dL 0.84   CALCIUM mg/dL 8.6   BILIRUBIN mg/dL 0.2   ALK PHOS U/L 67   ALT (SGPT) U/L 21   AST (SGOT) U/L 34*   GLUCOSE mg/dL 104*       I reviewed the patient's new clinical results.        Obesity, Class III, BMI 40-49.9 (morbid obesity)    Essential hypertension    Hypertriglyceridemia      Assessment & Plan    Hypertension -blood pressure looks reasonably controlled.  Continue losartan as she takes at home.  Holding parameters are in place.  Basic chemistry panel looks okay today.    Plans noted for discharge from primary service today.  No other recommendations from my end and she can be discharged at any time.  I will sign off.  Thanks    I discussed the patients findings and my recommendations with patient and family    Thank you for allowing me to participate in the care of your patient.      Jamey Oliver MD  12/12/24  09:30 EST

## 2024-12-12 NOTE — PROGRESS NOTES
Case Management Discharge Note      Final Note: Discharged home. Cindy Starr RN         Selected Continued Care - Discharged on 12/12/2024 Admission date: 12/11/2024 - Discharge disposition: Home or Self Care       Transportation Services  Private: Car    Final Discharge Disposition Code: 01 - home or self-care

## 2024-12-12 NOTE — DISCHARGE INSTRUCTIONS
GOING HOME AFTER GASTRIC SLEEVE/ GASTRIC BYPASS SURGERY  [facility] Weight Loss: Post-Operative Information/Instructions  Shady Betancourt Jr., MD  General Patient Instructions for Discharge   - Call Surgeon's office at [phone] for follow-up appointment.    - Be sure you, the patient, have a follow-up appointment to be seen within seven (7) days after discharge. If not, please call [phone] to schedule an appointment. If you are discharged on a Saturday or Sunday, please call Monday to schedule the appointment.  - Contact the Surgeon at [phone] for any questions or concerns, including temperature greater than or equal to 101F, shortness of breath, leg swelling, redness at incision sites, nausea, vomiting, chills, or problems or questions.    - Follow the Gastric Stage 1 Diet    à Clear liquids, room temperature, sugar-free, caffeine-free, non-carbonated, 70 grams of protein, No Straws.  - You may shower. No tub bath for 2 weeks.  - No lifting, pushing, pulling, or tugging >25 pounds for 3 weeks.  - Ambulate every 3 hours while awake minimum for seven (7) days, increase distance daily.  - For the next several weeks, you are at an increased risk for blood clot formation. Therefore, you should walk regularly. You should not sit for prolonged periods of time, more than 45 minutes, without getting up and walking for 5-10 minutes. This includes any car rides, including the drive home from the hospital. If driving any distance greater than 30 miles over the next two (2) weeks, stop every 30-45 minutes and walk for 5-10 minutes each time.  - Continue using Incentive Spirometer and coughing exercises at least every two (2) hours while awake for one week.  - Continue use of CPAP/BIPAP for diagnosis of sleep apnea as directed.  - No driving or operating machinery allowed while taking narcotic (prescription) pain medication, and until you feel comfortable forcefully applying the brakes if needed. (This  usually takes more than 3 days.)    - Make an appointment with your Primary Care Physician within one week post-op to look at your home medications for possible changes or discontinuity.   Medications  - The nurse will provide a list of medications for you to continue at home   - If you received a Lovenox (Enoxaparin) or Apixiban (Eliquis) prescription at pre-op visit with Surgeon, start taking the medicine the morning after discharge unless directed otherwise.    - If you were prescribed Lovenox (Enoxaparin), review the education/teaching material/video with the nurse.    - Take post op pain meds as prescribed as needed.   - Continue Foltx until finished.   - Resume use of Actigall (Ursodiol) one (1) week after surgery if patient still has gallbladder. You should have been given a prescription at your pre-op visit. Contact the office if you do not have the prescription.   - Resume bariatric vitamin regimen as instructed in pre-op education with bariatric coordinator.    - Zegerid or Prilosec OTC (or generic) by mouth once daily for four (4) weeks unless you are already taking a proton pump inhibitor as home medication. Follow dosing instructions on package.   Nausea/Vomiting:  The following are possible causes for nausea/vomiting:  - Drinking too much or too fast.  - Sinus drainage/post nasal drip for allergy sufferers (you may take Sudafed, Claritin, Tylenol Sinus/Allergy, or other decongestants and nose sprays to help with this discomfort).  - Low blood sugar (sweating, shaky, irritable, weakness, dizzy or tunnel-vision) - treatment is to sip 100% fruit juice - no sugar added until symptoms subside.  - Acid in fruit juice - (may dilute with water or avoid).  - Eating or drinking something that is not on clear liquid (stage 1) diet.  Any nausea/vomiting that prohibits you from keeping fluids down for greater than 24 hours requires a call to the surgeon's office.  Urine:  Use your urine color as a guide to  determine if you are drinking enough fluid. The darker the urine, the more fluids you need to drink. Urine should be clear to light yellow if you are getting enough fluid. If you should experience frequency, burning or pain with urination, blood in urine, contact us or your primary care physician for possible UTI (urinary tract infection), which could require antibiotics (liquid preferred).  Bowel Movements:  You may not have a bowel movement for 2-5 days after going home. You may then experience liquid, runny or loose stools for approximately 3-4 weeks following surgery. This would require you to drink even more fluids to prevent dehydration. Some patients may experience constipation, which can be treated with increased fluids, drinking warm liquids, increased activity and the use of a Fleets Enema, Milk of Magnesia, or suppositories. The first couple of bowel movements could be bloody, tarry black or dark maroon in color. This is OK as long as the stool returns to a normal color in 1-2 days. If however, you have frequent or a large amount of bloody or tarry black stools and/or become light-headed or dizzy, you may be bleeding and require urgent attention. Please call us right away.  Abdominal Incisions:  You will have small incisions. Do not scrub incisions, but allow the warm, soapy water to run over the incisions, rinse well, and pat dry. You may use any brand of anti-bacterial soap. Do not use Peroxide or Neosporin type ointments on sites, unless instructed to do so by a surgeon or nurse. Monitor daily for signs/symptoms of infection, which might include: drainage with a foul odor, pain, redness, swelling or heat at the incision sight; fever, body aches and chills. If you suspect infection or have a fever, give us a call.  Pain:  You will be given a prescription for pain medication to control your pain. If you feel the dose is too strong, you may take half the ordered dose, or you may take Tylenol adult liquid  per package instructions for minor pain. Do not take any medications that contain aspirin or aspirin products.  Do not take medications like: Motrin, Aleve, Ibuprofen, Advil, Naproxen, Celebrex, Daypro, Bextra, Meloxicam or other medications commonly used for arthritis or joint pain.  No steroids or cortisone injections. There may be pain, which should improve every few days. Pain should not suddenly get worse or more intense. Pain that suddenly changes and is constant and severe should be called in to the surgeon's office. Any sudden pain in the lower extremities with associated warmth and redness should be called in to the surgeon's office immediately. Do not rub or massage this area, as it could be a blood clot.  Diet:  Remain on the clear liquid diet (stage 1) per your  which includes 70 grams of protein each day, sugar free, non carbonated and no straws. Day 1 is the day of surgery. If you are tolerating the stage 1 diet, you may then proceed to stage 2 diet, as instructed in the . Do not progress to the stage 2 diet if you are having nausea/vomiting. Refer to the Basic Nutrition and Food Principles guide.  Medications:  The nurse will let you know which medications you will need to continue once you go home. Do not take any medications that are extended or time released if you had the gastric bypass procedure, OK to take if you had the gastric sleeve procedure. Large capsules can be opened and diluted with clear liquids. Check with your physician or pharmacist as to which pills may be crushed and which capsules may be opened and diluted safely. Continue taking Foltx as surgeon orders. If you still have your gall bladder and were prescribed Actigall (Ursodiol), you may resume this medication one week after your surgery. You will remain on Actigall (Ursodiol) for approximately 6 months. The dose is 1 pill, 2 times each day for 6 months.  Activity:  Continue your deep breathing and  coughing exercises with your Incentive Spirometer breathing device at least every 3 hours while awake (10 repetitions each time) for one week. May use CPAP. This will help to prevent respiratory problems such as pneumonia. No lifting, pulling or tugging anything over 25 pounds for 3 weeks after surgery. You may shower but no tub baths, hot tubs or swimming for 2 weeks. Moderate walking is recommended every 3 hours while awake minimum, increase distance daily. Further exercise will be discussed at the first post-op visit. No driving or operating machinery allow until off narcotic pain medication and until you feel comfortable forcefully applying the brakes (usually takes 3 or more days). For the next few weeks you are at an increased risk for blood clot formation. Therefore you should walk regularly and you should not sit for prolonged periods of time, more than 45 minutes without getting up and walking for 5-10 minutes. This includes car rides. Including riding home from the hospital. If riding a distance greater than 30 miles over the next 2 weeks stop every 30-45 minutes and walk 5-10 mintues each time. No tanning bed use for 8 weeks after surgery and in general, not recommended due to the increased risk for skin cancer. Incisions will burn/blister very badly with tanning bed use.  Illness:  Your primary care physician should treat general illness such as ear infections, sinus infections, and viral type illnesses, etc. Medications prescribed should be liquid/elixir form when possible, for the first 30 days.  General:  In general, it is recommended that you weigh yourself no more than once per week. Let the weight come off you and concentrate on more important things. Remember the weight was not gained overnight, nor will it be lost overnight. Gastric Bypass/ Gastric Sleeve weight loss will continue over a period of 12-18 months. Do not  yourself according to how others are doing after surgery, as this will  cause unnecessary discouragement.  THE ABOVE ARE GENERAL GUIDELINES TO ASSIST YOU ONCE HOME, IF YOU ARE IN DOUBT, OR YOU HAVE ANY QUESTIONS, CALL US AT THE NUMBERS LISTED BELOW.  IN THE EVENT OF SUDDEN CHEST PAIN, SHORTNESS OF BREATH, OR ANY LIFE THREATENING CONDITION, CALL 911.  Any time you are evaluated or admitted to another facility, please have someone notify the surgeon's office.  Supplements:  70 grams of protein taken EVERY DAY. Remember to drink at least 64 ounces of fluid a day, sipping slowly early on. Increase this amount during the summertime. Sipping slowly will not stretch your new stomach. Drinking too fast or gulping liquids will cause brief discomfort and early could cause staple line disruption (leak). With eating, tiny bites, then chew, chew, chew, and swallow. Lay your fork/spoon down for 2-3 minutes, and then take your next bite. Your pouch will tell you within 1-2 bites if it is going to tolerate what you are eating.   Protein Vendors:  Refer to protein vendors' handout from consult class. You can always find protein drinks at the bariatric office, grocery stores, [employer], drug [employer], [employer], health food stores, and on the Internet. Find one high in protein (15-30 grams per serving) and low carb (less than 18 grams per serving).  Now is a great time to re-read your . Please review specific instructions given to you at discharge by your physician (surgeon).  HOW/WHEN TO CONTACT US:  It is imperative that you contact us with any of the following:    Ÿ fever greater than 101 degrees  Ÿ shortness of breath  Ÿ leg swelling  Ÿ body aches  Ÿ shaking chills  Ÿ nausea and vomitting  Ÿ pain that has worsened  Ÿ redness at incision sites  Ÿ pus or foul smelling drainage from an incision or wound  Ÿ inability to keep fluids down for more than a day  Ÿ any other condition you feel needs our attention.  [facility] - Bariatric: [phone] call this number anytime 24 hours a  day / 7 days a week.  Teach-back Questions to be answered by the patient prior to discharge.   What complications would prompt you to call your doctor when you return home? _________________    What is the purpose of your prescribed medication? ________________  What are some potential side effects of the medications you will be taking at home? _______________

## 2024-12-12 NOTE — DISCHARGE SUMMARY
"Discharge Summary    Patient name: Lizette Connolly    Medical record number: [id number]    Admission date: 12/11/2024  Discharge date:      Attending physician: Dr. 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 Patient denies shortness of air and lower extremity pain. Feels better than yesterday. No vomiting this am. Ambulating well and using incentive spirometer.          /84 (BP Location: Right arm, Patient Position: Lying)   Pulse 52   Temp 98 °F (36.7 °C) (Oral)   Resp 16   Ht 170.2 cm (67\")   Wt 114 kg (251 lb 1.7 oz)   SpO2 100%   BMI 39.33 kg/m²     General:  alert, appears stated age, cooperative, and no distress   Abdomen: soft, bowel sounds active, appropriate tenderness   Incision:   healing well, no drainage, no erythema, no hernia, no seroma, no swelling, no dehiscence, incision well approximated   Heart: Regular rate   Lungs: Clear to auscultation bilaterally     I reviewed the patient's new clinical " results.     Lab Results (last 24 hours)       Procedure Component Value Units Date/Time    Comprehensive Metabolic Panel [[id number]]  (Abnormal) Collected: 12/12/24 0448    Specimen: Blood Updated: 12/12/24 0557     Glucose 104 mg/dL      BUN 9 mg/dL      Creatinine 0.84 mg/dL      Sodium 139 mmol/L      Potassium 4.7 mmol/L      Comment: Slight hemolysis detected by analyzer. Result may be falsely elevated.        Chloride 106 mmol/L      CO2 23.2 mmol/L      Calcium 8.6 mg/dL      Total Protein 6.0 g/dL      Albumin 3.1 g/dL      ALT (SGPT) 21 U/L      AST (SGOT) 34 U/L      Comment: Slight hemolysis detected by analyzer. Result may be falsely elevated.        Alkaline Phosphatase 67 U/L      Total Bilirubin 0.2 mg/dL      Globulin 2.9 gm/dL      A/G Ratio 1.1 g/dL      BUN/Creatinine Ratio 10.7     Anion Gap 9.8 mmol/L      eGFR 88.6 mL/min/1.73     Narrative:      GFR Categories in Chronic Kidney Disease (CKD)      GFR Category          GFR (mL/min/1.73)    Interpretation  G1                     90 or greater         Normal or high (1)  G2                      60-89                Mild decrease (1)  G3a                   45-59                Mild to moderate decrease  G3b                   30-44                Moderate to severe decrease  G4                    15-29                Severe decrease  G5                    14 or less           Kidney failure          (1)In the absence of evidence of kidney disease, neither GFR category G1 or G2 fulfill the criteria for CKD.    eGFR calculation 2021 CKD-EPI creatinine equation, which does not include race as a factor    Magnesium [[id number]]  (Normal) Collected: 12/12/24 0448    Specimen: Blood Updated: 12/12/24 0557     Magnesium 2.0 mg/dL     Phosphorus [[id number]]  (Normal) Collected: 12/12/24 0448    Specimen: Blood Updated: 12/12/24 0552     Phosphorus 4.2 mg/dL     CBC & Differential [[id number]]  (Abnormal) Collected: 12/12/24 0448    Specimen: Blood Updated:  12/12/24 0530    Narrative:      The following orders were created for panel order CBC & Differential.  Procedure                               Abnormality         Status                     ---------                               -----------         ------                     CBC Auto Differential[[id number]]        Abnormal            Final result                 Please view results for these tests on the individual orders.    CBC Auto Differential [[id number]]  (Abnormal) Collected: 12/12/24 0448    Specimen: Blood Updated: 12/12/24 0530     WBC 13.33 10*3/mm3      RBC 4.31 10*6/mm3      Hemoglobin 13.3 g/dL      Hematocrit 39.1 %      MCV 90.7 fL      MCH 30.9 pg      MCHC 34.0 g/dL      RDW 12.0 %      RDW-SD 40.4 fl      MPV 10.8 fL      Platelets 299 10*3/mm3      Neutrophil % 80.0 %      Lymphocyte % 13.1 %      Monocyte % 6.2 %      Eosinophil % 0.0 %      Basophil % 0.2 %      Immature Grans % 0.5 %      Neutrophils, Absolute 10.65 10*3/mm3      Lymphocytes, Absolute 1.75 10*3/mm3      Monocytes, Absolute 0.83 10*3/mm3      Eosinophils, Absolute 0.00 10*3/mm3      Basophils, Absolute 0.03 10*3/mm3      Immature Grans, Absolute 0.07 10*3/mm3      nRBC 0.0 /100 WBC                Assessment:      Doing well postoperatively.      Plan:   1. Continue Stage 1 diet  2. Continue with ambulation and Incentive spirometry  3. Plan for discharge home    Patient was seen and examined by Dr. Betancourt    [facility] Course: The patient is a very pleasant 44 y.o. female that was admitted to the hospital with morbid obesity with co-morbidities. Patient underwent robotic RNY Gastric bypass conversion from sleeve gastrectomy with paraesophageal hernia repair and lysis of adhesions (see OP note) without complication. The patient was then admitted to the bariatric unit per protocol where they remained stable. POD #1 she was started on a stage 1 bariatric diet which she tolerated so she was able to be discharged home in good  condition.      Discharge medications:      Discharge Medications        New Medications        Instructions Start Date   ondansetron ODT 4 MG disintegrating tablet  Commonly known as: ZOFRAN-ODT   4 mg, Translingual, Every 4 Hours PRN      sucralfate 1 g tablet  Commonly known as: Carafate   1 g, Oral, 3 times daily      traMADol 50 MG tablet  Commonly known as: Ultram   50 mg, Oral, Every 6 Hours PRN             Continue These Medications        Instructions Start Date   Ajovy 225 MG/1.5ML solution auto-injector  Generic drug: Fremanezumab-vfrm   225 mg, Every 30 Days      albuterol sulfate  (90 Base) MCG/ACT inhaler  Commonly known as: PROVENTIL HFA;VENTOLIN HFA;PROAIR HFA       colestipol 1 g tablet  Commonly known as: COLESTID   1 g, Oral, Daily      Enoxaparin Sodium 40 MG/0.4ML solution prefilled syringe syringe  Commonly known as: LOVENOX   40 mg, Subcutaneous, Every 12 Hours Scheduled, Start after surgery unless instructed otherwise      FLUoxetine 20 MG capsule  Commonly known as: PROzac   3 capsules, Daily      fluticasone 50 MCG/ACT nasal spray  Commonly known as: FLONASE       folic acid-vit B6-vit B12 2.5-25-1 MG tablet tablet  Commonly known as: FOLBEE   1 tablet, Oral, Daily      gabapentin 100 MG capsule  Commonly known as: NEURONTIN   1 capsule, 3 Times Daily      hydrOXYzine 25 MG tablet  Commonly known as: ATARAX   25 mg, As Needed      hyoscyamine 0.125 MG SL tablet  Commonly known as: LEVSIN   DISSOLVE 1 TABLET UNDER THE TONGUE EVERY 4 HOURS AS NEEDED FOR SPASMS      levonorgestrel 20 MCG/24HR IUD  Commonly known as: MIRENA   1 each, Once      linaclotide 145 MCG capsule capsule  Commonly known as: Linzess   145 mcg, Oral, Every Morning Before Breakfast      losartan 100 MG tablet  Commonly known as: COZAAR   100 mg, Daily      Magnesium Oxide -Mg Supplement 400 (240 Mg) MG tablet   Daily      montelukast 10 MG tablet  Commonly known as: SINGULAIR   10 mg, Daily      pantoprazole 20 MG  EC tablet  Commonly known as: PROTONIX   20 mg, Oral, 2 Times Daily      SUMAtriptan 100 MG tablet  Commonly known as: IMITREX   TAKE 1 TABLET BY MOUTH AT ONSET OF HEADACHE; MAY REPEAT 1 TABLET IN 2 HOURS IF NEEDED. MAX 2 TABLETS IN 24 HOURS      SUMAtriptan Succinate 6 MG/0.5ML injection  Commonly known as: IMITREX       ubrogepant 100 MG tablet  Commonly known as: UBRELVY   100 mg, As Needed             Stop These Medications      fish oil 1000 MG capsule capsule     ondansetron 8 MG tablet  Commonly known as: ZOFRAN              Discharge instructions:  Per Bariatric manual; per our protocol      Follow-up appointment: Follow up with in the office as scheduled.  If not already scheduled call for appointment at [phone].

## 2024-12-12 NOTE — PROGRESS NOTES
Continued Stay Note  [facility]     Patient Name: Lizette Connolly  MRN: [id number]  Today's Date: 12/12/2024    Admit Date: 12/11/2024    Plan: Home no needs   Discharge Plan       Row Name 12/12/24 1016       Plan    Plan Home no needs    Plan Comments Discharge order noted. Met with patient who confirmed DC plan is to return home. Stated her spouse will assist as needed and will provide transportation at DC. Denies any needs/equipment.                   Discharge Codes    No documentation.                 Expected Discharge Date and Time       Expected Discharge Date Expected Discharge Time    Dec 12, 2024               Cindy Starr RN

## 2024-12-12 NOTE — NURSING NOTE
Pt and  present during discharge teaching.  demonstrated lovenox. Pt received her medications from [facility] pharmacy. All belongings with pt. Pt has follow up appointment. Pt ready to go home.

## 2024-12-12 NOTE — PROGRESS NOTES
Case Management Discharge Note                Selected Continued Care - Discharged on 12/12/2024 Admission date: 12/11/2024 - Discharge disposition: Home or Self Care         Transportation Services  Private: Car    Final Discharge Disposition Code: 01 - home or self-care

## 2024-12-12 NOTE — PLAN OF CARE
Goal Outcome Evaluation:  Plan of Care Reviewed With: patient, spouse        Progress: no change  Outcome Evaluation: Elevated BP tonight, PRN meds given, [facility] consulted, PO and IV pain meds given, Levsin given for gas pain, ambulated in halls, encouraged use of IS, morning labs ordered, ivf infusing, accumax and scds on, no c/o of nausea, voiding freely, lap sites cdi, spouse at bedside

## 2024-12-16 ENCOUNTER — OFFICE VISIT (OUTPATIENT)
Dept: BARIATRICS/WEIGHT MGMT | Facility: CLINIC | Age: 44
End: 2024-12-16
Payer: COMMERCIAL

## 2024-12-16 VITALS
HEART RATE: 74 BPM | HEIGHT: 66 IN | WEIGHT: 246 LBS | SYSTOLIC BLOOD PRESSURE: 139 MMHG | TEMPERATURE: 98.2 F | DIASTOLIC BLOOD PRESSURE: 90 MMHG | BODY MASS INDEX: 39.53 KG/M2

## 2024-12-16 DIAGNOSIS — Z98.84 S/P GASTRIC BYPASS: ICD-10-CM

## 2024-12-16 DIAGNOSIS — E66.01 OBESITY, CLASS III, BMI 40-49.9 (MORBID OBESITY): Primary | ICD-10-CM

## 2024-12-16 DIAGNOSIS — I10 ESSENTIAL HYPERTENSION: ICD-10-CM

## 2024-12-16 PROCEDURE — 99024 POSTOP FOLLOW-UP VISIT: CPT | Performed by: PHYSICIAN ASSISTANT

## 2024-12-16 RX ORDER — TRAMADOL HYDROCHLORIDE 50 MG/1
50 TABLET ORAL EVERY 6 HOURS PRN
Qty: 5 TABLET | Refills: 0 | Status: SHIPPED | OUTPATIENT
Start: 2024-12-16

## 2024-12-16 NOTE — PROGRESS NOTES
[facility] BARIATRIC SURGERY  [address]  [facility] [id number] [phone]  [address]  [facility] [id number] [phone]  Dept: [phone]  12/16/2024      Lizette Connolly.  [id number]  [id number]  1980  female      Chief Complaint   Patient presents with    Post-op     6 day post op RNY       BH Post-Op Bariatric Surgery:   Lizette Connolly is status post laparopscopic Laparoscopic RNY Bypass Revision from Sleeve with PEHR procedure, performed on 12/11/2024.     HPI:   Today's weight is 112 kg (246 lb) pounds, today's BMI is Body mass index is 40.15 kg/m²., she   has a  loss of 13 pounds since the last visit and her    weight loss since surgery is 13 pounds. The patient reports a decreased portion size and loss of appetite.      Lizette Connolly denies n/v/c/d/regurg/reflux and reports fatigue. Pt reports normal bm. Pt states her nausea and reflux is gone.  The patient c/o appropriate post-op incisional discomfort that is improving.     The patient states they are drinking 50oz. Fluids and 45g protein.   Taking their vitamins, walking and using IS. Denies fevers, chills, chest pain or shortness of air.      Diet and Exercise: Diet history reviewed and discussed with the patient. Weight loss/gains to date discussed with the patient. No carbonated beverage consumption and exercising regularly- walking frequently.   Supplements: multivitamins, B-12, calcium, iron, B-1 and Vitamin D.   Patient is taking blood thinner as prescribed: Lovenox  Current outpatient and discharge medications have been reconciled for the patient.  Reviewed by: Danielle Strong PA-C        Review of Systems   Constitutional:  Positive for fatigue. Negative for appetite change, fever and unexpected weight change.   HENT: Negative.     Eyes: Negative.    Respiratory: Negative.     Cardiovascular: Negative.  Negative for leg swelling.   Gastrointestinal:   Positive for abdominal pain. Negative for abdominal distention, constipation, diarrhea, nausea and vomiting.   Genitourinary:  Negative for difficulty urinating, frequency and urgency.   Musculoskeletal:  Negative for back pain.   Skin:  Positive for wound.   Psychiatric/Behavioral: Negative.     All other systems reviewed and are negative.      Patient Active Problem List   Diagnosis    Chronic fatigue    GERD (gastroesophageal reflux disease)    Anxiety and depression    Obesity, Class III, BMI 40-49.9 (morbid obesity)    Essential hypertension    Vitamin D deficiency    PCOS (polycystic ovarian syndrome)    Hypertriglyceridemia    Migraine    Dietary counseling    Snoring    S/P gastric bypass    Nausea    Dysphagia    Abnormal CT scan, colon    Abnormal head CT    Vasospasm of cerebral artery    Paraesophageal hernia       The following portions of the patient's history were reviewed and updated as appropriate: allergies, current medications, past medical history, past surgical history, and problem list.    Vitals:    12/16/24 1157   BP: 139/90   Pulse: 74   Temp: 98.2 °F (36.8 °C)       Physical Exam  Vitals reviewed.   Constitutional:       General: She is awake. She is not in acute distress.     Appearance: She is morbidly obese.   HENT:      Head: Normocephalic and atraumatic.      Mouth/Throat:      Mouth: Mucous membranes are moist.   Eyes:      General: No scleral icterus.     Pupils: Pupils are equal, round, and reactive to light.   Cardiovascular:      Rate and Rhythm: Normal rate and regular rhythm.   Pulmonary:      Effort: Pulmonary effort is normal. No respiratory distress.      Breath sounds: Normal breath sounds.   Abdominal:      General: Abdomen is flat. Bowel sounds are normal. There is no distension.      Palpations: Abdomen is soft.      Tenderness: There is no abdominal tenderness. There is no guarding.      Comments: Incisions clean, dry, intact; no erythema   Musculoskeletal:          General: Normal range of motion.      Cervical back: Normal range of motion and neck supple.   Skin:     General: Skin is warm and dry.   Neurological:      General: No focal deficit present.      Mental Status: She is alert and oriented to person, place, and time.   Psychiatric:         Mood and Affect: Mood normal.         Behavior: Behavior normal. Behavior is cooperative.         Assessment:   Post-op, the patient is improving as expected.     Encounter Diagnoses   Name Primary?    Obesity, Class III, BMI 40-49.9 (morbid obesity) Yes    S/P gastric bypass     Essential hypertension        Plan:   Continue increasing both fluids and protein intake.  Will send in small refill of Ultram. If pain not improving during this week, call office and come in for appt.    Reviewed with patient the importance of following the manual for diet progression. Increase activity as tolerated. Continue increasing daily intake of protein and water.   Return to work: the patient is to return to 3 weeks from their surgery date with no restrictions unless they develop medical problems in which we will see them back in the office. They received a note in our office today with their return to work date.  Activity restrictions: no lifting, pushing or pulling over 25lbs for 3 weeks.   Recommended patient be sure to get at least 70 grams of protein per day. Discussed with the patient the recommended amount of water per day to intake. Reviewed vitamin requirements. Be sure to do routine exercise and increase activity as tolerated. No asa, nsaids or steroids for 8 weeks if gastric sleeve procedure and lifelong if gastric bypass procedure.. Patient may use miralax as needed if necessary.     Instructions / Recommendations: dietary counseling recommended, recommended a daily protein intake of  grams, vitamin supplement(s) recommended, recommended exercising consistently throughout the week, behavior modifications recommended and instructed  to call the office for concerns, questions, or problems.     The patient was instructed to follow up at one month follow up appt.     The patient was counseled regarding post op bariatric manual

## 2025-01-01 DIAGNOSIS — K21.9 GASTROESOPHAGEAL REFLUX DISEASE WITHOUT ESOPHAGITIS: ICD-10-CM

## 2025-01-02 RX ORDER — PANTOPRAZOLE SODIUM 20 MG/1
20 TABLET, DELAYED RELEASE ORAL 2 TIMES DAILY
Qty: 60 TABLET | Refills: 1 | Status: SHIPPED | OUTPATIENT
Start: 2025-01-02

## 2025-01-08 RX ORDER — CYANOCOBALAMIN/FOLIC AC/VIT B6 1-2.5-25MG
1 TABLET ORAL DAILY
Qty: 40 TABLET | Refills: 0 | OUTPATIENT
Start: 2025-01-08

## 2025-01-24 ENCOUNTER — LAB (OUTPATIENT)
Dept: LAB | Facility: HOSPITAL | Age: 45
End: 2025-01-24
Payer: COMMERCIAL

## 2025-01-24 ENCOUNTER — OFFICE VISIT (OUTPATIENT)
Dept: BARIATRICS/WEIGHT MGMT | Facility: CLINIC | Age: 45
End: 2025-01-24
Payer: COMMERCIAL

## 2025-01-24 VITALS
DIASTOLIC BLOOD PRESSURE: 89 MMHG | TEMPERATURE: 98.1 F | HEART RATE: 61 BPM | BODY MASS INDEX: 36.96 KG/M2 | HEIGHT: 66 IN | SYSTOLIC BLOOD PRESSURE: 142 MMHG | WEIGHT: 230 LBS

## 2025-01-24 DIAGNOSIS — E66.812 OBESITY, CLASS II, BMI 35-39.9: ICD-10-CM

## 2025-01-24 DIAGNOSIS — Z98.84 S/P GASTRIC BYPASS: ICD-10-CM

## 2025-01-24 DIAGNOSIS — E66.812 OBESITY, CLASS II, BMI 35-39.9: Primary | ICD-10-CM

## 2025-01-24 DIAGNOSIS — I10 ESSENTIAL HYPERTENSION: ICD-10-CM

## 2025-01-24 PROBLEM — E66.813 OBESITY, CLASS III, BMI 40-49.9 (MORBID OBESITY): Status: RESOLVED | Noted: 2019-01-09 | Resolved: 2025-01-24

## 2025-01-24 PROBLEM — E66.01 OBESITY, CLASS III, BMI 40-49.9 (MORBID OBESITY): Status: RESOLVED | Noted: 2019-01-09 | Resolved: 2025-01-24

## 2025-01-24 LAB
ALBUMIN SERPL-MCNC: 3.4 G/DL (ref 3.5–5.2)
ALBUMIN/GLOB SERPL: 1.3 G/DL
ALP SERPL-CCNC: 90 U/L (ref 39–117)
ALT SERPL W P-5'-P-CCNC: 19 U/L (ref 1–33)
ANION GAP SERPL CALCULATED.3IONS-SCNC: 8.7 MMOL/L (ref 5–15)
AST SERPL-CCNC: 20 U/L (ref 1–32)
BASOPHILS # BLD AUTO: 0.04 10*3/MM3 (ref 0–0.2)
BASOPHILS NFR BLD AUTO: 0.6 % (ref 0–1.5)
BILIRUB SERPL-MCNC: 0.4 MG/DL (ref 0–1.2)
BUN SERPL-MCNC: 10 MG/DL (ref 6–20)
BUN/CREAT SERPL: 12 (ref 7–25)
CALCIUM SPEC-SCNC: 8.8 MG/DL (ref 8.6–10.5)
CHLORIDE SERPL-SCNC: 106 MMOL/L (ref 98–107)
CO2 SERPL-SCNC: 27.3 MMOL/L (ref 22–29)
CREAT SERPL-MCNC: 0.83 MG/DL (ref 0.57–1)
DEPRECATED RDW RBC AUTO: 46.9 FL (ref 37–54)
EGFRCR SERPLBLD CKD-EPI 2021: 89.8 ML/MIN/1.73
EOSINOPHIL # BLD AUTO: 0.21 10*3/MM3 (ref 0–0.4)
EOSINOPHIL NFR BLD AUTO: 3.4 % (ref 0.3–6.2)
ERYTHROCYTE [DISTWIDTH] IN BLOOD BY AUTOMATED COUNT: 13.3 % (ref 12.3–15.4)
FERRITIN SERPL-MCNC: 90.5 NG/ML (ref 13–150)
FOLATE SERPL-MCNC: >20 NG/ML (ref 4.78–24.2)
GLOBULIN UR ELPH-MCNC: 2.7 GM/DL
GLUCOSE SERPL-MCNC: 85 MG/DL (ref 65–99)
HCT VFR BLD AUTO: 43.5 % (ref 34–46.6)
HGB BLD-MCNC: 13.2 G/DL (ref 12–15.9)
IMM GRANULOCYTES # BLD AUTO: 0.03 10*3/MM3 (ref 0–0.05)
IMM GRANULOCYTES NFR BLD AUTO: 0.5 % (ref 0–0.5)
IRON 24H UR-MRATE: 95 MCG/DL (ref 37–145)
LYMPHOCYTES # BLD AUTO: 2.02 10*3/MM3 (ref 0.7–3.1)
LYMPHOCYTES NFR BLD AUTO: 32.6 % (ref 19.6–45.3)
MAGNESIUM SERPL-MCNC: 1.8 MG/DL (ref 1.6–2.6)
MCH RBC QN AUTO: 28.8 PG (ref 26.6–33)
MCHC RBC AUTO-ENTMCNC: 30.3 G/DL (ref 31.5–35.7)
MCV RBC AUTO: 95 FL (ref 79–97)
MONOCYTES # BLD AUTO: 0.55 10*3/MM3 (ref 0.1–0.9)
MONOCYTES NFR BLD AUTO: 8.9 % (ref 5–12)
NEUTROPHILS NFR BLD AUTO: 3.34 10*3/MM3 (ref 1.7–7)
NEUTROPHILS NFR BLD AUTO: 54 % (ref 42.7–76)
NRBC BLD AUTO-RTO: 0 /100 WBC (ref 0–0.2)
PHOSPHATE SERPL-MCNC: 4 MG/DL (ref 2.5–4.5)
PLATELET # BLD AUTO: 313 10*3/MM3 (ref 140–450)
PMV BLD AUTO: 10.9 FL (ref 6–12)
POTASSIUM SERPL-SCNC: 4.6 MMOL/L (ref 3.5–5.2)
PREALB SERPL-MCNC: 17.1 MG/DL (ref 20–40)
PROT SERPL-MCNC: 6.1 G/DL (ref 6–8.5)
RBC # BLD AUTO: 4.58 10*6/MM3 (ref 3.77–5.28)
SODIUM SERPL-SCNC: 142 MMOL/L (ref 136–145)
WBC NRBC COR # BLD AUTO: 6.19 10*3/MM3 (ref 3.4–10.8)

## 2025-01-24 PROCEDURE — 84425 ASSAY OF VITAMIN B-1: CPT

## 2025-01-24 PROCEDURE — 83540 ASSAY OF IRON: CPT

## 2025-01-24 PROCEDURE — 83735 ASSAY OF MAGNESIUM: CPT

## 2025-01-24 PROCEDURE — 84134 ASSAY OF PREALBUMIN: CPT

## 2025-01-24 PROCEDURE — 80053 COMPREHEN METABOLIC PANEL: CPT

## 2025-01-24 PROCEDURE — 82728 ASSAY OF FERRITIN: CPT

## 2025-01-24 PROCEDURE — 84100 ASSAY OF PHOSPHORUS: CPT

## 2025-01-24 PROCEDURE — 82746 ASSAY OF FOLIC ACID SERUM: CPT

## 2025-01-24 PROCEDURE — 99024 POSTOP FOLLOW-UP VISIT: CPT | Performed by: PHYSICIAN ASSISTANT

## 2025-01-24 PROCEDURE — 83921 ORGANIC ACID SINGLE QUANT: CPT

## 2025-01-24 PROCEDURE — 85025 COMPLETE CBC W/AUTO DIFF WBC: CPT

## 2025-01-24 PROCEDURE — 84590 ASSAY OF VITAMIN A: CPT

## 2025-01-24 PROCEDURE — 36415 COLL VENOUS BLD VENIPUNCTURE: CPT

## 2025-01-24 RX ORDER — CYANOCOBALAMIN 1000 UG/ML
1000 INJECTION, SOLUTION INTRAMUSCULAR; SUBCUTANEOUS
Qty: 6 ML | Refills: 1 | Status: SHIPPED | OUTPATIENT
Start: 2025-01-24

## 2025-01-24 RX ORDER — SYRINGE W-NEEDLE,DISPOSAB,3 ML 25GX5/8"
6 SYRINGE, EMPTY DISPOSABLE MISCELLANEOUS
Qty: 6 EACH | Refills: 2 | Status: SHIPPED | OUTPATIENT
Start: 2025-01-24

## 2025-01-24 RX ORDER — ONDANSETRON 4 MG/1
4 TABLET, ORALLY DISINTEGRATING ORAL EVERY 4 HOURS PRN
Qty: 20 TABLET | Refills: 0 | Status: SHIPPED | OUTPATIENT
Start: 2025-01-24

## 2025-01-24 RX ORDER — TIZANIDINE 2 MG/1
2-4 TABLET ORAL
COMMUNITY
Start: 2024-12-17 | End: 2025-12-17

## 2025-01-24 NOTE — PROGRESS NOTES
[facility] BARIATRIC SURGERY  [address]  [facility] [id number] [phone]  [address]  [facility] [id number] [phone]  Dept: [phone]  1/24/2025      Lizette Connolly.  [id number]  [id number]  1980  female      Chief Complaint   Patient presents with    Post-op     1 mo po RNY       BH Post-Op Bariatric Surgery:   Lizette Connolly is status post Laparoscopic RNY Bypass Revision from Sleeve with PEHR procedure, performed on 12/11/2024     HPI:       Today's weight is 104 kg (230 lb) pounds, today's BMI is Body mass index is 37.54 kg/m²., she has a loss of 16 pounds since the last visit and her  weight loss since surgery is 29 pounds. The patient reports a decreased portion size and loss of appetite.    Lizette Connolly denies n/v/d/regurg/reflux and reports constipation and fatigue. Pt reports nausea resolved since surgery. Pt taking SL b12 but not noticing a difference in energy levels.     Diet and Exercise: Diet history reviewed and discussed with the patient. Weight loss/gains to date discussed with the patient. The patient states they are eating 40-50 grams of protein per day. She reports eating 3 meals per day, a typical portion size of 1 cup, eating 2 snacks per day, drinking 5 or more 8-oz. glasses of water per day, no carbonated beverage consumption and exercising regularly- cardio and weights 2-3x/wk.     Supplements: bariatric multi, calcium, SL b12.     Review of Systems   Constitutional:  Positive for appetite change and fatigue. Negative for unexpected weight change.   HENT: Negative.     Eyes: Negative.    Respiratory: Negative.     Cardiovascular: Negative.  Negative for leg swelling.   Gastrointestinal:  Positive for constipation. Negative for abdominal distention, abdominal pain, diarrhea, nausea and vomiting.   Genitourinary:  Negative for difficulty urinating, frequency and urgency.   Musculoskeletal:   Negative for back pain.   Skin: Negative.    Psychiatric/Behavioral: Negative.     All other systems reviewed and are negative.      Patient Active Problem List   Diagnosis    Chronic fatigue    GERD (gastroesophageal reflux disease)    Anxiety and depression    Essential hypertension    Vitamin D deficiency    PCOS (polycystic ovarian syndrome)    Hypertriglyceridemia    Migraine    Dietary counseling    Snoring    S/P gastric bypass    Obesity, Class II, BMI 35-39.9    Nausea    Dysphagia    Abnormal CT scan, colon    Abnormal head CT    Vasospasm of cerebral artery    Paraesophageal hernia       Past Medical History:   Diagnosis Date    Anxiety and depression     Chronic nausea     S/P GASTRIC SLEEVE    GERD (gastroesophageal reflux disease)     Hip pain 01/07/2019    RIGHT    HPV in female     Hypertension     Kidney stones 01/07/2019    Lactose intolerance May 2020    After gadtric sleeve    Migraine     PCOS (polycystic ovarian syndrome)     Sleep apnea     GONE    Vasospasm        The following portions of the patient's history were reviewed and updated as appropriate: allergies, current medications, past medical history, past surgical history, and problem list.    Vitals:    01/24/25 1002   BP: 142/89   Pulse: 61   Temp: 98.1 °F (36.7 °C)       Physical Exam  Vitals reviewed.   Constitutional:       Appearance: Normal appearance.   HENT:      Head: Normocephalic and atraumatic.   Eyes:      Pupils: Pupils are equal, round, and reactive to light.   Cardiovascular:      Rate and Rhythm: Normal rate.   Pulmonary:      Effort: Pulmonary effort is normal. No respiratory distress.   Musculoskeletal:         General: Normal range of motion.      Cervical back: Normal range of motion and neck supple.   Neurological:      General: No focal deficit present.      Mental Status: She is alert and oriented to person, place, and time.   Psychiatric:         Mood and Affect: Mood normal.         Behavior: Behavior normal.  "        Assessment:   Post-op, the patient is doing well.     Encounter Diagnoses   Name Primary?    Obesity, Class II, BMI 35-39.9 Yes    S/P gastric bypass     Essential hypertension        Plan:   Diagnoses and all orders for this visit:    1. Obesity, Class II, BMI 35-39.9 (Primary)  -     CBC & Differential; Future  -     Comprehensive Metabolic Panel; Future  -     Ferritin; Future  -     Folate; Future  -     Iron; Future  -     Magnesium; Future  -     Methylmalonic Acid, Serum; Future  -     Phosphorus; Future  -     Prealbumin; Future  -     Vitamin A; Future  -     Vitamin B1, Whole Blood; Future    2. S/P gastric bypass  -     CBC & Differential; Future  -     Comprehensive Metabolic Panel; Future  -     Ferritin; Future  -     Folate; Future  -     Iron; Future  -     Magnesium; Future  -     Methylmalonic Acid, Serum; Future  -     Phosphorus; Future  -     Prealbumin; Future  -     Vitamin A; Future  -     Vitamin B1, Whole Blood; Future    3. Essential hypertension  -     CBC & Differential; Future  -     Comprehensive Metabolic Panel; Future  -     Ferritin; Future  -     Folate; Future  -     Iron; Future  -     Magnesium; Future  -     Methylmalonic Acid, Serum; Future  -     Phosphorus; Future  -     Prealbumin; Future  -     Vitamin A; Future  -     Vitamin B1, Whole Blood; Future    Other orders  -     ondansetron ODT (ZOFRAN-ODT) 4 MG disintegrating tablet; Place 1 tablet on the tongue Every 4 (Four) Hours As Needed for Nausea or Vomiting.  Dispense: 20 tablet; Refill: 0  -     Syringe 22G X 1\" 3 ML misc; Use 6 each Every 30 (Thirty) Days.  Dispense: 6 each; Refill: 2  -     cyanocobalamin 1000 MCG/ML injection; Inject 1 mL into the appropriate muscle as directed by prescriber Every 30 (Thirty) Days.  Dispense: 6 mL; Refill: 1    Will check 1 month labs.  Discussed increasing protein intake.  Will refill Zofran. Will send in b12 injections.    Encouraged patient to be sure to get plenty of " lean protein per day through small frequent meals all with a protein source.   Activity restrictions: none.   Recommended patient be sure to get at least 70 grams of protein per day by eating small, frequent meals all with high lean protein choices. Be sure to limit/cut back on daily carbohydrate intake. Discussed with the patient the recommended amount of water per day to intake. Reviewed vitamin requirements. Be sure to do routine exercise consistently throughout the week, including both cardio and strength training.     Instructions / Recommendations: dietary counseling recommended, recommended a daily protein intake of  grams, vitamin supplement(s) recommended, recommended exercising consistently throughout the week, behavior modifications recommended and instructed to call the office for concerns, questions, or problems.     The patient was instructed to follow up in 2 months .     The patient was counseled regarding. Total time spent during this encounter today was 20 minutes.

## 2025-01-28 LAB — METHYLMALONATE SERPL-SCNC: 136 NMOL/L (ref 0–378)

## 2025-01-29 LAB — VIT A SERPL-MCNC: 36.3 UG/DL (ref 20.1–62)

## 2025-01-30 LAB — VIT B1 BLD-SCNC: 237.1 NMOL/L (ref 66.5–200)

## 2025-02-20 RX ORDER — ONDANSETRON 4 MG/1
TABLET, ORALLY DISINTEGRATING ORAL
Qty: 10 TABLET | Refills: 0 | Status: SHIPPED | OUTPATIENT
Start: 2025-02-20

## 2025-02-20 NOTE — TELEPHONE ENCOUNTER
I talked to patient and she said she had the flu recently and used what she had and she likes to keep some on hand for occasional nausea, so yes, she did request this.

## 2025-03-15 DIAGNOSIS — K21.9 GASTROESOPHAGEAL REFLUX DISEASE WITHOUT ESOPHAGITIS: ICD-10-CM

## 2025-03-20 RX ORDER — PANTOPRAZOLE SODIUM 20 MG/1
20 TABLET, DELAYED RELEASE ORAL 2 TIMES DAILY
Qty: 60 TABLET | Refills: 1 | Status: SHIPPED | OUTPATIENT
Start: 2025-03-20

## 2025-04-14 ENCOUNTER — OFFICE VISIT (OUTPATIENT)
Dept: BARIATRICS/WEIGHT MGMT | Facility: CLINIC | Age: 45
End: 2025-04-14
Payer: COMMERCIAL

## 2025-04-14 VITALS
HEIGHT: 66 IN | HEART RATE: 60 BPM | WEIGHT: 216 LBS | BODY MASS INDEX: 34.72 KG/M2 | TEMPERATURE: 98.2 F | DIASTOLIC BLOOD PRESSURE: 78 MMHG | SYSTOLIC BLOOD PRESSURE: 131 MMHG

## 2025-04-14 DIAGNOSIS — Z98.84 S/P GASTRIC BYPASS: ICD-10-CM

## 2025-04-14 DIAGNOSIS — K59.00 CONSTIPATION, UNSPECIFIED CONSTIPATION TYPE: ICD-10-CM

## 2025-04-14 DIAGNOSIS — B37.2 SKIN YEAST INFECTION: ICD-10-CM

## 2025-04-14 DIAGNOSIS — E66.812 OBESITY, CLASS II, BMI 35-39.9: Primary | ICD-10-CM

## 2025-04-14 PROCEDURE — 99213 OFFICE O/P EST LOW 20 MIN: CPT | Performed by: PHYSICIAN ASSISTANT

## 2025-04-14 RX ORDER — NYSTATIN 100000 [USP'U]/G
POWDER TOPICAL 4 TIMES DAILY
Qty: 15 G | Refills: 2 | Status: SHIPPED | OUTPATIENT
Start: 2025-04-14 | End: 2025-05-14

## 2025-04-14 RX ORDER — FLUCONAZOLE 150 MG/1
150 TABLET ORAL ONCE
Qty: 1 TABLET | Refills: 1 | Status: SHIPPED | OUTPATIENT
Start: 2025-04-14 | End: 2025-04-14

## 2025-04-14 NOTE — PROGRESS NOTES
[facility] BARIATRIC SURGERY  [address]  [facility] [id number] [phone]  [address]  [facility] [id number] [phone]  Dept: [phone]  4/14/2025      Lizette Connolly.  [id number]  [id number]  1980  female      Chief Complaint   Patient presents with    Follow-up     4 mo fup RNY        Post-Op Bariatric Surgery:   Lizette Connolly is status post Laparoscopic RNY Bypass Revision from Sleeve with PEHR procedure, performed on 12/11/2024     HPI:       Today's weight is 98 kg (216 lb) pounds, today's BMI is Body mass index is 35.26 kg/m²., she has a loss of 14 pounds since the last visit and her  weight loss since surgery is 43 pounds. The patient reports a decreased portion size and loss of appetite.    Lizette Connolly denies n/v/d/regurg/reflux and reports constipation with bm every other day, occ hard stool and strains. Pt reports ongoing yeast infection in lower abdominal skin fold.  Recently did a 5 mile hike and felt good.     Diet and Exercise: Diet history reviewed and discussed with the patient. Weight loss/gains to date discussed with the patient. The patient states they are eating 50-60 grams of protein per day. She reports eating 4 meals per day, a typical portion size of 1 cup, eating 3 snacks per day, drinking 5+ or more 8-oz. glasses of water per day, no carbonated beverage consumption and exercising regularly- 2-3d/wk- hiking and weights.     Supplements: bariatric multi, b12 inj.     Review of Systems   Constitutional:  Positive for appetite change. Negative for fatigue and unexpected weight change.   HENT: Negative.     Eyes: Negative.    Respiratory: Negative.     Cardiovascular: Negative.  Negative for leg swelling.   Gastrointestinal:  Positive for constipation. Negative for abdominal distention, abdominal pain, diarrhea, nausea and vomiting.   Genitourinary:  Negative for difficulty urinating, frequency  and urgency.   Musculoskeletal:  Negative for back pain.   Skin:  Positive for rash.   Psychiatric/Behavioral: Negative.     All other systems reviewed and are negative.      Patient Active Problem List   Diagnosis    Chronic fatigue    GERD (gastroesophageal reflux disease)    Anxiety and depression    Essential hypertension    Vitamin D deficiency    PCOS (polycystic ovarian syndrome)    Hypertriglyceridemia    Migraine    Dietary counseling    Snoring    S/P gastric bypass    Obesity, Class II, BMI 35-39.9    Nausea    Dysphagia    Abnormal CT scan, colon    Abnormal head CT    Vasospasm of cerebral artery    Paraesophageal hernia    Constipation    Skin yeast infection       Past Medical History:   Diagnosis Date    Anxiety and depression     Chronic nausea     S/P GASTRIC SLEEVE    GERD (gastroesophageal reflux disease)     Hip pain 01/07/2019    RIGHT    HPV in female     Hypertension     Kidney stones 01/07/2019    Lactose intolerance May 2020    After gadtric sleeve    Migraine     PCOS (polycystic ovarian syndrome)     Sleep apnea     GONE    Vasospasm        The following portions of the patient's history were reviewed and updated as appropriate: allergies, current medications, past medical history, past surgical history, and problem list.    Vitals:    04/14/25 1406   BP: 131/78   Pulse: 60   Temp: 98.2 °F (36.8 °C)       Physical Exam  Vitals reviewed.   Constitutional:       Appearance: Normal appearance.   HENT:      Head: Normocephalic and atraumatic.   Eyes:      Pupils: Pupils are equal, round, and reactive to light.   Cardiovascular:      Rate and Rhythm: Normal rate.   Pulmonary:      Effort: Pulmonary effort is normal. No respiratory distress.   Musculoskeletal:         General: Normal range of motion.      Cervical back: Normal range of motion and neck supple.   Neurological:      General: No focal deficit present.      Mental Status: She is alert and oriented to person, place, and time.    Psychiatric:         Mood and Affect: Mood normal.         Behavior: Behavior normal.         Assessment:   Post-op, the patient is doing well.     Encounter Diagnoses   Name Primary?    Obesity, Class II, BMI 35-39.9 Yes    S/P gastric bypass     Constipation, unspecified constipation type     Skin yeast infection        Plan:   Diagnoses and all orders for this visit:    1. Obesity, Class II, BMI 35-39.9 (Primary)    2. S/P gastric bypass    3. Constipation, unspecified constipation type    4. Skin yeast infection    Other orders  -     fluconazole (Diflucan) 150 MG tablet; Take 1 tablet by mouth 1 (One) Time for 1 dose.  Dispense: 1 tablet; Refill: 1  -     nystatin (MYCOSTATIN) 614566 UNIT/GM powder; Apply  topically to the appropriate area as directed 4 (Four) Times a Day for 30 days.  Dispense: 15 g; Refill: 2    Will send in Diflucan for yeast infection with 1 refill.  Will send in Nystatin for skin irritation.  Continue working to increase protein to 70g/day.  Continue with exercise regimen.  Next goal wt under 200lbs.    Encouraged patient to be sure to get plenty of lean protein per day through small frequent meals all with a protein source.   Activity restrictions: none.   Recommended patient be sure to get at least 70 grams of protein per day by eating small, frequent meals all with high lean protein choices. Be sure to limit/cut back on daily carbohydrate intake. Discussed with the patient the recommended amount of water per day to intake. Reviewed vitamin requirements. Be sure to do routine exercise consistently throughout the week, including both cardio and strength training.     Instructions / Recommendations: dietary counseling recommended, recommended a daily protein intake of  grams, vitamin supplement(s) recommended, recommended exercising consistently throughout the week, behavior modifications recommended and instructed to call the office for concerns, questions, or problems.     The  patient was instructed to follow up in 3 months .     The patient was counseled regarding. Total time spent during this encounter today was 20 minutes.

## 2025-05-02 ENCOUNTER — TELEPHONE (OUTPATIENT)
Dept: BARIATRICS/WEIGHT MGMT | Facility: CLINIC | Age: 45
End: 2025-05-02
Payer: COMMERCIAL

## 2025-05-02 RX ORDER — ONDANSETRON 8 MG/1
TABLET, FILM COATED ORAL
Qty: 30 TABLET | Refills: 0 | OUTPATIENT
Start: 2025-05-02

## 2025-05-02 RX ORDER — ONDANSETRON 4 MG/1
4 TABLET, ORALLY DISINTEGRATING ORAL EVERY 4 HOURS PRN
Qty: 30 TABLET | Refills: 0 | Status: SHIPPED | OUTPATIENT
Start: 2025-05-02

## 2025-05-02 RX ORDER — ONDANSETRON 4 MG/1
TABLET, ORALLY DISINTEGRATING ORAL
Qty: 10 TABLET | Refills: 0 | OUTPATIENT
Start: 2025-05-02

## 2025-06-12 RX ORDER — ONDANSETRON 4 MG/1
TABLET, ORALLY DISINTEGRATING ORAL
Qty: 20 TABLET | Refills: 0 | Status: SHIPPED | OUTPATIENT
Start: 2025-06-12

## 2025-07-07 RX ORDER — FLUCONAZOLE 150 MG/1
TABLET ORAL
Qty: 1 TABLET | Refills: 1 | OUTPATIENT
Start: 2025-07-07

## 2025-07-14 ENCOUNTER — LAB (OUTPATIENT)
Dept: LAB | Facility: HOSPITAL | Age: 45
End: 2025-07-14
Payer: COMMERCIAL

## 2025-07-14 ENCOUNTER — OFFICE VISIT (OUTPATIENT)
Dept: BARIATRICS/WEIGHT MGMT | Facility: CLINIC | Age: 45
End: 2025-07-14
Payer: COMMERCIAL

## 2025-07-14 VITALS
SYSTOLIC BLOOD PRESSURE: 112 MMHG | HEART RATE: 67 BPM | HEIGHT: 66 IN | BODY MASS INDEX: 32.62 KG/M2 | WEIGHT: 203 LBS | DIASTOLIC BLOOD PRESSURE: 60 MMHG | TEMPERATURE: 97.5 F

## 2025-07-14 DIAGNOSIS — R11.0 NAUSEA: ICD-10-CM

## 2025-07-14 DIAGNOSIS — Z98.84 S/P GASTRIC BYPASS: ICD-10-CM

## 2025-07-14 DIAGNOSIS — E66.811 OBESITY, CLASS I, BMI 30-34.9: ICD-10-CM

## 2025-07-14 DIAGNOSIS — E66.811 OBESITY, CLASS I, BMI 30-34.9: Primary | ICD-10-CM

## 2025-07-14 PROBLEM — E66.812 OBESITY, CLASS II, BMI 35-39.9: Status: RESOLVED | Noted: 2020-11-06 | Resolved: 2025-07-14

## 2025-07-14 LAB
25(OH)D3 SERPL-MCNC: 52.2 NG/ML (ref 30–100)
ALBUMIN SERPL-MCNC: 3.3 G/DL (ref 3.5–5.2)
ALBUMIN/GLOB SERPL: 1.1 G/DL
ALP SERPL-CCNC: 114 U/L (ref 39–117)
ALT SERPL W P-5'-P-CCNC: 12 U/L (ref 1–33)
ANION GAP SERPL CALCULATED.3IONS-SCNC: 8.3 MMOL/L (ref 5–15)
AST SERPL-CCNC: 20 U/L (ref 1–32)
BASOPHILS # BLD AUTO: 0.03 10*3/MM3 (ref 0–0.2)
BASOPHILS NFR BLD AUTO: 0.5 % (ref 0–1.5)
BILIRUB SERPL-MCNC: 0.2 MG/DL (ref 0–1.2)
BUN SERPL-MCNC: 16 MG/DL (ref 6–20)
BUN/CREAT SERPL: 20 (ref 7–25)
CALCIUM SPEC-SCNC: 8.9 MG/DL (ref 8.6–10.5)
CHLORIDE SERPL-SCNC: 105 MMOL/L (ref 98–107)
CO2 SERPL-SCNC: 26.7 MMOL/L (ref 22–29)
CREAT SERPL-MCNC: 0.8 MG/DL (ref 0.57–1)
DEPRECATED RDW RBC AUTO: 36.5 FL (ref 37–54)
EGFRCR SERPLBLD CKD-EPI 2021: 93.9 ML/MIN/1.73
EOSINOPHIL # BLD AUTO: 0.15 10*3/MM3 (ref 0–0.4)
EOSINOPHIL NFR BLD AUTO: 2.7 % (ref 0.3–6.2)
ERYTHROCYTE [DISTWIDTH] IN BLOOD BY AUTOMATED COUNT: 10.8 % (ref 12.3–15.4)
FERRITIN SERPL-MCNC: 37.2 NG/ML (ref 13–150)
FOLATE SERPL-MCNC: 14.1 NG/ML (ref 4.78–24.2)
GLOBULIN UR ELPH-MCNC: 3.1 GM/DL
GLUCOSE SERPL-MCNC: 79 MG/DL (ref 65–99)
HCT VFR BLD AUTO: 40.8 % (ref 34–46.6)
HGB BLD-MCNC: 13.1 G/DL (ref 12–15.9)
IMM GRANULOCYTES # BLD AUTO: 0.01 10*3/MM3 (ref 0–0.05)
IMM GRANULOCYTES NFR BLD AUTO: 0.2 % (ref 0–0.5)
IRON 24H UR-MRATE: 83 MCG/DL (ref 37–145)
LYMPHOCYTES # BLD AUTO: 1.8 10*3/MM3 (ref 0.7–3.1)
LYMPHOCYTES NFR BLD AUTO: 31.9 % (ref 19.6–45.3)
MAGNESIUM SERPL-MCNC: 2 MG/DL (ref 1.6–2.6)
MCH RBC QN AUTO: 30.1 PG (ref 26.6–33)
MCHC RBC AUTO-ENTMCNC: 32.1 G/DL (ref 31.5–35.7)
MCV RBC AUTO: 93.8 FL (ref 79–97)
MONOCYTES # BLD AUTO: 0.41 10*3/MM3 (ref 0.1–0.9)
MONOCYTES NFR BLD AUTO: 7.3 % (ref 5–12)
NEUTROPHILS NFR BLD AUTO: 3.25 10*3/MM3 (ref 1.7–7)
NEUTROPHILS NFR BLD AUTO: 57.4 % (ref 42.7–76)
NRBC BLD AUTO-RTO: 0 /100 WBC (ref 0–0.2)
PHOSPHATE SERPL-MCNC: 3.3 MG/DL (ref 2.5–4.5)
PLATELET # BLD AUTO: 233 10*3/MM3 (ref 140–450)
PMV BLD AUTO: 11.4 FL (ref 6–12)
POTASSIUM SERPL-SCNC: 4.4 MMOL/L (ref 3.5–5.2)
PREALB SERPL-MCNC: 22.4 MG/DL (ref 20–40)
PROT SERPL-MCNC: 6.4 G/DL (ref 6–8.5)
RBC # BLD AUTO: 4.35 10*6/MM3 (ref 3.77–5.28)
SODIUM SERPL-SCNC: 140 MMOL/L (ref 136–145)
WBC NRBC COR # BLD AUTO: 5.65 10*3/MM3 (ref 3.4–10.8)

## 2025-07-14 PROCEDURE — 82728 ASSAY OF FERRITIN: CPT

## 2025-07-14 PROCEDURE — 85025 COMPLETE CBC W/AUTO DIFF WBC: CPT

## 2025-07-14 PROCEDURE — 84425 ASSAY OF VITAMIN B-1: CPT

## 2025-07-14 PROCEDURE — 84134 ASSAY OF PREALBUMIN: CPT

## 2025-07-14 PROCEDURE — 84590 ASSAY OF VITAMIN A: CPT

## 2025-07-14 PROCEDURE — 82306 VITAMIN D 25 HYDROXY: CPT

## 2025-07-14 PROCEDURE — 80053 COMPREHEN METABOLIC PANEL: CPT

## 2025-07-14 PROCEDURE — 36415 COLL VENOUS BLD VENIPUNCTURE: CPT

## 2025-07-14 PROCEDURE — 99213 OFFICE O/P EST LOW 20 MIN: CPT | Performed by: PHYSICIAN ASSISTANT

## 2025-07-14 PROCEDURE — 83540 ASSAY OF IRON: CPT

## 2025-07-14 PROCEDURE — 84100 ASSAY OF PHOSPHORUS: CPT

## 2025-07-14 PROCEDURE — 83921 ORGANIC ACID SINGLE QUANT: CPT

## 2025-07-14 PROCEDURE — 83735 ASSAY OF MAGNESIUM: CPT

## 2025-07-14 PROCEDURE — 82746 ASSAY OF FOLIC ACID SERUM: CPT

## 2025-07-14 RX ORDER — TAMSULOSIN HYDROCHLORIDE 0.4 MG/1
0.4 CAPSULE ORAL DAILY
COMMUNITY
Start: 2025-05-30

## 2025-07-14 RX ORDER — PSEUDOEPHEDRINE HCL 30 MG
100 TABLET ORAL
COMMUNITY
Start: 2025-06-12

## 2025-07-14 NOTE — PROGRESS NOTES
[facility]ATR[facility] BARIATRIC SURGERY  [address]  [facility] [id number] [phone]  [address]  [facility] [id number] [phone]  Dept: [phone]  7/14/2025      Lizette Connolly.  [id number]  [id number]  1980  female      Chief Complaint   Patient presents with    Follow-up     7 mo f/u rny       BH Post-Op Bariatric Surgery:   Lizette Connolly is status post Laparoscopic RNY Bypass Revision from Sleeve with PEHR procedure, performed on 12/11/2024     HPI:       Today's weight is 92.1 kg (203 lb) pounds, today's BMI is Body mass index is 33.14 kg/m²., she has a loss of 13 pounds since the last visit and her  weight loss since surgery is 56 pounds. The patient reports a decreased portion size and loss of appetite.    Lizette Connolly denies v/d/regurg/reflux and reports nausea, constipation, hair loss. Pt reports more so nausea wit kidney stones and migraines. Reports 1 episode of RUQ pain- thinks started in her back and moved forward. Was after eating stir holcomb for lunch and popcorn for snack. Had a bm that night. Ate stir holcomb the next day without issue. Tried Linzess for constipation but reports it was too much. Takes stool softener daily.     Diet and Exercise: Diet history reviewed and discussed with the patient. Weight loss/gains to date discussed with the patient. The patient states they are eating 60-65 grams of protein per day. She reports eating 4 meals per day, a typical portion size of 1 cup, eating 2 snacks per day, drinking 5 or more 8-oz. glasses of water per day, no carbonated beverage consumption and exercising regularly- 2-3d/wk swimming and hiking.     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    Supplements: bariatric multi, b12.     Review of Systems   Constitutional:  Positive for appetite change. Negative for fatigue and unexpected weight change.        Hair loss   HENT: Negative.     Eyes:  Negative.    Respiratory: Negative.     Cardiovascular: Negative.  Negative for leg swelling.   Gastrointestinal:  Positive for constipation and nausea. Negative for abdominal distention, abdominal pain, diarrhea and vomiting.   Genitourinary:  Negative for difficulty urinating, frequency and urgency.   Musculoskeletal:  Negative for back pain.   Skin: Negative.    Psychiatric/Behavioral: Negative.     All other systems reviewed and are negative.      Patient Active Problem List   Diagnosis    Chronic fatigue    GERD (gastroesophageal reflux disease)    Anxiety and depression    Essential hypertension    Vitamin D deficiency    PCOS (polycystic ovarian syndrome)    Hypertriglyceridemia    Migraine    Dietary counseling    Snoring    S/P gastric bypass    Nausea    Dysphagia    Abnormal CT scan, colon    Abnormal head CT    Vasospasm of cerebral artery    Paraesophageal hernia    Constipation    Skin yeast infection    Obesity, Class I, BMI 30-34.9       Past Medical History:   Diagnosis Date    Anxiety and depression     Chronic nausea     S/P GASTRIC SLEEVE    GERD (gastroesophageal reflux disease)     Hip pain 01/07/2019    RIGHT    HPV in female     Hypertension     Kidney stones 01/07/2019    Lactose intolerance May 2020    After gadtric sleeve    Migraine     PCOS (polycystic ovarian syndrome)     Sleep apnea     GONE    Vasospasm        The following portions of the patient's history were reviewed and updated as appropriate: allergies, current medications, past medical history, past surgical history, and problem list.    Vitals:    07/14/25 1408   BP: 112/60   Pulse: 67   Temp: 97.5 °F (36.4 °C)       Physical Exam  Vitals reviewed.   Constitutional:       Appearance: Normal appearance.   HENT:      Head: Normocephalic and atraumatic.   Eyes:      Pupils: Pupils are equal, round, and reactive to light.   Cardiovascular:      Rate and Rhythm: Normal rate.   Pulmonary:      Effort: Pulmonary effort is normal. No  respiratory distress.   Musculoskeletal:         General: Normal range of motion.      Cervical back: Normal range of motion and neck supple.   Neurological:      General: No focal deficit present.      Mental Status: She is alert and oriented to person, place, and time.   Psychiatric:         Mood and Affect: Mood normal.         Behavior: Behavior normal.         Assessment:   Post-op, the patient is doing well.     Encounter Diagnoses   Name Primary?    Obesity, Class I, BMI 30-34.9 Yes    S/P gastric bypass     Nausea        Plan:   Diagnoses and all orders for this visit:    1. Obesity, Class I, BMI 30-34.9 (Primary)  -     CBC & Differential; Future  -     Comprehensive Metabolic Panel; Future  -     Ferritin; Future  -     Folate; Future  -     Iron; Future  -     Magnesium; Future  -     Methylmalonic Acid, Serum; Future  -     Phosphorus; Future  -     Prealbumin; Future  -     Vitamin A; Future  -     Vitamin B1, Whole Blood; Future  -     Vitamin D,25-Hydroxy; Future    2. S/P gastric bypass  -     CBC & Differential; Future  -     Comprehensive Metabolic Panel; Future  -     Ferritin; Future  -     Folate; Future  -     Iron; Future  -     Magnesium; Future  -     Methylmalonic Acid, Serum; Future  -     Phosphorus; Future  -     Prealbumin; Future  -     Vitamin A; Future  -     Vitamin B1, Whole Blood; Future  -     Vitamin D,25-Hydroxy; Future    3. Nausea    Add in daily fiber to help with bms.  Can also add in miralax as needed.  Will check 6 month labs.  Continue current diet and exercise.  If abdominal pain occurs again call the office.    Encouraged patient to be sure to get plenty of lean protein per day through small frequent meals all with a protein source.   Activity restrictions: none.   Recommended patient be sure to get at least 70 grams of protein per day by eating small, frequent meals all with high lean protein choices. Be sure to limit/cut back on daily carbohydrate intake. Discussed  with the patient the recommended amount of water per day to intake. Reviewed vitamin requirements. Be sure to do routine exercise consistently throughout the week, including both cardio and strength training.     Instructions / Recommendations: dietary counseling recommended, recommended a daily protein intake of  grams, vitamin supplement(s) recommended, recommended exercising consistently throughout the week, behavior modifications recommended and instructed to call the office for concerns, questions, or problems.     The patient was instructed to follow up in 3 months .     The patient was counseled regarding the above procedure. Total time spent during this encounter today was 20 minutes including reviewing previous notes, lab results and face to face time spent with the patient.  The majority of time was spent counseling the patient regarding diet and exercise as well as reviewing their medications and their compliance with the procedure.

## 2025-07-16 LAB — VIT B1 BLD-SCNC: 178.4 NMOL/L (ref 66.5–200)

## 2025-07-18 LAB — METHYLMALONATE SERPL-SCNC: 151 NMOL/L (ref 0–378)

## 2025-07-22 LAB — VIT A SERPL-MCNC: 47 UG/DL (ref 20.1–62)

## 2025-08-08 RX ORDER — ONDANSETRON 4 MG/1
TABLET, ORALLY DISINTEGRATING ORAL
Qty: 20 TABLET | Refills: 1 | Status: SHIPPED | OUTPATIENT
Start: 2025-08-08

## (undated) DEVICE — ESOPHAGEAL BALLOON DILATATION CATHETER: Brand: CRE FIXED WIRE

## (undated) DEVICE — CLMP STD 22CM DISP

## (undated) DEVICE — GLV SURG SENSICARE PI LF PF 7.5 GRN STRL

## (undated) DEVICE — MSK PROC CURAPLEX O2 2/ADAPT 7FT

## (undated) DEVICE — ADAPT CLN BIOGUARD AIR/H2O DISP

## (undated) DEVICE — BITEBLOCK OMNI BLOC

## (undated) DEVICE — LN SMPL CO2 SHTRM SD STREAM W/M LUER

## (undated) DEVICE — KT ORCA ORCAPOD DISP STRL

## (undated) DEVICE — GLV SURG SENSICARE PI MIC PF SZ8.5 LF STRL

## (undated) DEVICE — GOWN,NON-REINFORCED,SIRUS,SET IN SLV,XL: Brand: MEDLINE

## (undated) DEVICE — BLCK/BITE BLOX WO/DENTL/RIM W/STRAP 54F

## (undated) DEVICE — SEAL

## (undated) DEVICE — STAPLER 60: Brand: SUREFORM

## (undated) DEVICE — TROC BLADLES AIRSEAL/OPTI THRD 8X120MM 1P/U

## (undated) DEVICE — 500ML,PRESSURE INFUSER W/STOPCOCK: Brand: MEDLINE

## (undated) DEVICE — PK OSC LAP SLV 40

## (undated) DEVICE — SENSR O2 OXIMAX FNGR A/ 18IN NONSTR

## (undated) DEVICE — SEALANT WND FIBRIN TISSEEL PREFIL/SYR/PRIMAFZ 4ML

## (undated) DEVICE — GLV SURG SENSICARE PI PF LF 8.5 GRN STRL

## (undated) DEVICE — FRCP BX RADJAW4 NDL 2.8 240CM LG OG BX40

## (undated) DEVICE — BLADELESS OBTURATOR: Brand: WECK VISTA

## (undated) DEVICE — SYRINGE,TOOMEY,IRRIGATION,70CC,STERILE: Brand: MEDLINE

## (undated) DEVICE — MARKR SKIN W/RULR AND LBL

## (undated) DEVICE — DEV INFL ALLIANCE2 SYS

## (undated) DEVICE — CANN O2 ETCO2 FITS ALL CONN CO2 SMPL A/ 7IN DISP LF

## (undated) DEVICE — LAPAROSCOPIC SMOKE FILTRATION SYSTEM: Brand: PALL LAPAROSHIELD® PLUS LAPAROSCOPIC SMOKE FILTRATION SYSTEM

## (undated) DEVICE — VESSEL SEALER EXTEND: Brand: ENDOWRIST

## (undated) DEVICE — GLV SURG SENSICARE PI MIC PF SZ6 LF STRL

## (undated) DEVICE — SUT SILK 2/0 SH 30IN K833H

## (undated) DEVICE — SUT VIC 2/0 SH 27IN

## (undated) DEVICE — VISIGI 3D®  CALIBRATION SYSTEM  SIZE 36FR BYPASS/SHORT: Brand: BOEHRINGER® VISIGI 3D®  CALIBRATION SYSTEM  SIZE 36FR BYPASS/SHORT

## (undated) DEVICE — LAPAROVUE VISIBILITY SYSTEM LAPAROSCOPIC SOLUTIONS: Brand: LAPAROVUE

## (undated) DEVICE — CONN TBG Y 5 IN 1 LF STRL

## (undated) DEVICE — APL DUPLOSPRAYER MIS 40CM

## (undated) DEVICE — DEV INFL CRE STERIFLATE 60CC DISP

## (undated) DEVICE — SINGLE-USE BIOPSY FORCEPS: Brand: RADIAL JAW 4

## (undated) DEVICE — TUBING, SUCTION, 1/4" X 10', STRAIGHT: Brand: MEDLINE

## (undated) DEVICE — ECHELON FLEX POWERED PLUS LONG ARTICULATING ENDOSCOPIC LINEAR CUTTER, 60MM: Brand: ECHELON FLEX

## (undated) DEVICE — DUAL LUMEN STOMACH TUBE,ANTI-REFLUX VALVE: Brand: SALEM SUMP

## (undated) DEVICE — SUT MNCRYL PLS ANTIB UD 4/0 PS2 18IN

## (undated) DEVICE — ARM DRAPE

## (undated) DEVICE — OSC ROBOT BARIATRIC: Brand: MEDLINE INDUSTRIES, INC.

## (undated) DEVICE — NDL HYPO PRECISIONGLIDE REG 21G 1 1/2

## (undated) DEVICE — ST TBG AIRSEAL BIF FLTR W/ACT/CHARCOAL/FLTR

## (undated) DEVICE — VIOLET BRAIDED (POLYGLACTIN 910), SYNTHETIC ABSORBABLE SUTURE: Brand: COATED VICRYL

## (undated) DEVICE — GLV SURG SENSICARE PI MIC PF SZ7.5 LF STRL

## (undated) DEVICE — SUT VIC 0 CT1 27IN DYED J340H

## (undated) DEVICE — BLCK/BITE BLOX W/DENTL/RIM W/STRAP 54F

## (undated) DEVICE — DECANTER BAG 9": Brand: MEDLINE INDUSTRIES, INC.

## (undated) DEVICE — PATIENT RETURN ELECTRODE, SINGLE-USE, CONTACT QUALITY MONITORING, ADULT, WITH 9FT CORD, FOR PATIENTS WEIGING OVER 33LBS. (15KG): Brand: MEGADYNE

## (undated) DEVICE — GLV SURG SENSICARE POLYISPRN W/ALOE PF LF 6.5 GRN STRL

## (undated) DEVICE — ENDOPATH XCEL BLADELESS TROCARS WITH STABILITY SLEEVES: Brand: ENDOPATH XCEL

## (undated) DEVICE — ENSEAL LAPAROSCOPIC TISSUE SEALER G2 ARTICULATING CURVED JAW FOR USE WITH G2 GENERATOR 5MM DIAMETER 45CM SHAFT LENGTH: Brand: ENSEAL